# Patient Record
Sex: MALE | Race: WHITE | Employment: OTHER | URBAN - METROPOLITAN AREA
[De-identification: names, ages, dates, MRNs, and addresses within clinical notes are randomized per-mention and may not be internally consistent; named-entity substitution may affect disease eponyms.]

---

## 2020-07-22 VITALS
SYSTOLIC BLOOD PRESSURE: 126 MMHG | OXYGEN SATURATION: 98 % | BODY MASS INDEX: 31.82 KG/M2 | WEIGHT: 198 LBS | DIASTOLIC BLOOD PRESSURE: 70 MMHG | HEIGHT: 66 IN | HEART RATE: 51 BPM

## 2020-07-22 PROBLEM — H91.90 HEARING LOSS: Status: ACTIVE | Noted: 2020-07-22

## 2020-07-22 PROBLEM — H93.90 EAR PROBLEMS: Status: ACTIVE | Noted: 2020-07-22

## 2020-07-28 ENCOUNTER — OFFICE VISIT (OUTPATIENT)
Dept: ENT CLINIC | Age: 85
End: 2020-07-28
Payer: MEDICARE

## 2020-07-28 VITALS
BODY MASS INDEX: 35.88 KG/M2 | HEIGHT: 62 IN | DIASTOLIC BLOOD PRESSURE: 80 MMHG | SYSTOLIC BLOOD PRESSURE: 122 MMHG | HEART RATE: 74 BPM | TEMPERATURE: 97.9 F | RESPIRATION RATE: 18 BRPM | OXYGEN SATURATION: 97 % | WEIGHT: 195 LBS

## 2020-07-28 DIAGNOSIS — H60.8X1 CHRONIC ECZEMATOUS OTITIS EXTERNA OF RIGHT EAR: ICD-10-CM

## 2020-07-28 DIAGNOSIS — H90.3 SENSORINEURAL HEARING LOSS (SNHL) OF BOTH EARS: Primary | ICD-10-CM

## 2020-07-28 DIAGNOSIS — I69.993 CVA, OLD, ATAXIA: ICD-10-CM

## 2020-07-28 PROCEDURE — 99203 OFFICE O/P NEW LOW 30 MIN: CPT | Performed by: OTOLARYNGOLOGY

## 2020-07-28 RX ORDER — NEOMYCIN SULFATE, POLYMYXIN B SULFATE AND HYDROCORTISONE 10; 3.5; 1 MG/ML; MG/ML; [USP'U]/ML
3 SUSPENSION/ DROPS AURICULAR (OTIC) 2 TIMES DAILY
Qty: 10 ML | Refills: 1 | Status: SHIPPED | OUTPATIENT
Start: 2020-07-28 | End: 2020-08-07

## 2020-07-28 NOTE — PROGRESS NOTES
Subjective:    Scarlett Farias   80 y.o.   1935     Ear Pain  Patient complains of ear pain and possible ear infection. Symptoms include right ear drainage . Onset of symptoms was 1 minutes ago, gradually worsening since that time. He also c/o 1 minute right ear pressure/pain. He is drinking plenty of fluids. Follow-up   Pertinent negatives include no chest pain, no headaches and no shortness of breath. pt wears hearing aids, right ear bleeds sometimes, hearing aid tech states does not look right; no pain      Review of Systems  Review of Systems   Constitutional: Negative for chills and fever. HENT: Positive for ear discharge and hearing loss. Negative for ear pain, nosebleeds and tinnitus. Eyes: Negative for blurred vision and double vision. Respiratory: Negative for cough, sputum production and shortness of breath. Cardiovascular: Negative for chest pain and palpitations. Gastrointestinal: Negative for heartburn, nausea and vomiting. Musculoskeletal: Negative for joint pain and neck pain. Skin: Negative. Neurological: Positive for tremors and weakness. Negative for dizziness, speech change and headaches. Endo/Heme/Allergies: Negative for environmental allergies. Does not bruise/bleed easily. Psychiatric/Behavioral: Negative for memory loss and substance abuse. The patient does not have insomnia. All other systems reviewed and are negative. Objective:     Visit Vitals  /80 (BP 1 Location: Left arm, BP Patient Position: Sitting)   Pulse 74   Temp 97.9 °F (36.6 °C) (Oral)   Resp 18   Ht 5' 2\" (1.575 m)   Wt 195 lb (88.5 kg)   SpO2 97%   BMI 35.67 kg/m²        Physical Exam  Vitals signs reviewed. Constitutional:       Appearance: Normal appearance. He is normal weight. HENT:      Head: Normocephalic and atraumatic. Jaw: There is normal jaw occlusion. No trismus or malocclusion.       Salivary Glands: Right salivary gland is not diffusely enlarged or tender. Left salivary gland is not diffusely enlarged or tender. Right Ear: External ear normal. Decreased hearing noted. Drainage present. Tympanic membrane is scarred and erythematous. Left Ear: External ear normal. Decreased hearing noted. Tympanic membrane is scarred and erythematous. Ears:      Finley exam findings: does not lateralize. Right Rinne: AC > BC. Left Rinne: AC > BC. Comments: Hearing aids bilaterally  Dry skin AU - ? Fungal debris on left  Right is erythematous, scarred, hypervascular  Eyes:      Extraocular Movements: Extraocular movements intact. Pupils: Pupils are equal, round, and reactive to light. Neck:      Musculoskeletal: Normal range of motion. No edema or erythema. Thyroid: No thyroid mass, thyromegaly or thyroid tenderness. Trachea: Trachea and phonation normal. No tracheal tenderness. Cardiovascular:      Rate and Rhythm: Normal rate and regular rhythm. Pulmonary:      Effort: Pulmonary effort is normal.      Breath sounds: Normal breath sounds. No stridor. No wheezing. Musculoskeletal: Normal range of motion. Lymphadenopathy:      Cervical: No cervical adenopathy. Skin:     General: Skin is warm and dry. Neurological:      General: No focal deficit present. Mental Status: He is alert and oriented to person, place, and time. Mental status is at baseline. Motor: Weakness present. Psychiatric:         Mood and Affect: Mood normal.         Behavior: Behavior normal.         Assessment/Plan:     Encounter Diagnoses   Name Primary? 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 Follow-up and Dispositions    · Return in about 1 month (around 8/28/2020).

## 2020-07-28 NOTE — LETTER
7/28/20 Patient: Alpesh Vazquez YOB: 1935 Date of Visit: 7/28/2020 Payton Duncan MD 
[address] 
[address] [facility] [address],[id number] VIA Facsimile: [phone] Dear Payton Duncan MD, Thank you for referring Mr. Kortney Doty to [facility] NO[facility]ERGY [facility] for evaluation. My notes for this consultation are attached. If you have questions, please do not hesitate to call me. I look forward to following your patient along with you. Sincerely, Anayeli Tanner MD

## 2020-08-25 ENCOUNTER — OFFICE VISIT (OUTPATIENT)
Dept: ENT CLINIC | Age: 85
End: 2020-08-25
Payer: MEDICARE

## 2020-08-25 VITALS
TEMPERATURE: 97.1 F | SYSTOLIC BLOOD PRESSURE: 134 MMHG | HEART RATE: 74 BPM | DIASTOLIC BLOOD PRESSURE: 84 MMHG | RESPIRATION RATE: 18 BRPM | BODY MASS INDEX: 31.82 KG/M2 | WEIGHT: 198 LBS | OXYGEN SATURATION: 97 % | HEIGHT: 66 IN

## 2020-08-25 DIAGNOSIS — H90.6 MIXED HEARING LOSS, BILATERAL: Primary | ICD-10-CM

## 2020-08-25 DIAGNOSIS — H60.393 OTHER INFECTIVE CHRONIC OTITIS EXTERNA OF BOTH EARS: ICD-10-CM

## 2020-08-25 DIAGNOSIS — H60.399 ACUTE BACTERIAL OTITIS EXTERNA: ICD-10-CM

## 2020-08-25 DIAGNOSIS — H61.22 CERUMEN DEBRIS ON TYMPANIC MEMBRANE OF LEFT EAR: ICD-10-CM

## 2020-08-25 PROBLEM — H60.90 OTITIS EXTERNA: Status: ACTIVE | Noted: 2020-08-25

## 2020-08-25 PROCEDURE — G8427 DOCREV CUR MEDS BY ELIG CLIN: HCPCS | Performed by: OTOLARYNGOLOGY

## 2020-08-25 PROCEDURE — 1101F PT FALLS ASSESS-DOCD LE1/YR: CPT | Performed by: OTOLARYNGOLOGY

## 2020-08-25 PROCEDURE — 69210 REMOVE IMPACTED EAR WAX UNI: CPT | Performed by: OTOLARYNGOLOGY

## 2020-08-25 PROCEDURE — 99213 OFFICE O/P EST LOW 20 MIN: CPT | Performed by: OTOLARYNGOLOGY

## 2020-08-25 PROCEDURE — G8536 NO DOC ELDER MAL SCRN: HCPCS | Performed by: OTOLARYNGOLOGY

## 2020-08-25 PROCEDURE — G0444 DEPRESSION SCREEN ANNUAL: HCPCS | Performed by: OTOLARYNGOLOGY

## 2020-08-25 PROCEDURE — G8510 SCR DEP NEG, NO PLAN REQD: HCPCS | Performed by: OTOLARYNGOLOGY

## 2020-08-25 PROCEDURE — G8419 CALC BMI OUT NRM PARAM NOF/U: HCPCS | Performed by: OTOLARYNGOLOGY

## 2020-08-25 RX ORDER — NEOMYCIN SULFATE, POLYMYXIN B SULFATE AND HYDROCORTISONE 10; 3.5; 1 MG/ML; MG/ML; [USP'U]/ML
4 SUSPENSION/ DROPS AURICULAR (OTIC) 3 TIMES DAILY
Qty: 10 ML | Refills: 1 | Status: SHIPPED | OUTPATIENT
Start: 2020-08-25 | End: 2020-09-08

## 2020-08-25 NOTE — PROGRESS NOTES
Subjective:    [place]   80 y.o.   1935     Ear Pain  Patient complains of ear pain and possible ear infection. Symptoms include right ear drainage . Onset of symptoms was 1 minutes ago, gradually worsening since that time. He also c/o 1 minute right ear pressure/pain. He is drinking plenty of fluids. Follow-up   Pertinent negatives include no chest pain, no headaches and no shortness of breath. pt wears hearing aids, right ear bleeds sometimes, hearing aid tech states does not look right; no pain    8/25/20 - 1 mo f/u pt states ear does feel better, used the whole bottle right ear; hearing aid still not helping much      Review of Systems  Review of Systems   Constitutional: Negative for chills and fever. HENT: Positive for ear discharge and hearing loss. Negative for ear pain, nosebleeds and tinnitus. Eyes: Negative for blurred vision and double vision. Respiratory: Negative for cough, sputum production and shortness of breath. Cardiovascular: Negative for chest pain and palpitations. Gastrointestinal: Negative for heartburn, nausea and vomiting. Musculoskeletal: Negative for joint pain and neck pain. Skin: Negative. Neurological: Positive for tremors and weakness. Negative for dizziness, speech change and headaches. Endo/Heme/Allergies: Negative for environmental allergies. Does not bruise/bleed easily. Psychiatric/Behavioral: Negative for memory loss and substance abuse. The patient does not have insomnia. All other systems reviewed and are negative. Objective:     Visit Vitals  /84 (BP 1 Location: Left arm, BP Patient Position: Sitting)   Pulse 74   Temp 97.1 °F (36.2 °C) (Oral)   Resp 18   Ht 5' 6\" (1.676 m)   Wt 198 lb (89.8 kg)   SpO2 97%   BMI 31.96 kg/m²        Physical Exam  Vitals signs reviewed. Constitutional:       Appearance: Normal appearance. He is normal weight. HENT:      Head: Normocephalic and atraumatic. Jaw:  There is normal jaw occlusion. No trismus or malocclusion. Salivary Glands: Right salivary gland is not diffusely enlarged or tender. Left salivary gland is not diffusely enlarged or tender. Right Ear: External ear normal. Decreased hearing noted. Tympanic membrane is injected, scarred and erythematous. Left Ear: External ear normal. Decreased hearing noted. Drainage present. Tympanic membrane is injected, scarred and erythematous. Ears:      Finley exam findings: does not lateralize. Right Rinne: AC > BC. Left Rinne: AC > BC. Comments: Hearing aids bilaterally  Dry skin AU - cerumen and drainage cleaned left  Right is erythematous, scarred, hypervascular- this is slightly better      Procedure - Removal Impacted Cerumen    Ears are examined under microscope/headlight.  right ears are cleaned using otologic curette, suction, and/or alligator forceps. Eyes:      Extraocular Movements: Extraocular movements intact. Pupils: Pupils are equal, round, and reactive to light. Neck:      Musculoskeletal: Normal range of motion. No edema or erythema. Thyroid: No thyroid mass, thyromegaly or thyroid tenderness. Trachea: Trachea and phonation normal. No tracheal tenderness. Cardiovascular:      Rate and Rhythm: Normal rate and regular rhythm. Pulmonary:      Effort: Pulmonary effort is normal.      Breath sounds: Normal breath sounds. No stridor. No wheezing. Musculoskeletal: Normal range of motion. Lymphadenopathy:      Cervical: No cervical adenopathy. Skin:     General: Skin is warm and dry. Neurological:      General: No focal deficit present. Mental Status: He is alert and oriented to person, place, and time. Mental status is at baseline. Motor: Weakness present. Psychiatric:         Mood and Affect: Mood normal.         Behavior: Behavior normal.         Assessment/Plan:     Encounter Diagnoses   Name Primary?    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

## 2020-09-14 ENCOUNTER — HOSPITAL ENCOUNTER (OUTPATIENT)
Dept: PHYSICAL THERAPY | Age: 85
Discharge: HOME | End: 2020-09-14
Payer: MEDICARE

## 2020-09-14 PROCEDURE — 97110 THERAPEUTIC EXERCISES: CPT

## 2020-09-14 PROCEDURE — 97116 GAIT TRAINING THERAPY: CPT

## 2020-09-14 NOTE — PROGRESS NOTES
Please refer to the documentation in the prior EMR for therapy documentation prior to 09/14/2020 including evaluation findings, treatment notes, and therapy plan/goals. Please refer to the [facility] electronic portion of the medical record for therapy documentation entered on or after 09/14/2020.

## 2020-09-14 NOTE — PROGRESS NOTES
PT DAILY TREATMENT NOTE - [facility] 2-15    Patient Name: Castillo Saavedra  Date:2020  : 1935  [x]  Patient  Verified  Payor: Marcello Snare / Plan: [place] / Product Type: Medicare /    In time: 0200pm Out time: 0300pm  Total Treatment Time (min): 60  Total Timed Codes (min): 60  1:1 Treatment Time ( [address] only): 60  Visit #: 2  Treatment Area: Other abnormalities of gait and mobility [R26.89]    SUBJECTIVE  Pain Level (0-10 scale): 0/10    Any medication changes, allergies to medications, adverse drug reactions, diagnosis change, or new procedure performed?: [x] No    [] Yes (see summary sheet for update)    Subjective functional status/changes: Patient reports that he bought a cane 2-3 days ago and has been using it. Still feels weird. Not sure if height is right or not. Has bad vision as can't read small lines. Friend comes over every night to cook dinner and assist patient as needed. Had home health PT and did some LE strengthening, but hasn't been as consistent with it.     OBJECTIVE  [x] Skin assessment post-treatment:  [x]intact []redness- no adverse reaction    []redness  adverse reaction:     50 min Therapeutic Exercise:  [x] See flow sheet :   Rationale: increase strength, improve balance and increase proprioception to improve the patients ability to walk and perform ADLs with improved safety and independence        10 min Gait Trainin feet with SPC device on level surfaces with SBA level of assist   Rationale: improve balance and safety  to improve the patients ability to walk and perform ADLs with improved safety and independence            With   [x] TE   [] TA   [] neuro   [] other: Patient Education: [x] Review HEP    [] Progressed/Changed HEP based on:   [] positioning   [] body mechanics   [] transfers   [] heat/ice application    [] other:        Pain Level (0-10 scale) post treatment: 0/10    ASSESSMENT/Changes in Function:   Patient with good response to LE strength and balance interventions with some LE fatigue following session. Pt with requiring 1-1 supervision and guidance with interventions as suspect cognitive and visual deficits contributing to functional difficulty as well as LE strength/balance. PT arrives with [facility], but not using correctly,but at correct height as instruct in step through pattern with emphasis on big steps and heel to toe pattern. Most difficulty with fwd step ups without UE support as SBA-MinPA required at times. Written HEP provided and discussed with patient and friend with patient's permission. Patient will continue to benefit from skilled PT services to modify and progress therapeutic interventions, address strength deficits, analyze and cue movement patterns and instruct in home and community integration to attain remaining goals.       Goals  - pt to have no falling instances with the least restrictive AD within 2-4 weeks  -pt to be able to get under 13.5 seconds with TUG test  -pt to be able to perform at least 12 reps with the 30 second sit <> stand test  -pt to be able to walk community and household distances on uneven ground with the least restrictive device without significant unsteadiness  - pt to be able to walk community and household distances on even ground with the least restrictive device without significant unsteadiness  - pt to be independent with HEP prior to discharge  -pt to score at least 16 on DGI to reflect safety with dynamic mobility       []  See Plan of Care  []  See progress note/recertification  []  See Discharge Summary           PLAN  []  Upgrade activities as tolerated     [x]  Continue plan of care  []  Update interventions per flow sheet       []  Discharge due to:_  []  Other:_      Johnson Gray, PT, DPT 9/14/2020

## 2020-09-16 ENCOUNTER — HOSPITAL ENCOUNTER (OUTPATIENT)
Dept: PHYSICAL THERAPY | Age: 85
Discharge: HOME | End: 2020-09-16
Payer: MEDICARE

## 2020-09-16 PROCEDURE — 97110 THERAPEUTIC EXERCISES: CPT

## 2020-09-16 PROCEDURE — 97116 GAIT TRAINING THERAPY: CPT

## 2020-09-16 NOTE — PROGRESS NOTES
PT DAILY TREATMENT NOTE - [facility] 2-15     Patient Name: Carlos Collazo  NFYK:  : 1935  [x]? Patient  Verified  Payor: [place] MEDICARE / Plan: [place] MEDICARE PART A & B / Product Type: Medicare /    In time: 0200pm Out time: 0300pm  Total Treatment Time (min): 60  Total Timed Codes (min): 60  1:1 Treatment Time ([facility] only): 60  Visit #: 3    Treatment Area: Other abnormalities of gait and mobility [R26.89]     SUBJECTIVE  Pain Level (0-10 scale): 0/10     Any medication changes, allergies to medications, adverse drug reactions, diagnosis change, or new procedure performed?: [x]? No    []? Yes (see summary sheet for update)     Subjective functional status/changes: Patient reports had some LE soreness following last session. Friend reports doing HEP at home with no issues     OBJECTIVE       50 min Therapeutic Exercise:  [x]? See flow sheet :   Rationale: increase strength, improve balance and increase proprioception to improve the patients ability to walk and perform ADLs with improved safety and independence           10 min Gait Trainin feet with SPC device on level surfaces with SBA level of assist   Rationale: improve balance and safety  to improve the patients ability to walk and perform ADLs with improved safety and independence                                                                    With   [x]? TE   []? TA   []? neuro   []? other: Patient Education: [x]? Review HEP    []? Progressed/Changed HEP based on:   []? positioning   []? body mechanics   []? transfers   []? heat/ice application    []? other:          Pain Level (0-10 scale) post treatment: 0/10     ASSESSMENT/Changes in Function:   Patient with good response to LE strength and balance interventions with some LE fatigue following session with pt able to tolerate exercises with more resistance and difficulty today. Seated rest breaks required throughout sessions.  Pt with requiring 1-1 supervision and guidance with interventions as suspect cognitive and visual deficits contributing to functional difficulty as well as LE strength/balance. PT arrives with [facility], but not using correctly,but at correct height as instruct in step through pattern with emphasis on big steps and heel to toe pattern. Most difficulty with balance from foam with eyes closed today as tends to fall backwards. Patient will continue to benefit from skilled PT services to modify and progress therapeutic interventions, address strength deficits, analyze and cue movement patterns and instruct in home and community integration to attain remaining goals.        Goals  - pt to have no falling instances with the least restrictive AD within 2-4 weeks  -pt to be able to get under 13.5 seconds with TUG test  -pt to be able to perform at least 12 reps with the 30 second sit <> stand test  -pt to be able to walk community and household distances on uneven ground with the least restrictive device without significant unsteadiness  - pt to be able to walk community and household distances on even ground with the least restrictive device without significant unsteadiness  - pt to be independent with HEP prior to discharge  -pt to score at least 16 on DGI to reflect safety with dynamic mobility        []? See Plan of Care  []? See progress note/recertification  []? See Discharge Summary            PLAN  []? Upgrade activities as tolerated     [x]? Continue plan of care  []? Update interventions per flow sheet       []? Discharge due to:_  []?   Other:_       Hernandez Began, PT, DPT         9/14/2020

## 2020-09-21 ENCOUNTER — HOSPITAL ENCOUNTER (OUTPATIENT)
Dept: PHYSICAL THERAPY | Age: 85
Discharge: HOME | End: 2020-09-21
Payer: MEDICARE

## 2020-09-21 PROCEDURE — 97110 THERAPEUTIC EXERCISES: CPT

## 2020-09-21 NOTE — PROGRESS NOTES
PT DAILY TREATMENT NOTE - [facility] 2-15    Patient Name: Carlos Collazo  Date:2020  : 1935  [x]  Patient  Verified  Payor: Annalisa Del Toro / Plan: [place] MEDICARE PART A & B / Product Type: Medicare /    In time:02:00 PM  Out time: 02:48 PM  Total Treatment Time (min):48 Total Timed Codes (min): 48  1:1 Treatment Time ( [address] only): 48  Visit #:  3    Treatment Area: Other abnormalities of gait and mobility [R26.89]    SUBJECTIVE  Pain Level (0-10 scale): 0    Any medication changes, allergies to medications, adverse drug reactions, diagnosis change, or new procedure performed?: [x] No    [] Yes (see summary sheet for update)    Subjective functional status/changes:   [x] No changes reported      OBJECTIVE      45 min Therapeutic Exercise:  [x] See flow sheet :   Rationale: increase strength, improve coordination, improve balance and increase proprioception to improve the patients ability to ambulate safely with decreased falls. With   [] TE   [] TA   [] neuro   [] other: Patient Education: [x] Review HEP    [] Progressed/Changed HEP based on:   [] positioning   [] body mechanics   [] transfers   [] heat/ice application    [] other:      Pain Level (0-10 scale) post treatment: 0    ASSESSMENT/Changes in Function:  Patient is doing well with exercises with assistance. Ambulated with straight cane, but has a tendency to walk faster than the cane. Patient is hard of hearing. Patient will continue to benefit from skilled PT services to address functional mobility deficits, address strength deficits, analyze and cue movement patterns and address imbalance/dizziness to attain remaining goals. [x]  See Plan of Care  []  See progress note/recertification  []  See Discharge Summary         Progress towards goals / Updated goals: To be able to perform HEP independently.       PLAN  []  Upgrade activities as tolerated     [x]  Continue plan of care  []  Update interventions per flow sheet       [] Discharge due to:_  []  Other:_      Aisha Ventura, PT 9/21/2020

## 2020-09-22 ENCOUNTER — OFFICE VISIT (OUTPATIENT)
Dept: ENT CLINIC | Age: 85
End: 2020-09-22
Payer: MEDICARE

## 2020-09-22 VITALS
HEART RATE: 67 BPM | TEMPERATURE: 97.1 F | BODY MASS INDEX: 31.82 KG/M2 | DIASTOLIC BLOOD PRESSURE: 78 MMHG | OXYGEN SATURATION: 97 % | RESPIRATION RATE: 18 BRPM | HEIGHT: 66 IN | SYSTOLIC BLOOD PRESSURE: 140 MMHG | WEIGHT: 198 LBS

## 2020-09-22 DIAGNOSIS — H60.393 OTHER INFECTIVE CHRONIC OTITIS EXTERNA OF BOTH EARS: ICD-10-CM

## 2020-09-22 DIAGNOSIS — H90.6 MIXED HEARING LOSS, BILATERAL: Primary | ICD-10-CM

## 2020-09-22 DIAGNOSIS — H73.11 CHRONIC MYRINGITIS OF RIGHT EAR: ICD-10-CM

## 2020-09-22 PROCEDURE — 99213 OFFICE O/P EST LOW 20 MIN: CPT | Performed by: OTOLARYNGOLOGY

## 2020-09-22 NOTE — PROGRESS NOTES
Subjective:    Herlinda Leon   80 y.o.   1935     Ear Pain  Patient complains of ear pain and possible ear infection. Symptoms include right ear drainage . Onset of symptoms was 1 minutes ago, gradually worsening since that time. He also c/o 1 minute right ear pressure/pain. He is drinking plenty of fluids. Follow-up   Pertinent negatives include no chest pain, no headaches and no shortness of breath. pt wears hearing aids, right ear bleeds sometimes, hearing aid tech states does not look right; no pain    8/25/20 - 1 mo f/u pt states ear does feel better, used the whole bottle right ear; hearing aid still not helping much    9/22/20 - 1 mo f/u he has used neomycin for 2 weeks; does feel that drainage, and hearing is much better; no blood drainage noted      Review of Systems  Review of Systems   Constitutional: Negative for chills and fever. HENT: Positive for hearing loss. Negative for ear discharge, ear pain, nosebleeds and tinnitus. Eyes: Negative for blurred vision and double vision. Respiratory: Negative for cough, sputum production and shortness of breath. Cardiovascular: Negative for chest pain and palpitations. Gastrointestinal: Negative for heartburn, nausea and vomiting. Musculoskeletal: Negative for joint pain and neck pain. Skin: Negative. Neurological: Positive for tremors and weakness. Negative for dizziness, speech change and headaches. Endo/Heme/Allergies: Negative for environmental allergies. Does not bruise/bleed easily. Psychiatric/Behavioral: Negative for memory loss and substance abuse. The patient does not have insomnia. All other systems reviewed and are negative. Objective:     Visit Vitals  BP (!) 140/78 (BP 1 Location: Left arm, BP Patient Position: Sitting)   Pulse 67   Temp 97.1 °F (36.2 °C) (Oral)   Resp 18   Ht 5' 6\" (1.676 m)   Wt 198 lb (89.8 kg)   SpO2 97%   BMI 31.96 kg/m²        Physical Exam  Vitals signs reviewed. Constitutional:       Appearance: Normal appearance. He is normal weight. HENT:      Head: Normocephalic and atraumatic. Jaw: There is normal jaw occlusion. No trismus or malocclusion. Salivary Glands: Right salivary gland is not diffusely enlarged or tender. Left salivary gland is not diffusely enlarged or tender. Right Ear: External ear normal. Decreased hearing noted. Tympanic membrane is injected, scarred and erythematous. Left Ear: External ear normal. Decreased hearing noted. Drainage present. Tympanic membrane is injected, scarred and erythematous. Ears:      Finley exam findings: does not lateralize. Right Rinne: AC > BC. Left Rinne: AC > BC. Comments: Hearing aids bilaterally  Dry skin AU -   Resolved otorrhea left  Right TM is much less vascular approx 50% is clear now, no otorrhea        Eyes:      Extraocular Movements: Extraocular movements intact. Pupils: Pupils are equal, round, and reactive to light. Neck:      Musculoskeletal: Normal range of motion. No edema or erythema. Thyroid: No thyroid mass, thyromegaly or thyroid tenderness. Trachea: Trachea and phonation normal. No tracheal tenderness. Cardiovascular:      Rate and Rhythm: Normal rate and regular rhythm. Pulmonary:      Effort: Pulmonary effort is normal.      Breath sounds: Normal breath sounds. No stridor. No wheezing. Musculoskeletal: Normal range of motion. Lymphadenopathy:      Cervical: No cervical adenopathy. Skin:     General: Skin is warm and dry. Neurological:      General: No focal deficit present. Mental Status: He is alert and oriented to person, place, and time. Mental status is at baseline. Motor: Weakness present. Psychiatric:         Mood and Affect: Mood normal.         Behavior: Behavior normal.         Assessment/Plan:     Encounter Diagnoses   Name Primary?     Mixed hearing loss, bilateral Yes    Other infective chronic otitis externa of both ears     Chronic myringitis of right ear      Chronic/acute OE right - improved    SNHL - cont hearing aids     Audio reviewed is mixed HL    Use gtts again if an otorrhea o/w 6 mos    No orders of the defined types were placed in this encounter. Follow-up and Dispositions    · Return in about 6 months (around 3/22/2021).

## 2020-09-23 ENCOUNTER — HOSPITAL ENCOUNTER (OUTPATIENT)
Dept: PHYSICAL THERAPY | Age: 85
Discharge: HOME | End: 2020-09-23
Payer: MEDICARE

## 2020-09-23 PROCEDURE — 97110 THERAPEUTIC EXERCISES: CPT

## 2020-09-23 PROCEDURE — 97116 GAIT TRAINING THERAPY: CPT

## 2020-09-23 NOTE — PROGRESS NOTES
PT DAILY TREATMENT NOTE - [facility] 2-15    Patient Name: Estephania Lim  Date:2020  : 1935  [x]  Patient  mervin  Payor: Ramiro Selena / Plan: [place] / Product Type: Medicare /    In time:0204pm  Out time:258pm  Total Treatment Time (min): 54  Total Timed Codes (min): 54   1:1 Treatment Time ( [address] only): 54   Visit #: 5    Treatment Area: Other abnormalities of gait and mobility [R26.89]    SUBJECTIVE  Pain Level (0-10 scale): 0/10  Any medication changes, allergies to medications, adverse drug reactions, diagnosis change, or new procedure performed?: [x] No    [] Yes (see summary sheet for update)  Subjective functional status/changes:   [] No changes reported  Pt reports doing ok not really using cane at home and getting around ok as far as he is concerned. OBJECTIVE      44 min Therapeutic Exercise:  [x] See flow sheet :   Rationale: increase ROM, increase strength, improve coordination and improve balance to improve the patients ability to improve strength and safety with gait. 10 min Gait Training:  _200+__ feet with __no _ device on level surfaces with supervision_ level of assist   Rationale: increase ROM, increase strength, improve coordination and improve balance  to improve the patients ability to consistency with walking decreased use of any AD          With   [x] TE   [] TA   [] neuro   [] other: Patient Education: [x] Review HEP    [] Progressed/Changed HEP based on:   [] positioning   [] body mechanics   [] transfers   [] heat/ice application    [] other:      Other Objective/Functional Measures: 6 min walk /6 laps    Pain Level (0-10 scale) post treatment: 0/10    ASSESSMENT/Changes in Function:   Pt tolerated session with functional activities and only occasional cues for correction and safety. Pt did not need any additional rest between tasks.    Patient will continue to benefit from skilled PT services to modify and progress therapeutic interventions, address functional mobility deficits, address ROM deficits and address strength deficits to attain remaining goals. []  See Plan of Care  []  See progress note/recertification  []  See Discharge Summary         Progress towards goals / Updated goals:  - pt to have no falling instances with the least restrictive AD within 2-4 weeks  -pt to be able to get under 13.5 seconds with TUG test  -pt to be able to perform at least 12 reps with the 30 second sit <> stand test  -pt to be able to walk community and household distances on uneven ground with the least restrictive device without significant unsteadiness  - pt to be able to walk community and household distances on even ground with the least restrictive device without significant unsteadiness  - pt to be independent with HEP prior to discharge  -pt to score at least 16 on DGI to reflect safety with dynamic mobility    PLAN  []  Upgrade activities as tolerated     [x]  Continue plan of care  []  Update interventions per flow sheet       []  Discharge due to:_  []  Other:_      Tanja Magallanes 9/23/2020

## 2020-09-29 ENCOUNTER — HOSPITAL ENCOUNTER (OUTPATIENT)
Dept: PHYSICAL THERAPY | Age: 85
Discharge: HOME | End: 2020-09-29
Payer: MEDICARE

## 2020-09-29 PROCEDURE — 97116 GAIT TRAINING THERAPY: CPT

## 2020-09-29 PROCEDURE — 97110 THERAPEUTIC EXERCISES: CPT

## 2020-09-29 NOTE — PROGRESS NOTES
PT DAILY TREATMENT NOTE - [facility] 2-15    Patient Name: Lisseth Tam  Date:2020  : 1935  [x]  Patient  Verified  Payor: Robert Odom / Plan: [place] MED[place]ARE PART A & B / Product Type: Medicare /    In time:0158 pm  Out time:258pm  Total Treatment Time (min): 60  Total Timed Codes (min): 60  1:1 Treatment Time ( [address] only): 60  Visit #: 6  Treatment Area: Other abnormalities of gait and mobility [R26.89]    SUBJECTIVE  Pain Level (0-10 scale): 0/10  Any medication changes, allergies to medications, adverse drug reactions, diagnosis change, or new procedure performed?: [x] No    [] Yes (see summary sheet for update)  Subjective functional status/changes:   [] No changes reported  Pt reports he did not bring his cane because he keeps leaving it, forgot he even needed it. OBJECTIVE      50 min Therapeutic Exercise:  [x] See flow sheet :   Rationale: increase ROM, increase strength, improve coordination and improve balance to improve the patients ability to general strength and balance. 10 min Gait Training:  _6 laps 30__ feet with _no assitive__ device on level surfaces with __supervision_ level of assist   Rationale: improve coordination, improve balance and increase proprioception  to improve the patients ability to reduce fall potential           With   [] TE   [] TA   [] neuro   [] other: Patient Education: [x] Review HEP    [] Progressed/Changed HEP based on:   [] positioning   [] body mechanics   [] transfers   [] heat/ice application    [] other:        Pain Level (0-10 scale) post treatment: 0/10    ASSESSMENT/Changes in Function:   Pt working hard on all ex and balance tasks. Patient will continue to benefit from skilled PT services to modify and progress therapeutic interventions, address functional mobility deficits, address ROM deficits, address strength deficits, analyze and modify body mechanics/ergonomics and instruct in home and community integration to attain remaining goals.      [] See Plan of Care  []  See progress note/recertification  []  See Discharge Summary         Progress towards goals / Updated goals:  - pt to have no falling instances with the least restrictive AD within 2-4 weeks  -pt to be able to get under 13.5 seconds with TUG test  -pt to be able to perform at least 12 reps with the 30 second sit <> stand test  -pt to be able to walk community and household distances on uneven ground with the least restrictive device without significant unsteadiness  - pt to be able to walk community and household distances on even ground with the least restrictive device without significant unsteadiness  - pt to be independent with HEP prior to discharge  -pt to score at least 16 on DGI to reflect safety with dynamic mobility    PLAN  []  Upgrade activities as tolerated     [x]  Continue plan of care  []  Update interventions per flow sheet       []  Discharge due to:_  []  Other:_      Jai Farris 9/29/2020

## 2020-10-01 ENCOUNTER — HOSPITAL ENCOUNTER (OUTPATIENT)
Dept: PHYSICAL THERAPY | Age: 85
Discharge: HOME | End: 2020-10-01
Payer: MEDICARE

## 2020-10-01 PROCEDURE — 97116 GAIT TRAINING THERAPY: CPT

## 2020-10-01 PROCEDURE — 97110 THERAPEUTIC EXERCISES: CPT

## 2020-10-01 NOTE — PROGRESS NOTES
PT DAILY TREATMENT NOTE - [facility] 2-15    Patient Name: Ramesh Morales  Date:10/1/2020  : 1935  [x]  Patient  Verified  Payor: Eufemia Cabrera / Plan: [place] MEDICARE PART A & B / Product Type: Medicare /    In time:0202 pm  Out time:0300pm  Total Treatment Time (min): 58  Total Timed Codes (min): 58  1:1 Treatment Time ( [address] only): 58  Visit #: 7  Treatment Area: Other abnormalities of gait and mobility [R26.89]    SUBJECTIVE  Pain Level (0-10 scale): 0/10  Any medication changes, allergies to medications, adverse drug reactions, diagnosis change, or new procedure performed?: [x] No    [] Yes (see summary sheet for update)  Subjective functional status/changes:   [] No changes reported  Pt reports using the cane less and less at home and when going out care giver brings it because pt forgets to grab it even when left at the door. OBJECTIVE      48 min Therapeutic Exercise:  [x] See flow sheet :   Rationale: increase ROM, increase strength, improve coordination and improve balance to improve the patients ability to improve balance and gait     10 min Gait Training:  _782__ feet with _no __ device on level surfaces with ___ level of assist also worked on change of direction and side steps   Rationale: increase ROM, improve coordination and improve balance  to improve the patients ability to improve consistency and reduce fall potential.          With   [] TE   [] TA   [] neuro   [] other: Patient Education: [x] Review HEP    [] Progressed/Changed HEP based on:   [] positioning   [] body mechanics   [] transfers   [] heat/ice application    [] other:           Pain Level (0-10 scale) post treatment: 0/10    ASSESSMENT/Changes in Function:   Pt tolerates all ex with good effort on all task. Note pt does continue to have occasional unsteadiness and demonstrates slight R side neglect.    Patient will continue to benefit from skilled PT services to modify and progress therapeutic interventions, address functional mobility deficits, address ROM deficits, address strength deficits and address imbalance/dizziness to attain remaining goals.      []  See Plan of Care  []  See progress note/recertification  []  See Discharge Summary         Progress towards goals / Updated goals:  -pt to have no falling instances with the least restrictive AD within 2-4 weeks  -pt to be able to get under 13.5 seconds with TUG test  -pt to be able to perform at least 12 reps with the 30 second sit <> stand test  -pt to be able to walk community and household distances on uneven ground with the least restrictive device without significant unsteadiness  - pt to be able to walk community and household distances on even ground with the least restrictive device without significant unsteadiness  - pt to be independent with HEP prior to discharge  -pt to score at least 16 on DGI to reflect safety with dynamic mobility    PLAN  []  Upgrade activities as tolerated     [x]  Continue plan of care  []  Update interventions per flow sheet       []  Discharge due to:_  []  Other:_      DANIEL Mcqueen 10/1/2020

## 2020-10-05 ENCOUNTER — HOSPITAL ENCOUNTER (OUTPATIENT)
Dept: PHYSICAL THERAPY | Age: 85
Discharge: HOME | End: 2020-10-05
Payer: MEDICARE

## 2020-10-05 PROCEDURE — 97116 GAIT TRAINING THERAPY: CPT

## 2020-10-05 PROCEDURE — 97110 THERAPEUTIC EXERCISES: CPT

## 2020-10-05 NOTE — PROGRESS NOTES
PT DAILY TREATMENT NOTE - [facility] 2-15    Patient Name: Damien Finley  Date:10/5/2020  : 1935  [x]  Patient  Verified  Payor: Nevaeh Lainez / Plan: [place] MEDICARE PART A & B / Product Type: Medicare /    In time:0150 pm  Out time:252 pm  Total Treatment Time (min): 61  Total Timed Codes (min) 61  1:1 Treatment Time ( [address] only): 61   Visit #: 8  Treatment Area: Other abnormalities of gait and mobility [R26.89]    SUBJECTIVE  Pain Level (0-10 scale): 0/10  Any medication changes, allergies to medications, adverse drug reactions, diagnosis change, or new procedure performed?: [x] No    [] Yes (see summary sheet for update)  Subjective functional status/changes:   [] No changes reported  Pt has no c/o upon arrival. Note post fall pt reports that he did have some blood in his R ear last night and is not to wear his hearing aid until he is seen by the ENT. OBJECTIVE    44 min Therapeutic Exercise:  [x] See flow sheet :   Rationale: increase ROM, increase strength and improve balance to improve the patients ability to increase functional activities and reduce fall potential       12 min Gait Trainin_ feet with __no device on level surfaces with distant supervision_ level of assist   Rationale: improve coordination, improve balance and step consistency  to improve the patients ability to improve gait without use of AD. With   [] TE   [] TA   [] neuro   [] other: Patient Education: [x] Review HEP    [] Progressed/Changed HEP based on:   [] positioning   [] body mechanics   [] transfers   [] heat/ice application    [] other:          Pain Level (0-10 scale) post treatment: 0/10    ASSESSMENT/Changes in Function:   Note pt did well with all ex, and balance tasks prior to gait (6 min walk without AD). Pt was observed for the entire gait trial and had no issues with dragging his feet or catching toes. No visible unsteadiness or LOB. Pt di however fall just prior to getting to chair post timed walk.  Pt had issue with L knee giving way with increased step stride and forward posture and unable to self correct, going down on L knee and needing assistance to get from knee back up on feet. BP was checked post fall 164/85 and after seated rest 146/85. Pt checked by cardiac nurse and no injury or ill effect other than pt noting that he was trying to be funny. Patient will continue to benefit from skilled PT services to modify and progress therapeutic interventions, address functional mobility deficits, address ROM deficits, address strength deficits and analyze and modify body mechanics/ergonomics to attain remaining goals.      []  See Plan of Care  []  See progress note/recertification  []  See Discharge Summary         Progress towards goals / Updated goals:  pt to have no falling instances with the least restrictive AD within 2-4 weeks  -pt to be able to get under 13.5 seconds with TUG test  -pt to be able to perform at least 12 reps with the 30 second sit <> stand test  -pt to be able to walk community and household distances on uneven ground with the least restrictive device without significant unsteadiness  - pt to be able to walk community and household distances on even ground with the least restrictive device without significant unsteadiness  - pt to be independent with HEP prior to discharge  -pt to score at least 16 on DGI to reflect safety with dynamic mobility    PLAN  [x]  Upgrade activities as tolerated     [x]  Continue plan of care  []  Update interventions per flow sheet       []  Discharge due to:_  []  Other:_      DANIEL Roberson 10/5/2020

## 2020-10-07 ENCOUNTER — HOSPITAL ENCOUNTER (OUTPATIENT)
Dept: PHYSICAL THERAPY | Age: 85
Discharge: HOME | End: 2020-10-07
Payer: MEDICARE

## 2020-10-07 PROCEDURE — 97110 THERAPEUTIC EXERCISES: CPT

## 2020-10-07 NOTE — PROGRESS NOTES
PT DAILY TREATMENT NOTE - [facility] 2-15    Patient Name: Yesi Jean  Date:10/7/2020  : 1935  [x]  Patient  Verified  Payor: [place] MEDICARE / Plan: [place] MEDICARE PART A & B / Product Type: Medicare /    In time:1306  Out time:1405  Total Treatment Time (min): 59  Total Timed Codes (min): 59  1:1 Treatment Time ([facility] only): 59   Visit #: 9    Treatment Area: Other abnormalities of gait and mobility [R26.89]    SUBJECTIVE  Pain Level (0-10 scale): 0/10  Any medication changes, allergies to medications, adverse drug reactions, diagnosis change, or new procedure performed?: [x] No    [] Yes (see summary sheet for update)  Subjective functional status/changes:   [] No changes reported    Pt reports that his balance is improving overall and he is feeling stronger    OBJECTIVE    59 min Therapeutic Exercise:  [x] See flow sheet :   Rationale: increase ROM, increase strength and improve balance to improve the patients ability to perform functional mobility          With   [x] TE   [] TA   [] neuro   [] other: Patient Education: [x] Review HEP    [] Progressed/Changed HEP based on:   [] positioning   [] body mechanics   [] transfers   [] heat/ice application    [] other:      Other Objective/Functional Measures: DGI 15/24 See chart       TU.9 seconds (no AD)     Pain Level (0-10 scale) post treatment: 0/10    ASSESSMENT/Changes in Function:   Pt has progressed well since beginning therapy. His balance had improved, however, he did sustain a fall this week during therapy while performing a walk test.  He continues to demonstrate impaired gait and balance and will likely benefit from continued skilled services to address these issues 2x/week for an additional 8 visits to accomplish his goals.   Patient will continue to benefit from skilled PT services to modify and progress therapeutic interventions, address functional mobility deficits, address strength deficits and address imbalance/dizziness to attain remaining goals.     []  See Plan of Care  [x]  See progress note/recertification  []  See Discharge Summary         Progress towards goals / Updated goals:  1)pt to have no falling instances with the least restrictive AD within 2-4 weeks Unmet, patient experienced a fall earlier this week  2)pt to be able to get under 13.5 seconds with TUG test Partially Met, pt performed TUG in 14.9 seconds  3)pt to be able to perform at least 12 reps with the 30 second sit <> stand test Met  4)pt to be able to walk community and household distances on uneven ground with the least restrictive device without significant unsteadiness Partially Met, pt still notes some unsteadiness on uneven ground  5)pt to be able to walk community and household distances on even ground with the least restrictive device without significant unsteadiness Met  6) pt to be independent with HEP prior to discharge Partially Met  7)pt to score at least 16 on DGI to reflect safety with dynamic mobility Partially Met, pt scored 15/24 today    PLAN  [x]  Upgrade activities as tolerated     [x]  Continue plan of care  []  Update interventions per flow sheet       []  Discharge due to:_  []  Other:_      Vladimir Londono PT,DPT 10/7/2020

## 2020-10-07 NOTE — PROGRESS NOTES
[address]  [place], [place]  Ph: [phone]    Fax: [phone]    Progress Note    Name: Deborah Da Silva   : 1935   MD: Shoshana Baltazar MD       Treatment Diagnosis: Other abnormalities of gait and mobility [R26.89]  Start of Care: 9/10/2020    Visits from Start of Care: 9  Missed Visits: 0    Summary of Care: pt has received therapeutic exercises, neuro re-ed, and therapeutic activities to improve impaired gait, balance, and strength    Assessment / Recommendations:     Progress towards goals / Updated goals:  1)pt to have no falling instances with the least restrictive AD within 2-4 weeks Unmet, patient experienced a fall earlier this week  2)pt to be able to get under 13.5 seconds with TUG test Partially Met, pt performed TUG in 14.9 seconds  3)pt to be able to perform at least 12 reps with the 30 second sit <> stand test Met  4)pt to be able to walk community and household distances on uneven ground with the least restrictive device without significant unsteadiness Partially Met, pt still notes some unsteadiness on uneven ground  5)pt to be able to walk community and household distances on even ground with the least restrictive device without significant unsteadiness Met  6) pt to be independent with HEP prior to discharge Partially Met  7)pt to score at least 16 on DGI to reflect safety with dynamic mobility Partially Met, pt scored 15/24 today    Other:   Pt has progressed well since beginning therapy. His balance had improved, however, he did sustain a fall this week during therapy while performing a walk test.  He continues to demonstrate impaired gait and balance and will likely benefit from continued skilled services to address these issues 2x/week for an additional 8 visits to accomplish his goals.   Patient will continue to benefit from skilled PT services to modify and progress therapeutic interventions, address functional mobility deficits, address strength deficits and address imbalance/dizziness to attain remaining goals. Zuhair Jenkins PT, DPT 10/7/2020     ________________________________________________________________________  NOTE TO PHYSICIAN:  Please complete the following and fax to:  [facility]:   Fax: [phone]  . Retain this original for your records. If you are unable to process this request in 24 hours, please contact our office.        ____ I have read the above report and request that my patient continue therapy with the following changes/special instructions:  ____ I have read the above report and request that my patient be discharged from therapy    Physician's Signature:_________________ Date:___________Time:__________

## 2020-10-12 ENCOUNTER — APPOINTMENT (OUTPATIENT)
Dept: PHYSICAL THERAPY | Age: 85
End: 2020-10-12
Payer: MEDICARE

## 2020-10-13 ENCOUNTER — HOSPITAL ENCOUNTER (OUTPATIENT)
Dept: PHYSICAL THERAPY | Age: 85
Discharge: HOME | End: 2020-10-13
Payer: MEDICARE

## 2020-10-13 PROCEDURE — 97110 THERAPEUTIC EXERCISES: CPT

## 2020-10-13 NOTE — PROGRESS NOTES
PT DAILY TREATMENT NOTE - [facility] 2-15    Patient Name: Damien Finley  Date:10/13/2020  : 1935  [x]  Patient  Verified  Payor: Nevaeh Lainez / Plan: [place] MEDICARE PART A & B / Product Type: Medicare /    In time:300pm  Out time:356pm  Total Treatment Time (min): 56  Total Timed Codes (min): 56  1:1 Treatment Time [facility] only):56   Visit #: 10  Treatment Area: Other abnormalities of gait and mobility [R26.89]    SUBJECTIVE  Pain Level (0-10 scale): 0/10  Any medication changes, allergies to medications, adverse drug reactions, diagnosis change, or new procedure performed?: [x] No    [] Yes (see summary sheet for update)  Subjective functional status/changes:   [] No changes reported  Pt reports no issues and feeling pretty good. OBJECTIVE    56 min Therapeutic Exercise:  [x] See flow sheet :   Rationale: increase ROM, increase strength, improve coordination and improve balance to improve the patients ability to improve safety and gait             With   [x] TE   [] TA   [] neuro   [] other: Patient Education: [x] Review HEP    [] Progressed/Changed HEP based on:   [] positioning   [] body mechanics   [] transfers   [] heat/ice application    [] other:          Pain Level (0-10 scale) post treatment: 0/10    ASSESSMENT/Changes in Function:   Pt did well with all ex no issues note did review safety of transfers with balance deficits. Patient will continue to benefit from skilled PT services to modify and progress therapeutic interventions, address functional mobility deficits, address ROM deficits and address strength deficits to attain remaining goals.      [x]  See Plan of Care  []  See progress note/recertification  []  See Discharge Summary         Progress towards goals / Updated goals:  1)pt to have no falling instances with the least restrictive AD within 2-4 weeks Unmet, patient experienced a fall earlier this week  2)pt to be able to get under 13.5 seconds with TUG test Partially Met, pt performed TUG in 14.9 seconds  3)pt to be able to perform at least 12 reps with the 30 second sit <> stand test Met  4)pt to be able to walk community and household distances on uneven ground with the least restrictive device without significant unsteadiness Partially Met, pt still notes some unsteadiness on uneven ground  5)pt to be able to walk community and household distances on even ground with the least restrictive device without significant unsteadiness Met  6) pt to be independent with HEP prior to discharge Partially Met  7)pt to score at least 16 on DGI to reflect safety with dynamic mobility Partially Met, pt scored 15/24 today    PLAN  [x]  Upgrade activities as tolerated     [x]  Continue plan of care  []  Update interventions per flow sheet       []  Discharge due to:_  []  Other:_      Tiara Woods, PTA 10/13/2020

## 2020-10-16 ENCOUNTER — HOSPITAL ENCOUNTER (OUTPATIENT)
Dept: PHYSICAL THERAPY | Age: 85
Discharge: HOME | End: 2020-10-16
Payer: MEDICARE

## 2020-10-16 PROCEDURE — 97116 GAIT TRAINING THERAPY: CPT

## 2020-10-16 PROCEDURE — 97110 THERAPEUTIC EXERCISES: CPT

## 2020-10-16 NOTE — PROGRESS NOTES
PT DAILY TREATMENT NOTE - [facility] 2-15    Patient Name: Ashwini Alvarez  Date:10/16/2020  : 1935  [x]  Patient  Verified  Payor: Ruth Ann Pandya / Plan: [place] / Product Type: Medicare /    In time: 153p  Out time: 247p  Total Treatment Time (min): 64  Total Timed Codes (min): 64  1:1 Treatment Time ([address] only): 64  Visit #:  11    Treatment Area: Other abnormalities of gait and mobility [R26.89]    SUBJECTIVE  Pain Level (0-10 scale): 0/10  Any medication changes, allergies to medications, adverse drug reactions, diagnosis change, or new procedure performed?: [x] No    [] Yes (see summary sheet for update)  Subjective functional status/changes:   [] No changes reported  Pt states he feels good today. OBJECTIVE    54 min Therapeutic Exercise:  [x] See flow sheet :   Rationale: increase ROM, increase strength, improve coordination and improve balance to improve the patients ability to increase functional mobility    10 min Gait Trainin feet with NO device on level surfaces with supervision level of assist   Rationale: increase ROM, increase strength, improve coordination and improve balance  to improve the patients ability to return to PLOF          With   [x] TE   [] TA   [] neuro   [] other: Patient Education: [x] Review HEP    [] Progressed/Changed HEP based on:   [] positioning   [] body mechanics   [] transfers   [] heat/ice application    [] other:      Other Objective/Functional Measures: pt requires max visual demonstration and verbal cues to complete exercises with correct technique. Pain Level (0-10 scale) post treatment: 0/10    ASSESSMENT/Changes in Function:   Pt tolerated therapy well today. Pt was able to walk 6 laps during the 6 min walk test with no unsteadiness or LOB. Pt has hard time hearing so therapist used a lot of visual demonstration for exercises to make sure pt understood what to do.   Patient will continue to benefit from skilled PT services to modify and progress therapeutic interventions, address functional mobility deficits, address ROM deficits, address strength deficits, analyze and address soft tissue restrictions, analyze and cue movement patterns and analyze and modify body mechanics/ergonomics to attain remaining goals.      [x]  See Plan of Care  []  See progress note/recertification  []  See Discharge Summary         Progress towards goals / Updated goals:  1)pt to have no falling instances with the least restrictive AD within 2-4 weeks Unmet, patient experienced a fall earlier this week  2)pt to be able to get under 13.5 seconds with TUG test Partially Met, pt performed TUG in 14.9 seconds  3)pt to be able to perform at least 12 reps with the 30 second sit <> stand test Met  4)pt to be able to walk community and household distances on uneven ground with the least restrictive device without significant unsteadiness Partially Met, pt still notes some unsteadiness on uneven ground  5)pt to be able to walk community and household distances on even ground with the least restrictive device without significant unsteadiness Met  6) pt to be independent with HEP prior to discharge Partially Met  7)pt to score at least 16 on DGI to reflect safety with dynamic mobility Partially Met, pt scored 15/24 today    PLAN  []  Upgrade activities as tolerated     [x]  Continue plan of care  []  Update interventions per flow sheet       []  Discharge due to:_  []  Other:_      DANIEL Conte  10/16/2020

## 2020-10-19 ENCOUNTER — HOSPITAL ENCOUNTER (OUTPATIENT)
Dept: PHYSICAL THERAPY | Age: 85
Discharge: HOME | End: 2020-10-19
Payer: MEDICARE

## 2020-10-19 PROCEDURE — 97110 THERAPEUTIC EXERCISES: CPT

## 2020-10-19 PROCEDURE — 97116 GAIT TRAINING THERAPY: CPT

## 2020-10-19 NOTE — PROGRESS NOTES
PT DAILY TREATMENT NOTE - [facility] 2-15    Patient Name: Heber Cardenas  Date:10/19/2020  : 1935  [x]  Patient  Verified  Payor: Iman Coffee / Plan: [place] MEDICARE PART A & B / Product Type: Medicare /    In time:155pm  Out time:255 pm  Total Treatment Time (min): 60  Total Timed Codes (min): 60  1:1 Treatment Time ([address] only): *60   Visit #:  12    Treatment Area: Other abnormalities of gait and mobility [R26.89]    SUBJECTIVE  Pain Level (0-10 scale): 0/10  Any medication changes, allergies to medications, adverse drug reactions, diagnosis change, or new procedure performed?: [x] No    [] Yes (see summary sheet for update)  Subjective functional status/changes:   [] No changes reported  Pt has no c/o and no reports of LOB or unsteadiness. OBJECTIVE      50 min Therapeutic Exercise:  [x] See flow sheet :   Rationale: increase ROM, increase strength and improve coordination to improve the patients ability to increased balance and activity strength. 10 min Gait Training:  _720+__ feet with _no __ device on level surfaces with _supervision __ level of assist   Rationale: increase strength, improve coordination and improve balance  to improve the patients ability to consistency with gait and reduce fall potential           With   [] TE   [] TA   [] neuro   [] other: Patient Education: [x] Review HEP    [x] Progressed/Changed HEP based on:   [] positioning   [] body mechanics   [] transfers   [] heat/ice application    [] other:      Other Objective/Functional Measures: TUG 16 sec average    Pain Level (0-10 scale) post treatment: *0/10    ASSESSMENT/Changes in Function:   Pt tolerates all ex supervision and cues needed throughtout session to correct posture, technique and cue for safety. Pt has good tolerance however show some inconsistency as he fatigues with sustained ex.    Patient will continue to benefit from skilled PT services to modify and progress therapeutic interventions, address functional mobility deficits, address ROM deficits, address strength deficits and analyze and cue movement patterns to attain remaining goals.      []  See Plan of Care  []  See progress note/recertification  []  See Discharge Summary         Progress towards goals / Updated goals:  1)pt to have no falling instances with the least restrictive AD within 2-4 weeks Unmet, patient experienced a fall earlier this week  2)pt to be able to get under 13.5 seconds with TUG test Partially Met, pt performed TUG in 14.9 seconds  3)pt to be able to perform at least 12 reps with the 30 second sit <> stand test Met  4)pt to be able to walk community and household distances on uneven ground with the least restrictive device without significant unsteadiness Partially Met, pt still notes some unsteadiness on uneven ground  5)pt to be able to walk community and household distances on even ground with the least restrictive device without significant unsteadiness Met  6) pt to be independent with HEP prior to discharge Partially Met  7)pt to score at least 16 on DGI to reflect safety with dynamic mobility Partially Met, pt scored 15/24 today       PLAN  [x]  Upgrade activities as tolerated     [x]  Continue plan of care  []  Update interventions per flow sheet       []  Discharge due to:_  []  Other:_      Kym Salinas, PTA 10/19/2020

## 2020-10-21 ENCOUNTER — HOSPITAL ENCOUNTER (OUTPATIENT)
Dept: PHYSICAL THERAPY | Age: 85
Discharge: HOME | End: 2020-10-21
Payer: MEDICARE

## 2020-10-21 PROCEDURE — 97110 THERAPEUTIC EXERCISES: CPT

## 2020-10-21 PROCEDURE — 97116 GAIT TRAINING THERAPY: CPT

## 2020-10-21 NOTE — PROGRESS NOTES
PT DAILY TREATMENT NOTE - [facility] 2-15    Patient Name: Gogo Salmeron  Date:10/21/2020  : 1935  [x]  Patient  Verified  Payor: Micaela Failing / Plan: [place] MEDICARE PART A & B / Product Type: Medicare /    In time:146pm  Out [id number] pm  Total Treatment Time (min): *59  Total Timed Codes (min): 59  1:1 Treatment Time ( [address] only): **59  Visit #: 13  Treatment Area: Other abnormalities of gait and mobility [R26.89]    SUBJECTIVE  Pain Level (0-10 scale): *0/10  Any medication changes, allergies to medications, adverse drug reactions, diagnosis change, or new procedure performed?: [x] No    [] Yes (see summary sheet for update)  Subjective functional status/changes:   [] No changes reported  Pt reports that he does not feel that his left leg is getting any stronger. But it only bothers him when he gets up and down too much. OBJECTIVE      49 min Therapeutic Exercise:  [x] See flow sheet :   Rationale: increase ROM, improve coordination and improve balance to improve the patients ability to increase LE strength       10* min Gait Training:  _720__ feet with _no __ device on level surfaces with __supervision_ level of assist   Rationale: increase ROM, increase strength and improve coordination  to improve the patients ability to increase functional balance and gait. With   [x] TE   [] TA   [] neuro   [] other: Patient Education: [x] Review HEP    [] Progressed/Changed HEP based on:   [] positioning   [] body mechanics   [] transfers   [] heat/ice application    [] other:          Pain Level (0-10 scale) post treatment: 0/10    ASSESSMENT/Changes in Function:   Pt tolerates all ex noting needing occasional cues to reduce pace and increase safety because of instability and L knee.    Patient will continue to benefit from skilled PT services to modify and progress therapeutic interventions, address functional mobility deficits, address ROM deficits, address strength deficits and analyze and modify body mechanics/ergonomics to attain remaining goals.      [x]  See Plan of Care  []  See progress note/recertification  []  See Discharge Summary         Progress towards goals / Updated goals:  1)pt to have no falling instances with the least restrictive AD within 2-4 weeks Unmet, patient experienced a fall earlier this week  2)pt to be able to get under 13.5 seconds with TUG test Partially Met, pt performed TUG in 14.9 seconds  3)pt to be able to perform at least 12 reps with the 30 second sit <> stand test Met  4)pt to be able to walk community and household distances on uneven ground with the least restrictive device without significant unsteadiness Partially Met, pt still notes some unsteadiness on uneven ground  5)pt to be able to walk community and household distances on even ground with the least restrictive device without significant unsteadiness Met  6) pt to be independent with HEP prior to discharge Partially Met  7)pt to score at least 16 on DGI to reflect safety with dynamic mobility Partially Met, pt scored 15/24 today    PLAN  [x]  Upgrade activities as tolerated     [x]  Continue plan of care  []  Update interventions per flow sheet       []  Discharge due to:_  []  Other:_      Marylene Matte, PTA 10/21/2020

## 2020-10-26 ENCOUNTER — HOSPITAL ENCOUNTER (OUTPATIENT)
Dept: PHYSICAL THERAPY | Age: 85
Discharge: HOME | End: 2020-10-26
Payer: MEDICARE

## 2020-10-26 PROCEDURE — 97110 THERAPEUTIC EXERCISES: CPT

## 2020-10-26 PROCEDURE — 97116 GAIT TRAINING THERAPY: CPT

## 2020-10-26 NOTE — PROGRESS NOTES
PT DAILY TREATMENT NOTE - [facility] 2-15    Patient Name: Keyur Norman  Date:10/26/2020  : 1935  [x]  Patient  Verified  Payor: Karen Nicolas / Plan: [place] MEDICARE PART A & B / Product Type: Medicare /    In time:157pm  Out time:252 pm  Total Treatment Time (min): 55  Total Timed Codes (min): 55  1:1 Treatment Time [facility] only):55   Visit #: 14  Treatment Area: Other abnormalities of gait and mobility [R26.89]    SUBJECTIVE  Pain Level (0-10 scale): 0/10  Any medication changes, allergies to medications, adverse drug reactions, diagnosis change, or new procedure performed?: [x] No    [] Yes (see summary sheet for update)  Subjective functional status/changes:   [] No changes reported  Pt reports having increased trouble hearing in R ear to see ENT on Wed. OBJECTIVE      43 min Therapeutic Exercise:  [x] See flow sheet :   Rationale: increase ROM, increase strength and improve coordination to improve the patients ability to increased activity tolerance and functional mobility in the community         12 min Gait Training:  _780_ feet with _no __ device on level surfaces with _supervision __ level of assist   Rationale: improve coordination and improve balance  to improve the patients ability to increase consistency with step size and correction of unsteadiness. With   [] TE   [] TA   [] neuro   [] other: Patient Education: [x] Review HEP    [] Progressed/Changed HEP based on:   [] positioning   [] body mechanics   [] transfers   [] heat/ice application    [] other:          Pain Level (0-10 scale) post treatment: 0/10    ASSESSMENT/Changes in Function:   Pt continues to tolerate ex and balance activities with increases to activities and resistance with ex.    Patient will continue to benefit from skilled PT services to modify and progress therapeutic interventions, address functional mobility deficits, address ROM deficits, address strength deficits, analyze and cue movement patterns and analyze and modify body mechanics/ergonomics to attain remaining goals.      [x]  See Plan of Care  []  See progress note/recertification  []  See Discharge Summary         Progress towards goals / Updated goals:  1)pt to have no falling instances with the least restrictive AD within 2-4 weeks Unmet, patient experienced a fall earlier this week  2)pt to be able to get under 13.5 seconds with TUG test Partially Met, pt performed TUG in 14.9 seconds  3)pt to be able to perform at least 12 reps with the 30 second sit <> stand test Met  4)pt to be able to walk community and household distances on uneven ground with the least restrictive device without significant unsteadiness Partially Met, pt still notes some unsteadiness on uneven ground  5)pt to be able to walk community and household distances on even ground with the least restrictive device without significant unsteadiness Met  6) pt to be independent with HEP prior to discharge Partially Met  7)pt to score at least 16 on DGI to reflect safety with dynamic mobility Partially Met, pt scored 15/24 today       PLAN  [x]  Upgrade activities as tolerated     [x]  Continue plan of care  []  Update interventions per flow sheet       []  Discharge due to:_  []  Other:_      Yvette Singletary, PTA 10/26/2020

## 2020-10-28 ENCOUNTER — HOSPITAL ENCOUNTER (OUTPATIENT)
Dept: PHYSICAL THERAPY | Age: 85
Discharge: HOME | End: 2020-10-28
Payer: MEDICARE

## 2020-10-28 PROCEDURE — 97110 THERAPEUTIC EXERCISES: CPT

## 2020-10-28 NOTE — PROGRESS NOTES
PT DAILY TREATMENT NOTE - [facility] 2-15    Patient Name: Zeina Rae  Date:10/28/2020  : 1935  [x]  Patient  Verified  Payor: Ofe Willoughby / Plan: [place] MEDICARE PART A & B / Product Type: Medicare /    In time: 158p  Out time: 252p  Total Treatment Time (min): 54  Total Timed Codes (min): 54  1:1 Treatment Time ([address] only): 54   Visit #:  15    Treatment Area: Other abnormalities of gait and mobility [R26.89]    SUBJECTIVE  Pain Level (0-10 scale): 0/10  Any medication changes, allergies to medications, adverse drug reactions, diagnosis change, or new procedure performed?: [x] No    [] Yes (see summary sheet for update)  Subjective functional status/changes:   [] No changes reported  Pt reports he has no pain but, he states that the bottoms of his feet have been sore. OBJECTIVE    54 min Therapeutic Exercise:  [x] See flow sheet :   Rationale: increase ROM, increase strength, improve coordination and improve balance to improve the patients ability to increase functional mobility. With   [x] TE   [] TA   [] neuro   [] other: Patient Education: [x] Review HEP    [] Progressed/Changed HEP based on:   [] positioning   [] body mechanics   [] transfers   [] heat/ice application    [] other:      Other Objective/Functional Measures: pt has hard time hearing, visual demonstration is important for him to be able to understand. Pain Level (0-10 scale) post treatment: 0/10    ASSESSMENT/Changes in Function:   Pt is able to complete exercises with mod cueing and visual demonstration from therapist for correct technique. Pt had most difficulty with lateral step ups and needed to pay close attention to step negotiation while completing exercise to make sure feet were able to clear box. Pt was able to complete STS using 0 UE for support.   Patient will continue to benefit from skilled PT services to modify and progress therapeutic interventions, address functional mobility deficits, address ROM deficits, address strength deficits, analyze and address soft tissue restrictions, analyze and cue movement patterns and analyze and modify body mechanics/ergonomics to attain remaining goals.      [x]  See Plan of Care  []  See progress note/recertification  []  See Discharge Summary         Progress towards goals / Updated goals:  1)pt to have no falling instances with the least restrictive AD within 2-4 weeks Unmet, patient experienced a fall earlier this week  2)pt to be able to get under 13.5 seconds with TUG test Partially Met, pt performed TUG in 14.9 seconds  3)pt to be able to perform at least 12 reps with the 30 second sit <> stand test Met  4)pt to be able to walk community and household distances on uneven ground with the least restrictive device without significant unsteadiness Partially Met, pt still notes some unsteadiness on uneven ground  5)pt to be able to walk community and household distances on even ground with the least restrictive device without significant unsteadiness Met  6) pt to be independent with HEP prior to discharge Partially Met  7)pt to score at least 16 on DGI to reflect safety with dynamic mobility Partially Met, pt scored 15/24 today       PLAN  [x]  Upgrade activities as tolerated     [x]  Continue plan of care  []  Update interventions per flow sheet       []  Discharge due to:_  []  Other:_      DANIEL Riley  10/28/2020

## 2020-11-02 ENCOUNTER — HOSPITAL ENCOUNTER (OUTPATIENT)
Dept: PHYSICAL THERAPY | Age: 85
Discharge: HOME | End: 2020-11-02
Payer: MEDICARE

## 2020-11-02 PROCEDURE — 97110 THERAPEUTIC EXERCISES: CPT

## 2020-11-04 ENCOUNTER — HOSPITAL ENCOUNTER (OUTPATIENT)
Dept: PHYSICAL THERAPY | Age: 85
Discharge: HOME | End: 2020-11-04
Payer: MEDICARE

## 2020-11-04 PROCEDURE — 97110 THERAPEUTIC EXERCISES: CPT

## 2020-11-04 NOTE — PROGRESS NOTES
PT DAILY TREATMENT NOTE - [facility] 2-15    Patient Name: Corrie Mock  Date:2020  : 1935  [x]  Patient  Verified  Payor: Waleska Metz / Plan: [place] MEDICARE PART A & B / Product Type: Medicare /    In time:   Out time: 302p  Total Treatment Time (min): 60   Total Timed Codes (min): 60  1:1 Treatment Time ([address] only): 60   Visit #:  17    Treatment Area: Other abnormalities of gait and mobility [R26.89]    SUBJECTIVE  Pain Level (0-10 scale): 0/10  Any medication changes, allergies to medications, adverse drug reactions, diagnosis change, or new procedure performed?: [x] No    [] Yes (see summary sheet for update)  Subjective functional status/changes:   [] No changes reported  Pt reports he feels good today and has no pain    OBJECTIVE  60 min Therapeutic Exercise:  [x] See flow sheet :   Rationale: increase ROM, increase strength, improve coordination and improve balance to improve the patients ability to increase functional mobility. With   [x] TE   [] TA   [] neuro   [] other: Patient Education: [x] Review HEP    [] Progressed/Changed HEP based on:   [] positioning   [] body mechanics   [] transfers   [] heat/ice application    [] other:      Other Objective/Functional Measures: pt needs repeated visual demonstration for appropriate understanding due to hearing deficit. Pain Level (0-10 scale) post treatment: 0/10    ASSESSMENT/Changes in Function:   Pt tolerated therapy session well today. New exercises were added to better challenge patients balance. Pt had much difficulty with SLS and was not able to tolerate for more than a couple seconds at a time. Pt requires mod verbal and tactile cueing from therapist to complete exercises with correct technique.   Patient will continue to benefit from skilled PT services to modify and progress therapeutic interventions, address functional mobility deficits, address ROM deficits, address strength deficits, analyze and address soft tissue restrictions, analyze and cue movement patterns and analyze and modify body mechanics/ergonomics to attain remaining goals.      [x]  See Plan of Care  []  See progress note/recertification  []  See Discharge Summary         Progress towards goals / Updated goals:  1)pt to have no falling instances with the least restrictive AD within 2-4 weeks Unmet, patient experienced a fall earlier this week  2)pt to be able to get under 13.5 seconds with TUG test Partially Met, pt performed TUG in 14.9 seconds  3)pt to be able to perform at least 12 reps with the 30 second sit <> stand test Met  4)pt to be able to walk community and household distances on uneven ground with the least restrictive device without significant unsteadiness Partially Met, pt still notes some unsteadiness on uneven ground  5)pt to be able to walk community and household distances on even ground with the least restrictive device without significant unsteadiness Met  6) pt to be independent with HEP prior to discharge Partially Met  7)pt to score at least 16 on DGI to reflect safety with dynamic mobility Partially Met, pt scored 15/24 today    PLAN  [x]  Upgrade activities as tolerated     [x]  Continue plan of care  []  Update interventions per flow sheet       []  Discharge due to:_  []  Other:_      DANIEL Brown  11/4/2020

## 2020-11-06 ENCOUNTER — HOSPITAL ENCOUNTER (OUTPATIENT)
Dept: PHYSICAL THERAPY | Age: 85
Discharge: HOME | End: 2020-11-06
Payer: MEDICARE

## 2020-11-06 PROCEDURE — 97110 THERAPEUTIC EXERCISES: CPT

## 2020-11-06 PROCEDURE — 97530 THERAPEUTIC ACTIVITIES: CPT

## 2020-11-06 NOTE — PROGRESS NOTES
PT DAILY TREATMENT NOTE - [facility] 2-15    Patient Name: Claudio Campos  Date:2020  : 1935  [x]  Patient  Verified  Payor: Lindsay Ybarra / Plan: [place] MEDICARE PART A & B / Product Type: Medicare /    In time:2:00PM  Out time: 3:00PM  Total Treatment Time (min): 60  Total Timed Codes (min): 60  1:1 Treatment Time ( [address] only): 60  Visit #: 18    Treatment Area: Other abnormalities of gait and mobility [R26.89]    SUBJECTIVE  Pain Level (0-10 scale): 0/10    Any medication changes, allergies to medications, adverse drug reactions, diagnosis change, or new procedure performed?: [x] No    [] Yes (see summary sheet for update)    Subjective functional status/changes:   Pt reports that he has been walking every day and denies falling or close calls. Feels L leg is still weak, but feels that stamina and strength have improved. Denies dizziness. OBJECTIVE    Hip:  Strength AROM PROM     Right Left Right Left Right Left    Flexion 4 4-        Extension          Abduction          Adduction          IR          ER         Knee:  Strength AROM PROM     Right Left Right Left Right Left    Flexion 5 4        Extension 5 4       Ankle  Strength AROM PROM     Right Left Right Left Right Left    Dorsiflexion 5 4        Platarflexion 5 4+        Inversion          Eversion         *All strength measures are on a scale with 5 as a maximum, if a space is left blank it was not tested.     6 minute walk test: 6.8 laps    TUG (no SPC): 13.22 seconds    30 second sit to stand: 8    CANNON Balance Test: 39/56    DGI: 16/24    L Tandem: 5 seconds  R Tandem : 17 seconds    SLS bilat: 1.5 seconds bilat      30 min Therapeutic Exercise:  [x] See flow sheet :   Rationale: increase strength, improve balance and increase proprioception to improve the patients ability to improve safety and independence with ADLs     30 min Therapeutic Activity:  [x]  See flow sheet :   Rationale: increase ROM, increase strength, improve balance and increase proprioception  to improve the patients ability to improve safety and independence with ADLs              With   [x] TE   [] TA   [] neuro   [] other: Patient Education: [x] Review HEP    [] Progressed/Changed HEP based on:   [] positioning   [] body mechanics   [] transfers   [] heat/ice application    [] other:        Pain Level (0-10 scale) post treatment: 0/10    ASSESSMENT/Changes in Function:   Patient has been seen for 18 sessions of PT with focus on LE strengthening, balance and aerobic training and patient has made progress with functional testing, but continues to have L LE deficits. While patient continues to present with functional deficits, expect deficits to continue to improve with continued performance of HEP. At this point patient no longer requires skilled PT services. Discharge patient from PT at this time.       []  See Plan of Care  []  See progress note/recertification  [x]  See Discharge Summary         Progress towards goals / Updated goals:  See Discharge Note    PLAN  []  Upgrade activities as tolerated     []  Continue plan of care  []  Update interventions per flow sheet       [x]  Discharge due to: independence with HEP  []  Other:_      Genesis Harris, PT, DPT 11/6/2020

## 2020-11-06 NOTE — ANCILLARY DISCHARGE INSTRUCTIONS
[address] [address] [place] [address], [address] [place] Ph: [phone]     Fax: [phone] Discharge Summary 2-15 Patient name: Claudio Campos  : 1935  Provider#: [id number] Referral source: Etelvina Willoughby MD     
Medical/Treatment Diagnosis: Other abnormalities of gait and mobility [R26.89] Prior Hospitalization: see medical history Comorbidities: See Plan of Care Prior Level of Function: See Plan of Care Medications: Verified on Patient Summary List 
 
Start of Care: 9/10/2020      Onset Date: 9/10/2020 Visits from Start of Care: 18     Missed Visits: 0 Reporting Period : 9/10/2020  to 2020 Assessment/Summary of care:  
 
Patient has been seen for 18 sessions of PT with focus on LE strengthening, balance and aerobic training and patient has made progress with functional testing, but continues to have L LE deficits. While patient continues to present with functional deficits, expect deficits to continue to improve with continued performance of HEP. At this point patient no longer requires skilled PT services. Discharge patient from PT at this time. Goal: pt to have no falling instances with the least restrictive AD within 2-4 weeks  
Status at last note/certification: NA Status at discharge: MET Goal: pt to be able to get under 13.5 seconds with TUG test 
Status at last note/certification: NA Status at discharge: MET (13.22) Goal: pt to be able to perform at least 12 reps with the 30 second sit <> stand test 
Status at last note/certification: NA Status at discharge: Not Met (8) Goal: pt to be able to walk community and household distances on uneven ground with the least restrictive device without significant unsteadiness Status at last note/certification: NA Status at discharge: Met with some mild unsteadiness Goal: pt to be able to walk community and household distances on even ground with the least restrictive device without significant unsteadiness Status at last note/certification: NA Status at discharge: Met with some mild unsteadiness Goal: pt to be independent with HEP prior to discharge Status at last note/certification: NA Status at discharge: Met 
 
Goal: pt to score at least 16 on DGI to reflect safety with dynamic mobility  
Status at last note/certification: 
Status at discharge: MET 
 
RECOMMENDATIONS: 
[x]Discontinue therapy: [x]Patient has reached or is progressing toward set goals []Patient is non-compliant or has abdicated 
   []Due to lack of appreciable progress towards set goals []Other Dorine Burkitt, PT, DPT 11/6/2020

## 2021-04-19 ENCOUNTER — TRANSCRIBE ORDER (OUTPATIENT)
Dept: SCHEDULING | Age: 86
End: 2021-04-19

## 2021-04-19 DIAGNOSIS — R06.9 ABNORMAL RESPIRATORY RATE: Primary | ICD-10-CM

## 2021-04-26 ENCOUNTER — APPOINTMENT (OUTPATIENT)
Dept: CT IMAGING | Age: 86
End: 2021-04-26
Attending: EMERGENCY MEDICINE
Payer: MEDICARE

## 2021-04-26 ENCOUNTER — HOSPITAL ENCOUNTER (EMERGENCY)
Age: 86
Discharge: HOME | End: 2021-04-26
Attending: EMERGENCY MEDICINE
Payer: MEDICARE

## 2021-04-26 VITALS
SYSTOLIC BLOOD PRESSURE: 189 MMHG | HEIGHT: 68 IN | BODY MASS INDEX: 27.28 KG/M2 | HEART RATE: 79 BPM | TEMPERATURE: 98.3 F | DIASTOLIC BLOOD PRESSURE: 102 MMHG | WEIGHT: 180 LBS | RESPIRATION RATE: 24 BRPM | OXYGEN SATURATION: 99 %

## 2021-04-26 DIAGNOSIS — S70.01XA CONTUSION OF RIGHT HIP, INITIAL ENCOUNTER: Primary | ICD-10-CM

## 2021-04-26 LAB
ALBUMIN SERPL-MCNC: 3.5 G/DL (ref 3.5–5)
ALBUMIN/GLOB SERPL: 1 {RATIO} (ref 1.1–2.2)
ALP SERPL-CCNC: 109 U/L (ref 45–117)
ALT SERPL-CCNC: 15 U/L (ref 12–78)
AMORPH CRY URNS QL MICRO: ABNORMAL
ANION GAP SERPL CALC-SCNC: 11 MMOL/L (ref 5–15)
APPEARANCE UR: CLEAR
AST SERPL W P-5'-P-CCNC: 18 U/L (ref 15–37)
ATRIAL RATE: 77 BPM
BACTERIA URNS QL MICRO: NEGATIVE /HPF
BASOPHILS # BLD: 0.1 K/UL (ref 0–0.2)
BASOPHILS NFR BLD: 1 % (ref 0–2.5)
BILIRUB SERPL-MCNC: 0.7 MG/DL (ref 0.2–1)
BILIRUB UR QL: NEGATIVE
BUN SERPL-MCNC: 17 MG/DL (ref 6–20)
BUN/CREAT SERPL: 14 (ref 12–20)
CA-I BLD-MCNC: 9.7 MG/DL (ref 8.5–10.1)
CALCULATED P AXIS, ECG09: 39 DEGREES
CALCULATED R AXIS, ECG10: 5 DEGREES
CHLORIDE SERPL-SCNC: 105 MMOL/L (ref 97–108)
CO2 SERPL-SCNC: 25 MMOL/L (ref 21–32)
COLOR UR: ABNORMAL
CREAT SERPL-MCNC: 1.2 MG/DL (ref 0.7–1.3)
DIAGNOSIS, 93000: NORMAL
EOSINOPHIL # BLD: 0.6 K/UL (ref 0–0.7)
EOSINOPHIL NFR BLD: 8 % (ref 0.9–2.9)
ERYTHROCYTE [DISTWIDTH] IN BLOOD BY AUTOMATED COUNT: 17.9 % (ref 11.5–14.5)
GLOBULIN SER CALC-MCNC: 3.5 G/DL (ref 2–4)
GLUCOSE SERPL-MCNC: 131 MG/DL (ref 65–100)
GLUCOSE UR STRIP.AUTO-MCNC: NEGATIVE MG/DL
HCT VFR BLD AUTO: 37 % (ref 41–53)
HGB BLD-MCNC: 11.9 G/DL (ref 13.5–17.5)
HGB UR QL STRIP: ABNORMAL
INR PPP: 1.1 (ref 0.9–1.1)
KETONES UR QL STRIP.AUTO: NEGATIVE MG/DL
LEUKOCYTE ESTERASE UR QL STRIP.AUTO: NEGATIVE
LYMPHOCYTES # BLD: 0.9 K/UL (ref 1–4.8)
LYMPHOCYTES NFR BLD: 12 % (ref 20.5–51.1)
MAGNESIUM SERPL-MCNC: 1.5 MG/DL (ref 1.6–2.4)
MCH RBC QN AUTO: 28.1 PG (ref 31–34)
MCHC RBC AUTO-ENTMCNC: 32.1 G/DL (ref 31–36)
MCV RBC AUTO: 87.7 FL (ref 80–100)
MONOCYTES # BLD: 0.7 K/UL (ref 0.2–2.4)
MONOCYTES NFR BLD: 9 % (ref 1.7–9.3)
NEUTS SEG # BLD: 5.4 K/UL (ref 1.8–7.7)
NEUTS SEG NFR BLD: 70 % (ref 42–75)
NITRITE UR QL STRIP.AUTO: NEGATIVE
NRBC # BLD: 0.04 K/UL
NRBC BLD-RTO: 0.5 PER 100 WBC
P-R INTERVAL, ECG05: 180 MS
PH UR STRIP: 5.5 [PH] (ref 5–8)
PLATELET # BLD AUTO: 154 K/UL (ref 150–400)
PMV BLD AUTO: 10.1 FL (ref 6.5–11.5)
POTASSIUM SERPL-SCNC: 4.2 MMOL/L (ref 3.5–5.1)
PROT SERPL-MCNC: 7 G/DL (ref 6.4–8.2)
PROT UR STRIP-MCNC: 30 MG/DL
PROTHROMBIN TIME: 10.7 SEC (ref 9–11.1)
Q-T INTERVAL, ECG07: 385 MS
QRS DURATION, ECG06: 98 MS
QTC CALCULATION (BEZET), ECG08: 442 MS
RBC # BLD AUTO: 4.22 M/UL (ref 4.5–5.9)
RBC #/AREA URNS HPF: ABNORMAL /HPF (ref 0–3)
SODIUM SERPL-SCNC: 141 MMOL/L (ref 136–145)
SP GR UR REFRACTOMETRY: 1.02 (ref 1–1.03)
TROPONIN I SERPL-MCNC: <0.05 NG/ML
UROBILINOGEN UR QL STRIP.AUTO: 1 EU/DL (ref 0.2–1)
VENTRICULAR RATE, ECG03: 79 BPM
WBC # BLD AUTO: 7.7 K/UL (ref 4.4–11.3)
WBC URNS QL MICRO: ABNORMAL /HPF (ref 0–5)

## 2021-04-26 PROCEDURE — 85025 COMPLETE CBC W/AUTO DIFF WBC: CPT

## 2021-04-26 PROCEDURE — 81001 URINALYSIS AUTO W/SCOPE: CPT

## 2021-04-26 PROCEDURE — 83735 ASSAY OF MAGNESIUM: CPT

## 2021-04-26 PROCEDURE — 36415 COLL VENOUS BLD VENIPUNCTURE: CPT

## 2021-04-26 PROCEDURE — 93005 ELECTROCARDIOGRAM TRACING: CPT

## 2021-04-26 PROCEDURE — 84484 ASSAY OF TROPONIN QUANT: CPT

## 2021-04-26 PROCEDURE — 72125 CT NECK SPINE W/O DYE: CPT

## 2021-04-26 PROCEDURE — 99283 EMERGENCY DEPT VISIT LOW MDM: CPT

## 2021-04-26 PROCEDURE — 85610 PROTHROMBIN TIME: CPT

## 2021-04-26 PROCEDURE — 72192 CT PELVIS W/O DYE: CPT

## 2021-04-26 PROCEDURE — 74011250636 HC RX REV CODE- 250/636: Performed by: EMERGENCY MEDICINE

## 2021-04-26 PROCEDURE — 80053 COMPREHEN METABOLIC PANEL: CPT

## 2021-04-26 PROCEDURE — 70450 CT HEAD/BRAIN W/O DYE: CPT

## 2021-04-26 PROCEDURE — 96374 THER/PROPH/DIAG INJ IV PUSH: CPT

## 2021-04-26 RX ORDER — KETOROLAC TROMETHAMINE 30 MG/ML
30 INJECTION, SOLUTION INTRAMUSCULAR; INTRAVENOUS
Status: COMPLETED | OUTPATIENT
Start: 2021-04-26 | End: 2021-04-26

## 2021-04-26 RX ORDER — ACETAMINOPHEN 325 MG/1
650 TABLET ORAL
Qty: 20 TAB | Refills: 0 | Status: SHIPPED | OUTPATIENT
Start: 2021-04-26

## 2021-04-26 RX ADMIN — KETOROLAC TROMETHAMINE 30 MG: 30 INJECTION, SOLUTION INTRAMUSCULAR at 11:05

## 2021-04-26 NOTE — ED PROVIDER NOTES
EMERGENCY DEPARTMENT HISTORY AND PHYSICAL EXAM      Date: 4/26/2021  Patient Name: Mario Del Valle    History of Presenting Illness     Chief Complaint   Patient presents with   Carroll Fall    Leg Pain       History Provided By: Patient    HPI: Mario Del Valle, 80 y.o. male with a past medical history significant hypertension, malignancy and Skin cancer and prostate cancer and CVA presents to the ED with cc of frequent falls at home for the past 3 days; patient normally ambulatory without assistance, and is able to do yard work; patient complaining of right hip pain with movement; patient not able to ratet pain; 3 days ago pt started using his cane about the house; decreased activity yesterday due to pain in his rt hip    There are no other complaints, changes, or physical findings at this time. PCP: Cyrus Aguilar MD    No current facility-administered medications on file prior to encounter. No current outpatient medications on file prior to encounter. Past History     Past Medical History:  Past Medical History:   Diagnosis Date    Ear problems 7/22/2020    Hearing loss 7/22/2020    HTN (hypertension)     Kidney stones     Prostate [facility] ([facility] [address]     Skin cancer        Past Surgical History:  History reviewed. No pertinent surgical history. Family History:  History reviewed. No pertinent family history. Social History:  Social History     Tobacco Use    Smoking status: Never Smoker    Smokeless tobacco: Never Used   Substance Use Topics    Alcohol use: Not Currently    Drug use: Never       Allergies:  No Known Allergies      Review of Systems     Review of Systems   Constitutional: Negative for chills and fever. HENT: Negative for rhinorrhea and sneezing. Eyes: Negative for pain and visual disturbance. Respiratory: Negative for cough and shortness of breath. Cardiovascular: Negative for chest pain and leg swelling.    Gastrointestinal: Negative for abdominal pain and vomiting. Endocrine: Negative for polydipsia and polyuria. Genitourinary: Negative for dysuria and hematuria. Musculoskeletal: Negative for back pain and neck pain. Skin: Negative for color change and pallor. Neurological: Negative for weakness and numbness. Psychiatric/Behavioral: Negative. Physical Exam     Physical Exam  Vitals signs and nursing note reviewed. Constitutional:       Appearance: Normal appearance. HENT:      Head: Normocephalic and atraumatic. Nose: Nose normal.      Mouth/Throat:      Mouth: Mucous membranes are moist.      Pharynx: Oropharynx is clear. Eyes:      Extraocular Movements: Extraocular movements intact. Conjunctiva/sclera: Conjunctivae normal.      Pupils: Pupils are equal, round, and reactive to light. Neck:      Musculoskeletal: Normal range of motion and neck supple. Cardiovascular:      Rate and Rhythm: Normal rate and regular rhythm. Pulses: Normal pulses. Heart sounds: Normal heart sounds. Pulmonary:      Effort: Pulmonary effort is normal.      Breath sounds: Normal breath sounds. Abdominal:      General: Bowel sounds are normal.      Palpations: Abdomen is soft. Musculoskeletal: Normal range of motion. General: No swelling, tenderness or deformity. Right shoulder: He exhibits pain. He exhibits normal range of motion, no tenderness, no bony tenderness, no crepitus, no deformity, normal pulse and normal strength. Skin:     General: Skin is warm and dry. Capillary Refill: Capillary refill takes less than 2 seconds. Neurological:      General: No focal deficit present. Mental Status: He is alert and oriented to person, place, and time. Psychiatric:         Mood and Affect: Mood normal.         Behavior: Behavior normal.         Lab and Diagnostic Study Results     Labs -   No results found for this or any previous visit (from the past 12 hour(s)).     Radiologic Studies -   @lastxrresult@  CT Results  (Last 48 hours)    None        CXR Results  (Last 48 hours)    None            Medical Decision Making   - I am the first provider for this patient. - I reviewed the vital signs, available nursing notes, past medical history, past surgical history, family history and social history. - Initial assessment performed. The patients presenting problems have been discussed, and they are in agreement with the care plan formulated and outlined with them. I have encouraged them to ask questions as they arise throughout their visit. Vital Signs-Reviewed the patient's vital signs. No data found. Records Reviewed: Nursing Notes    The patient presents with right hip pain with a differential diagnosis of closed fracture, dislocation, contusion      ED Course:     ED Course as of Apr 26 1126   Mon Apr 26, 2021   0907 EKG sinus rhythm rate 79 [place] interval 188 QT interval 385 inverted T waves V3 through V6    [SB]   1124 Home health/case management thank you for your consult    [SB]      ED Course User Index  [SB] Karlyn Dakin, MD       Provider Notes (Medical Decision Making): MDM       Procedures   Medical Decision Makingedical Decision Making  Performed by: Kathe Rachel MD  PROCEDURES:  Procedures       Disposition   Disposition: Condition stable and improved  DC- Adult Discharges: All of the diagnostic tests were reviewed and questions answered. Diagnosis, care plan and treatment options were discussed. The patient understands the instructions and will follow up as directed. The patients results have been reviewed with them. They have been counseled regarding their diagnosis. The patient and caregiver verbally convey understanding and agreement of the signs, symptoms, diagnosis, treatment and prognosis and additionally agrees to follow up as recommended with their PCP in 24 - 48 hours. They also agree with the care-plan and convey that all of their questions have been answered. I have also put together some discharge instructions for them that include: 1) educational information regarding their diagnosis, 2) how to care for their diagnosis at home, as well a 3) list of reasons why they would want to return to the ED prior to their follow-up appointment, should their condition change. DISCHARGE PLAN:  1. There are no discharge medications for this patient. 2.   Follow-up Information    None       3. Return to ED if worse   4. There are no discharge medications for this patient. Diagnosis     Clinical Impression: No diagnosis found. Attestations:    Luz Marina Andrews MD    Please note that this dictation was completed with [employer], the computer voice recognition software. Quite often unanticipated grammatical, syntax, homophones, and other interpretive errors are inadvertently transcribed by the computer software. Please disregard these errors. Please excuse any errors that have escaped final proofreading. Thank you.

## 2021-04-27 NOTE — PROGRESS NOTES
Caregiver called and stated that she chose the wrong home health agency. Stated that she found the paperwork and pt was seen by [facility] in the past. Wanted to use the same agency. [facility] called and referral cancelled. Sent to [facility].

## 2021-04-27 NOTE — PROGRESS NOTES
Emergency provider ordered case management consult to arrange home health services. Choice letter signed by caregiver in front of patient. Care giver chose [facility]. Referral called and faxed.

## 2021-04-29 ENCOUNTER — HOSPITAL ENCOUNTER (OUTPATIENT)
Dept: NON INVASIVE DIAGNOSTICS | Age: 86
Discharge: HOME | End: 2021-04-29
Attending: FAMILY MEDICINE
Payer: MEDICARE

## 2021-04-29 DIAGNOSIS — R06.9 ABNORMAL RESPIRATORY RATE: ICD-10-CM

## 2021-04-29 PROCEDURE — 93923 UPR/LXTR ART STDY 3+ LVLS: CPT

## 2021-04-30 LAB
LEFT ARM BP: 160 MMHG
LEFT TBI: 0.89
LEFT TOE PRESSURE: 142 MMHG
RIGHT ARM BP: 159 MMHG
RIGHT TBI: 0.91
RIGHT TOE PRESSURE: 146 MMHG

## 2021-07-04 ENCOUNTER — HOSPITAL ENCOUNTER (EMERGENCY)
Age: 86
Discharge: HOME | End: 2021-07-04
Attending: EMERGENCY MEDICINE
Payer: MEDICARE

## 2021-07-04 ENCOUNTER — APPOINTMENT (OUTPATIENT)
Dept: CT IMAGING | Age: 86
End: 2021-07-04
Attending: EMERGENCY MEDICINE
Payer: MEDICARE

## 2021-07-04 VITALS
DIASTOLIC BLOOD PRESSURE: 95 MMHG | RESPIRATION RATE: 18 BRPM | HEIGHT: 67 IN | HEART RATE: 71 BPM | WEIGHT: 180 LBS | OXYGEN SATURATION: 96 % | SYSTOLIC BLOOD PRESSURE: 149 MMHG | BODY MASS INDEX: 28.25 KG/M2 | TEMPERATURE: 97.8 F

## 2021-07-04 DIAGNOSIS — N20.0 KIDNEY STONE: Primary | ICD-10-CM

## 2021-07-04 LAB
ALBUMIN SERPL-MCNC: 3.4 G/DL (ref 3.5–5)
ALBUMIN/GLOB SERPL: 1.1 {RATIO} (ref 1.1–2.2)
ALP SERPL-CCNC: 113 U/L (ref 45–117)
ALT SERPL-CCNC: 27 U/L (ref 12–78)
ANION GAP SERPL CALC-SCNC: 11 MMOL/L (ref 5–15)
APPEARANCE UR: CLEAR
AST SERPL W P-5'-P-CCNC: 25 U/L (ref 15–37)
BASOPHILS # BLD: 0 K/UL (ref 0–0.2)
BASOPHILS NFR BLD: 1 % (ref 0–2.5)
BILIRUB SERPL-MCNC: 0.5 MG/DL (ref 0.2–1)
BILIRUB UR QL: NEGATIVE
BUN SERPL-MCNC: 20 MG/DL (ref 6–20)
BUN/CREAT SERPL: 16 (ref 12–20)
CA-I BLD-MCNC: 10 MG/DL (ref 8.5–10.1)
CHLORIDE SERPL-SCNC: 109 MMOL/L (ref 97–108)
CO2 SERPL-SCNC: 22 MMOL/L (ref 21–32)
COLOR UR: NORMAL
CREAT SERPL-MCNC: 1.23 MG/DL (ref 0.7–1.3)
EOSINOPHIL # BLD: 0.5 K/UL (ref 0–0.7)
EOSINOPHIL NFR BLD: 9 % (ref 0.9–2.9)
ERYTHROCYTE [DISTWIDTH] IN BLOOD BY AUTOMATED COUNT: 17.2 % (ref 11.5–14.5)
GLOBULIN SER CALC-MCNC: 3.2 G/DL (ref 2–4)
GLUCOSE SERPL-MCNC: 174 MG/DL (ref 65–100)
GLUCOSE UR STRIP.AUTO-MCNC: NEGATIVE MG/DL
HCT VFR BLD AUTO: 36.8 % (ref 41–53)
HGB BLD-MCNC: 12 G/DL (ref 13.5–17.5)
HGB UR QL STRIP: NEGATIVE
INR PPP: 1 (ref 0.9–1.1)
KETONES UR QL STRIP.AUTO: NEGATIVE MG/DL
LEUKOCYTE ESTERASE UR QL STRIP.AUTO: NEGATIVE
LYMPHOCYTES # BLD: 1.2 K/UL (ref 1–4.8)
LYMPHOCYTES NFR BLD: 19 % (ref 20.5–51.1)
MCH RBC QN AUTO: 28.6 PG (ref 31–34)
MCHC RBC AUTO-ENTMCNC: 32.5 G/DL (ref 31–36)
MCV RBC AUTO: 88 FL (ref 80–100)
MONOCYTES # BLD: 0.5 K/UL (ref 0.2–2.4)
MONOCYTES NFR BLD: 8 % (ref 1.7–9.3)
NEUTS SEG # BLD: 3.9 K/UL (ref 1.8–7.7)
NEUTS SEG NFR BLD: 63 % (ref 42–75)
NITRITE UR QL STRIP.AUTO: NEGATIVE
NRBC # BLD: 0 K/UL
NRBC BLD-RTO: 0 PER 100 WBC
PH UR STRIP: 6 [PH] (ref 5–8)
PLATELET # BLD AUTO: 148 K/UL (ref 150–400)
PMV BLD AUTO: 8.6 FL (ref 6.5–11.5)
POTASSIUM SERPL-SCNC: 3.9 MMOL/L (ref 3.5–5.1)
PROT SERPL-MCNC: 6.6 G/DL (ref 6.4–8.2)
PROT UR STRIP-MCNC: NEGATIVE MG/DL
PROTHROMBIN TIME: 10.3 SEC (ref 9–11.1)
RBC # BLD AUTO: 4.19 M/UL (ref 4.5–5.9)
SODIUM SERPL-SCNC: 142 MMOL/L (ref 136–145)
SP GR UR REFRACTOMETRY: 1.02 (ref 1–1.03)
UROBILINOGEN UR QL STRIP.AUTO: 0.2 EU/DL (ref 0.2–1)
WBC # BLD AUTO: 6.2 K/UL (ref 4.4–11.3)

## 2021-07-04 PROCEDURE — 82746 ASSAY OF FOLIC ACID SERUM: CPT

## 2021-07-04 PROCEDURE — 96374 THER/PROPH/DIAG INJ IV PUSH: CPT

## 2021-07-04 PROCEDURE — 74011250636 HC RX REV CODE- 250/636: Performed by: EMERGENCY MEDICINE

## 2021-07-04 PROCEDURE — 74176 CT ABD & PELVIS W/O CONTRAST: CPT

## 2021-07-04 PROCEDURE — 85610 PROTHROMBIN TIME: CPT

## 2021-07-04 PROCEDURE — 36415 COLL VENOUS BLD VENIPUNCTURE: CPT

## 2021-07-04 PROCEDURE — 96361 HYDRATE IV INFUSION ADD-ON: CPT

## 2021-07-04 PROCEDURE — 80053 COMPREHEN METABOLIC PANEL: CPT

## 2021-07-04 PROCEDURE — 81003 URINALYSIS AUTO W/O SCOPE: CPT

## 2021-07-04 PROCEDURE — 85025 COMPLETE CBC W/AUTO DIFF WBC: CPT

## 2021-07-04 PROCEDURE — 96375 TX/PRO/DX INJ NEW DRUG ADDON: CPT

## 2021-07-04 PROCEDURE — 99284 EMERGENCY DEPT VISIT MOD MDM: CPT

## 2021-07-04 RX ORDER — SODIUM CHLORIDE 9 MG/ML
125 INJECTION, SOLUTION INTRAVENOUS ONCE
Status: COMPLETED | OUTPATIENT
Start: 2021-07-04 | End: 2021-07-04

## 2021-07-04 RX ORDER — ALLOPURINOL 300 MG/1
300 TABLET ORAL EVERY EVENING
COMMUNITY

## 2021-07-04 RX ORDER — METFORMIN HYDROCHLORIDE 500 MG/1
500 TABLET ORAL
COMMUNITY

## 2021-07-04 RX ORDER — ASPIRIN 81 MG/1
TABLET ORAL DAILY
COMMUNITY

## 2021-07-04 RX ORDER — AMLODIPINE BESYLATE 5 MG/1
5 TABLET ORAL EVERY EVENING
COMMUNITY

## 2021-07-04 RX ORDER — ONDANSETRON 2 MG/ML
4 INJECTION INTRAMUSCULAR; INTRAVENOUS
Status: COMPLETED | OUTPATIENT
Start: 2021-07-04 | End: 2021-07-04

## 2021-07-04 RX ORDER — FENTANYL CITRATE 50 UG/ML
50 INJECTION, SOLUTION INTRAMUSCULAR; INTRAVENOUS
Status: COMPLETED | OUTPATIENT
Start: 2021-07-04 | End: 2021-07-04

## 2021-07-04 RX ORDER — ATORVASTATIN CALCIUM 40 MG/1
40 TABLET, FILM COATED ORAL EVERY EVENING
COMMUNITY

## 2021-07-04 RX ORDER — ACETAMINOPHEN AND CODEINE PHOSPHATE 300; 30 MG/1; MG/1
1 TABLET ORAL
Qty: 12 TABLET | Refills: 0 | Status: SHIPPED | OUTPATIENT
Start: 2021-07-04 | End: 2021-07-07

## 2021-07-04 RX ORDER — CLOPIDOGREL BISULFATE 75 MG/1
75 TABLET ORAL
COMMUNITY

## 2021-07-04 RX ORDER — LISINOPRIL 40 MG/1
40 TABLET ORAL EVERY EVENING
Status: ON HOLD | COMMUNITY
End: 2022-07-05 | Stop reason: SDUPTHER

## 2021-07-04 RX ORDER — METOPROLOL TARTRATE 50 MG/1
50 TABLET ORAL EVERY MORNING
COMMUNITY
End: 2022-07-05

## 2021-07-04 RX ADMIN — ONDANSETRON 4 MG: 2 INJECTION INTRAMUSCULAR; INTRAVENOUS at 21:04

## 2021-07-04 RX ADMIN — SODIUM CHLORIDE 125 ML/HR: 9 INJECTION, SOLUTION INTRAVENOUS at 21:05

## 2021-07-04 RX ADMIN — FENTANYL CITRATE 50 MCG: 50 INJECTION INTRAMUSCULAR; INTRAVENOUS at 21:03

## 2021-07-04 NOTE — ED TRIAGE NOTES
Pt pt's wife, pt had an episode around 4pm today where while walking he was leaning on his left side so she thought he might be having a stroke. Wife called the rescue squad who said he was having a muscle spasm. This self resolved. Pt is coming in now because per wife Edvin De La Rosa had a stroke 2 years ago and I got to thinking of it and wanted him to be checked out. \" No deficits from last stroke per wife. Per pt what is bothering him currently is left flank pain. Pt does have hx of kidney stones.

## 2021-07-04 NOTE — ED PROVIDER NOTES
Patient presents with complaint of left flank and abdominal pain starting at 4 PM. He denies fever , chills, nausea or vomiting. The pain is worse with palpation. He denies chest pain, shortness of breath or back pain. Duration:  4 hours    Intensity: moderate    Modified by: palpation               Past Medical History:   Diagnosis Date    CVA [facility]cere[facility] accident) ([facility] 11/2019    Ear problems 7/22/2020    Hearing loss 7/22/2020    HTN (hypertension)     Kidney stones     Prostate cancer ([facility]     Skin cancer        No past surgical history on file. No family history on file. Social History     Socioeconomic History    Marital status:      Spouse name: Not on file    Number of children: Not on file    Years of education: Not on file    Highest education level: Not on file   Occupational History    Not on file   Tobacco Use    Smoking status: Never Smoker    Smokeless tobacco: Never Used   Substance and Sexual Activity    Alcohol use: Not Currently    Drug use: Never    Sexual activity: Not on file   Other Topics Concern    Not on file   Social History Narrative    Not on file     Social Determinants of Health     Financial Resource Strain:     Difficulty of Paying Living Expenses:    Food Insecurity:     Worried About Running Out of Food in the Last Year:     [address] in the Last Year:    Transportation Needs:     Lack of Transportation (Medical):      Lack of Transportation (Non-Medical):    Physical Activity:     Days of Exercise per Week:     Minutes of Exercise per Session:    Stress:     Feeling of Stress :    Social Connections:     Frequency of Communication with Friends and Family:     Frequency of Social Gatherings with Friends and Family:     Attends Denominational Services:     Active Member of Clubs or Organizations:     Attends Club or Organization Meetings:     Marital Status:    Intimate Partner Violence:     Fear of Current or Ex-Partner:  Emotionally Abused:     Physically Abused:     Sexually Abused: ALLERGIES: Patient has no known allergies. Review of Systems   Constitutional: Negative. Negative for chills and fever. HENT: Negative. Eyes: Negative. Respiratory: Negative. Negative for shortness of breath. Cardiovascular: Negative. Negative for chest pain. Gastrointestinal: Positive for abdominal pain. Endocrine: Negative. Genitourinary: Positive for flank pain. Skin: Negative. Allergic/Immunologic: Negative. Neurological: Negative. Hematological: Negative. Psychiatric/Behavioral: Negative. Vitals:    07/04/21 1950   BP: (!) 170/92   Pulse: 78   Resp: 18   Temp: 97.8 °F (36.6 °C)   SpO2: 97%   Weight: 81.6 kg (180 lb)   Height: 5' 7\" (1.702 m)            Physical Exam  Vitals and nursing note reviewed. HENT:      Head: Normocephalic and atraumatic. Eyes:      Extraocular Movements: Extraocular movements intact. Pupils: Pupils are equal, round, and reactive to light. Cardiovascular:      Rate and Rhythm: Normal rate and regular rhythm. Pulses: Normal pulses. Heart sounds: Normal heart sounds. Pulmonary:      Effort: Pulmonary effort is normal.      Breath sounds: Normal breath sounds. Abdominal:      General: Abdomen is flat. Bowel sounds are normal.      Palpations: Abdomen is soft. Tenderness: There is abdominal tenderness. There is no guarding or rebound. Musculoskeletal:         General: Normal range of motion. Cervical back: Normal range of motion and neck supple. Comments: Moderate tenderness on palpation of left flank. Right hip and pelvis non tender with good ROM. Skin:     General: Skin is warm and dry. Neurological:      General: No focal deficit present. Mental Status: He is oriented to person, place, and time. Mental status is at baseline.    Psychiatric:         Mood and Affect: Mood normal.         Behavior: Behavior normal. MDM  Number of Diagnoses or Management Options  Risk of Complications, Morbidity, and/or Mortality  Presenting problems: moderate  Diagnostic procedures: low  Management options: low    Patient Progress  Patient progress: stable         Procedures

## 2021-07-05 LAB — FOLATE SERPL-MCNC: 43.1 NG/ML (ref 5–21)

## 2021-07-05 NOTE — ED NOTES
Pt d/c with instructions and given rx for pain meds. Pt vss and documented. No rednessor swelling noted at iv site .  Pt instructed to f/u with pcp

## 2021-08-25 ENCOUNTER — APPOINTMENT (OUTPATIENT)
Dept: GENERAL RADIOLOGY | Age: 86
End: 2021-08-25
Attending: EMERGENCY MEDICINE
Payer: MEDICARE

## 2021-08-25 ENCOUNTER — APPOINTMENT (OUTPATIENT)
Dept: CT IMAGING | Age: 86
End: 2021-08-25
Attending: EMERGENCY MEDICINE
Payer: MEDICARE

## 2021-08-25 ENCOUNTER — HOSPITAL ENCOUNTER (EMERGENCY)
Age: 86
Discharge: HOME | End: 2021-08-25
Attending: EMERGENCY MEDICINE
Payer: MEDICARE

## 2021-08-25 VITALS
SYSTOLIC BLOOD PRESSURE: 162 MMHG | WEIGHT: 187 LBS | TEMPERATURE: 98 F | BODY MASS INDEX: 28.34 KG/M2 | HEART RATE: 66 BPM | RESPIRATION RATE: 20 BRPM | OXYGEN SATURATION: 99 % | DIASTOLIC BLOOD PRESSURE: 92 MMHG | HEIGHT: 68 IN

## 2021-08-25 DIAGNOSIS — R42 VERTIGO: ICD-10-CM

## 2021-08-25 DIAGNOSIS — S00.03XA CONTUSION OF SCALP, INITIAL ENCOUNTER: Primary | ICD-10-CM

## 2021-08-25 LAB
ALBUMIN SERPL-MCNC: 3.6 G/DL (ref 3.5–5)
ALBUMIN/GLOB SERPL: 1.1 {RATIO} (ref 1.1–2.2)
ALP SERPL-CCNC: 101 U/L (ref 45–117)
ALT SERPL-CCNC: 21 U/L (ref 12–78)
ANION GAP SERPL CALC-SCNC: 9 MMOL/L (ref 5–15)
APPEARANCE UR: CLEAR
AST SERPL W P-5'-P-CCNC: 23 U/L (ref 15–37)
BASOPHILS # BLD: 0.1 K/UL (ref 0–0.2)
BASOPHILS NFR BLD: 1 % (ref 0–2.5)
BILIRUB SERPL-MCNC: 0.5 MG/DL (ref 0.2–1)
BILIRUB UR QL: NEGATIVE
BUN SERPL-MCNC: 23 MG/DL (ref 6–20)
BUN/CREAT SERPL: 17 (ref 12–20)
CA-I BLD-MCNC: 10.5 MG/DL (ref 8.5–10.1)
CHLORIDE SERPL-SCNC: 112 MMOL/L (ref 97–108)
CO2 SERPL-SCNC: 25 MMOL/L (ref 21–32)
COLOR UR: NORMAL
CREAT SERPL-MCNC: 1.34 MG/DL (ref 0.7–1.3)
EOSINOPHIL # BLD: 0.4 K/UL (ref 0–0.7)
EOSINOPHIL NFR BLD: 6 % (ref 0.9–2.9)
ERYTHROCYTE [DISTWIDTH] IN BLOOD BY AUTOMATED COUNT: 17.1 % (ref 11.5–14.5)
GLOBULIN SER CALC-MCNC: 3.4 G/DL (ref 2–4)
GLUCOSE SERPL-MCNC: 101 MG/DL (ref 65–100)
GLUCOSE UR STRIP.AUTO-MCNC: NEGATIVE MG/DL
HCT VFR BLD AUTO: 36.9 % (ref 41–53)
HGB BLD-MCNC: 11.9 G/DL (ref 13.5–17.5)
HGB UR QL STRIP: NEGATIVE
INR PPP: 1.1 (ref 0.9–1.1)
KETONES UR QL STRIP.AUTO: NEGATIVE MG/DL
LEUKOCYTE ESTERASE UR QL STRIP.AUTO: NEGATIVE
LYMPHOCYTES # BLD: 1.4 K/UL (ref 1–4.8)
LYMPHOCYTES NFR BLD: 22 % (ref 20.5–51.1)
MAGNESIUM SERPL-MCNC: 2.1 MG/DL (ref 1.6–2.4)
MCH RBC QN AUTO: 28.6 PG (ref 31–34)
MCHC RBC AUTO-ENTMCNC: 32.2 G/DL (ref 31–36)
MCV RBC AUTO: 88.8 FL (ref 80–100)
MONOCYTES # BLD: 0.6 K/UL (ref 0.2–2.4)
MONOCYTES NFR BLD: 10 % (ref 1.7–9.3)
NEUTS SEG # BLD: 3.7 K/UL (ref 1.8–7.7)
NEUTS SEG NFR BLD: 61 % (ref 42–75)
NITRITE UR QL STRIP.AUTO: NEGATIVE
NRBC # BLD: 0.01 K/UL
NRBC BLD-RTO: 0.1 PER 100 WBC
PH UR STRIP: 5.5 [PH] (ref 5–8)
PLATELET # BLD AUTO: 158 K/UL (ref 150–400)
PMV BLD AUTO: 8.6 FL (ref 6.5–11.5)
POTASSIUM SERPL-SCNC: 4.6 MMOL/L (ref 3.5–5.1)
PROT SERPL-MCNC: 7 G/DL (ref 6.4–8.2)
PROT UR STRIP-MCNC: NEGATIVE MG/DL
PROTHROMBIN TIME: 10.5 SEC (ref 9–11.1)
RBC # BLD AUTO: 4.16 M/UL (ref 4.5–5.9)
SODIUM SERPL-SCNC: 146 MMOL/L (ref 136–145)
SP GR UR REFRACTOMETRY: 1.02 (ref 1–1.03)
TROPONIN I SERPL-MCNC: <0.05 NG/ML
UROBILINOGEN UR QL STRIP.AUTO: 0.2 EU/DL (ref 0.2–1)
WBC # BLD AUTO: 6.2 K/UL (ref 4.4–11.3)

## 2021-08-25 PROCEDURE — 85610 PROTHROMBIN TIME: CPT

## 2021-08-25 PROCEDURE — 74011250636 HC RX REV CODE- 250/636: Performed by: EMERGENCY MEDICINE

## 2021-08-25 PROCEDURE — 99285 EMERGENCY DEPT VISIT HI MDM: CPT

## 2021-08-25 PROCEDURE — 36415 COLL VENOUS BLD VENIPUNCTURE: CPT

## 2021-08-25 PROCEDURE — 80053 COMPREHEN METABOLIC PANEL: CPT

## 2021-08-25 PROCEDURE — 71045 X-RAY EXAM CHEST 1 VIEW: CPT

## 2021-08-25 PROCEDURE — 85025 COMPLETE CBC W/AUTO DIFF WBC: CPT

## 2021-08-25 PROCEDURE — 70450 CT HEAD/BRAIN W/O DYE: CPT

## 2021-08-25 PROCEDURE — 84484 ASSAY OF TROPONIN QUANT: CPT

## 2021-08-25 PROCEDURE — 83735 ASSAY OF MAGNESIUM: CPT

## 2021-08-25 PROCEDURE — 93005 ELECTROCARDIOGRAM TRACING: CPT

## 2021-08-25 PROCEDURE — 81003 URINALYSIS AUTO W/O SCOPE: CPT

## 2021-08-25 RX ORDER — MECLIZINE HCL 12.5 MG 12.5 MG/1
50 TABLET ORAL ONCE
Status: COMPLETED | OUTPATIENT
Start: 2021-08-25 | End: 2021-08-25

## 2021-08-25 RX ORDER — SODIUM CHLORIDE 0.9 % (FLUSH) 0.9 %
5-40 SYRINGE (ML) INJECTION AS NEEDED
Status: DISCONTINUED | OUTPATIENT
Start: 2021-08-25 | End: 2021-08-25 | Stop reason: HOSPADM

## 2021-08-25 RX ORDER — MECLIZINE HCL 12.5 MG 12.5 MG/1
50 TABLET ORAL ONCE
Status: DISCONTINUED | OUTPATIENT
Start: 2021-08-25 | End: 2021-08-25 | Stop reason: SDUPTHER

## 2021-08-25 RX ORDER — MECLIZINE HCL 12.5 MG 12.5 MG/1
50 TABLET ORAL DAILY
Status: DISCONTINUED | OUTPATIENT
Start: 2021-08-26 | End: 2021-08-25

## 2021-08-25 RX ADMIN — MECLIZINE 50 MG: 12.5 TABLET ORAL at 17:22

## 2021-08-25 NOTE — ED PROVIDER NOTES
EMERGENCY DEPARTMENT HISTORY AND PHYSICAL EXAM      Date: 8/25/2021  Patient Name: Whit Rodriguez    History of Presenting Illness     Chief Complaint   Patient presents with   Phoebe Conine Fall    Dizziness       History Provided By: Patient and Patient's Wife    HPI: Whit Rodriguez, 80 y.o. male with a past medical history significant hypertension and stroke presents to the ED with cc of 4 days patient fell twice injuring his left temporal and back of head with complaints of dizziness at the time today patient was home and fell backwards onto his bed complaining that he was dizzy; history of CVA patient on blood thinners with residual effects from the CVA of left lower extremity weakness patient normally ambulates with a walker, patient also takes medication for vertigo    There are no other complaints, changes, or physical findings at this time. PCP: aDniela Cortez MD    No current facility-administered medications on file prior to encounter. Current Outpatient Medications on File Prior to Encounter   Medication Sig Dispense Refill    atorvastatin (LIPITOR) 40 mg tablet Take  by mouth daily.  lisinopriL (PRINIVIL, ZESTRIL) 40 mg tablet Take 40 mg by mouth daily.  amLODIPine (NORVASC) 5 mg tablet Take 5 mg by mouth daily.  allopurinoL (ZYLOPRIM) 300 mg tablet Take 300 mg by mouth daily.  metoprolol tartrate (LOPRESSOR) 50 mg tablet Take  by mouth two (2) times a day.  metFORMIN (GLUCOPHAGE) 500 mg tablet Take  by mouth two (2) times daily (with meals).  clopidogreL (PLAVIX) 75 mg tab Take 75 mg by mouth.  aspirin delayed-release 81 mg tablet Take  by mouth daily.  acetaminophen (TYLENOL) 325 mg tablet Take 2 Tabs by mouth every four (4) hours as needed for Pain.  20 Tab 0       Past History     Past Medical History:  Past Medical History:   Diagnosis Date    CVA (cerebral vascular accident) ([facility] [address] 11/2019    Ear problems 7/22/2020    Hearing loss 7/22/2020    HTN (hypertension)     Kidney stones     Prostate cancer ([facility] [address]     Skin cancer        Past Surgical History:  No past surgical history on file. Family History:  No family history on file. Social History:  Social History     Tobacco Use    Smoking status: Never Smoker    Smokeless tobacco: Never Used   Substance Use Topics    Alcohol use: Not Currently    Drug use: Never       Allergies:  No Known Allergies      Review of Systems     Review of Systems   Constitutional: Negative for chills and fever. HENT: Negative for rhinorrhea and sore throat. Eyes: Negative for pain and visual disturbance. Respiratory: Negative for cough and shortness of breath. Cardiovascular: Negative for chest pain and leg swelling. Gastrointestinal: Negative for abdominal pain and vomiting. Genitourinary: Negative for dysuria and urgency. Musculoskeletal: Negative for back pain and neck pain. Skin: Negative for color change and pallor. Neurological: Positive for dizziness. Negative for seizures, syncope, facial asymmetry, speech difficulty, weakness, light-headedness, numbness and headaches. Psychiatric/Behavioral: Negative. Physical Exam     Physical Exam  Vitals and nursing note reviewed. Constitutional:       General: He is not in acute distress. Appearance: Normal appearance. He is not ill-appearing, toxic-appearing or diaphoretic. HENT:      Head: Normocephalic and atraumatic. Right Ear: Tympanic membrane and ear canal normal.      Left Ear: Ear canal normal.      Nose: Nose normal.      Mouth/Throat:      Mouth: Mucous membranes are moist.      Pharynx: Oropharynx is clear. Eyes:      Extraocular Movements: Extraocular movements intact. Conjunctiva/sclera: Conjunctivae normal.      Pupils: Pupils are equal, round, and reactive to light. Cardiovascular:      Rate and Rhythm: Normal rate and regular rhythm. Pulses: Normal pulses.    Pulmonary:      Effort: Pulmonary effort is normal.      Breath sounds: Normal breath sounds. Abdominal:      General: Bowel sounds are normal.      Palpations: Abdomen is soft. Musculoskeletal:         General: No deformity or signs of injury. Normal range of motion. Cervical back: Normal range of motion. Rigidity and tenderness present. Skin:     General: Skin is warm and dry. Capillary Refill: Capillary refill takes less than 2 seconds. Neurological:      General: No focal deficit present. Mental Status: He is oriented to person, place, and time. Cranial Nerves: No cranial nerve deficit. Sensory: No sensory deficit. Motor: No weakness. Coordination: Coordination normal.   Psychiatric:         Mood and Affect: Mood normal.         Behavior: Behavior normal.         Lab and Diagnostic Study Results     Labs -     Recent Results (from the past 12 hour(s))   EKG, 12 LEAD, INITIAL    Collection Time: 08/25/21  2:21 PM   Result Value Ref Range    Ventricular Rate 65 BPM    Atrial Rate 65 BPM    P-R Interval 201 ms    QRS Duration 101 ms    Q-T Interval 364 ms    QTC Calculation (Bezet) 379 ms    Calculated P Axis 10 degrees    Calculated R Axis -15 degrees    Calculated T Axis 60 degrees    Diagnosis       Sinus rhythm  Borderline left axis deviation  Borderline T wave abnormalities         Radiologic Studies -   @lastxrresult@  CT Results  (Last 48 hours)    None        CXR Results  (Last 48 hours)    None            Medical Decision Making   - I am the first provider for this patient. - I reviewed the vital signs, available nursing notes, past medical history, past surgical history, family history and social history. - Initial assessment performed. The patients presenting problems have been discussed, and they are in agreement with the care plan formulated and outlined with them. I have encouraged them to ask questions as they arise throughout their visit.     Vital Signs-Reviewed the patient's vital signs. Patient Vitals for the past 12 hrs:   Temp Pulse Resp BP SpO2   08/25/21 1415 98.1 °F (36.7 °C) 67 18 (!) 177/93 99 %       Records Reviewed: Nursing Notes    The patient presents with dizziness with a differential diagnosis of  cardiac dysrhythm, dizziness/vertigo, generalized weakness and TIA      ED Course:          Provider Notes (Medical Decision Making): MDM       Procedures   Medical Decision Makingedical Decision Making  Performed by: Pricila Goldberg MD  PROCEDURES:  Procedures       Disposition   Disposition: Condition stable and improved  DC- Adult Discharges: All of the diagnostic tests were reviewed and questions answered. Diagnosis, care plan and treatment options were discussed. The patient understands the instructions and will follow up as directed. The patients results have been reviewed with them. They have been counseled regarding their diagnosis. The patient verbally convey understanding and agreement of the signs, symptoms, diagnosis, treatment and prognosis and additionally agrees to follow up as recommended with their PCP in 24 - 48 hours. They also agree with the care-plan and convey that all of their questions have been answered. I have also put together some discharge instructions for them that include: 1) educational information regarding their diagnosis, 2) how to care for their diagnosis at home, as well a 3) list of reasons why they would want to return to the ED prior to their follow-up appointment, should their condition change. DISCHARGE PLAN:  1. Current Discharge Medication List      CONTINUE these medications which have NOT CHANGED    Details   atorvastatin (LIPITOR) 40 mg tablet Take  by mouth daily. lisinopriL (PRINIVIL, ZESTRIL) 40 mg tablet Take 40 mg by mouth daily. amLODIPine (NORVASC) 5 mg tablet Take 5 mg by mouth daily. allopurinoL (ZYLOPRIM) 300 mg tablet Take 300 mg by mouth daily.       metoprolol tartrate (LOPRESSOR) 50 mg tablet Take  by mouth two (2) times a day. metFORMIN (GLUCOPHAGE) 500 mg tablet Take  by mouth two (2) times daily (with meals). clopidogreL (PLAVIX) 75 mg tab Take 75 mg by mouth. aspirin delayed-release 81 mg tablet Take  by mouth daily. acetaminophen (TYLENOL) 325 mg tablet Take 2 Tabs by mouth every four (4) hours as needed for Pain. Qty: 20 Tab, Refills: 0           2. Follow-up Information    None       3. Return to ED if worse   4. Current Discharge Medication List            Diagnosis     Clinical Impression: No diagnosis found. Attestations:    Mattie Love MD    Please note that this dictation was completed with [employer], the computer voice recognition software. Quite often unanticipated grammatical, syntax, homophones, and other interpretive errors are inadvertently transcribed by the computer software. Please disregard these errors. Please excuse any errors that have escaped final proofreading. Thank you.

## 2021-08-25 NOTE — ED TRIAGE NOTES
Caregiver states that patient fell twice on Saturday & hit his head once. States that patient does take blood thinners. States that patient has been having dizzy spells.   Patient denies dizziness at triage but report that he took a medication for dizziness before they came to the hospital.

## 2021-08-26 LAB
ATRIAL RATE: 65 BPM
CALCULATED P AXIS, ECG09: 10 DEGREES
CALCULATED R AXIS, ECG10: -15 DEGREES
CALCULATED T AXIS, ECG11: 60 DEGREES
DIAGNOSIS, 93000: NORMAL
P-R INTERVAL, ECG05: 201 MS
Q-T INTERVAL, ECG07: 364 MS
QRS DURATION, ECG06: 101 MS
QTC CALCULATION (BEZET), ECG08: 379 MS
VENTRICULAR RATE, ECG03: 65 BPM

## 2021-10-22 ENCOUNTER — APPOINTMENT (OUTPATIENT)
Dept: CT IMAGING | Age: 86
End: 2021-10-22
Attending: EMERGENCY MEDICINE
Payer: MEDICARE

## 2021-10-22 ENCOUNTER — HOSPITAL ENCOUNTER (EMERGENCY)
Age: 86
Discharge: HOME | End: 2021-10-22
Attending: EMERGENCY MEDICINE
Payer: MEDICARE

## 2021-10-22 VITALS
HEART RATE: 67 BPM | DIASTOLIC BLOOD PRESSURE: 97 MMHG | WEIGHT: 187 LBS | OXYGEN SATURATION: 99 % | BODY MASS INDEX: 28.34 KG/M2 | TEMPERATURE: 97.5 F | SYSTOLIC BLOOD PRESSURE: 163 MMHG | HEIGHT: 68 IN | RESPIRATION RATE: 18 BRPM

## 2021-10-22 DIAGNOSIS — I10 BENIGN ESSENTIAL HTN: ICD-10-CM

## 2021-10-22 DIAGNOSIS — I10 ESSENTIAL HYPERTENSION: Primary | ICD-10-CM

## 2021-10-22 LAB
ALBUMIN SERPL-MCNC: 3.7 G/DL (ref 3.5–5)
ALBUMIN/GLOB SERPL: 1 {RATIO} (ref 1.1–2.2)
ALP SERPL-CCNC: 103 U/L (ref 45–117)
ALT SERPL-CCNC: 29 U/L (ref 12–78)
ANION GAP SERPL CALC-SCNC: 10 MMOL/L (ref 5–15)
AST SERPL W P-5'-P-CCNC: 24 U/L (ref 15–37)
BASOPHILS # BLD: 0.1 K/UL (ref 0–0.2)
BASOPHILS NFR BLD: 1 % (ref 0–2.5)
BILIRUB SERPL-MCNC: 0.4 MG/DL (ref 0.2–1)
BUN SERPL-MCNC: 24 MG/DL (ref 6–20)
BUN/CREAT SERPL: 18 (ref 12–20)
CA-I BLD-MCNC: 10.6 MG/DL (ref 8.5–10.1)
CHLORIDE SERPL-SCNC: 110 MMOL/L (ref 97–108)
CO2 SERPL-SCNC: 25 MMOL/L (ref 21–32)
CREAT SERPL-MCNC: 1.33 MG/DL (ref 0.7–1.3)
EOSINOPHIL # BLD: 0.7 K/UL (ref 0–0.7)
EOSINOPHIL NFR BLD: 12 % (ref 0.9–2.9)
ERYTHROCYTE [DISTWIDTH] IN BLOOD BY AUTOMATED COUNT: 17.5 % (ref 11.5–14.5)
GLOBULIN SER CALC-MCNC: 3.8 G/DL (ref 2–4)
GLUCOSE SERPL-MCNC: 104 MG/DL (ref 65–100)
HCT VFR BLD AUTO: 37.5 % (ref 41–53)
HGB BLD-MCNC: 12.1 G/DL (ref 13.5–17.5)
INR PPP: 1 (ref 0.9–1.1)
LYMPHOCYTES # BLD: 1.8 K/UL (ref 1–4.8)
LYMPHOCYTES NFR BLD: 28 % (ref 20.5–51.1)
MCH RBC QN AUTO: 28.7 PG (ref 31–34)
MCHC RBC AUTO-ENTMCNC: 32.4 G/DL (ref 31–36)
MCV RBC AUTO: 88.5 FL (ref 80–100)
MONOCYTES # BLD: 0.6 K/UL (ref 0.2–2.4)
MONOCYTES NFR BLD: 10 % (ref 1.7–9.3)
NEUTS SEG # BLD: 3.1 K/UL (ref 1.8–7.7)
NEUTS SEG NFR BLD: 49 % (ref 42–75)
NRBC # BLD: 0.01 K/UL
NRBC BLD-RTO: 0.2 PER 100 WBC
PLATELET # BLD AUTO: 145 K/UL (ref 150–400)
PMV BLD AUTO: 8.3 FL (ref 6.5–11.5)
POTASSIUM SERPL-SCNC: 4.6 MMOL/L (ref 3.5–5.1)
PROT SERPL-MCNC: 7.5 G/DL (ref 6.4–8.2)
PROTHROMBIN TIME: 10.1 SEC (ref 9–11.1)
RBC # BLD AUTO: 4.24 M/UL (ref 4.5–5.9)
SODIUM SERPL-SCNC: 145 MMOL/L (ref 136–145)
TROPONIN-HIGH SENSITIVITY: 13 NG/L (ref 0–76)
WBC # BLD AUTO: 6.3 K/UL (ref 4.4–11.3)

## 2021-10-22 PROCEDURE — 99284 EMERGENCY DEPT VISIT MOD MDM: CPT

## 2021-10-22 PROCEDURE — 80053 COMPREHEN METABOLIC PANEL: CPT

## 2021-10-22 PROCEDURE — 36415 COLL VENOUS BLD VENIPUNCTURE: CPT

## 2021-10-22 PROCEDURE — 85610 PROTHROMBIN TIME: CPT

## 2021-10-22 PROCEDURE — 93005 ELECTROCARDIOGRAM TRACING: CPT

## 2021-10-22 PROCEDURE — 84484 ASSAY OF TROPONIN QUANT: CPT

## 2021-10-22 PROCEDURE — 85025 COMPLETE CBC W/AUTO DIFF WBC: CPT

## 2021-10-22 PROCEDURE — 70450 CT HEAD/BRAIN W/O DYE: CPT

## 2021-10-22 RX ORDER — CLONIDINE HYDROCHLORIDE 0.1 MG/1
0.2 TABLET ORAL
Status: DISCONTINUED | OUTPATIENT
Start: 2021-10-22 | End: 2021-10-22

## 2021-10-22 NOTE — ED NOTES
Pt given discharge and follow up instructions. Pt advised to follow up with PCP.  Advised to continue meds as prescribed by Dr. Mallie Peabody.

## 2021-10-22 NOTE — ED PROVIDER NOTES
EMERGENCY DEPARTMENT HISTORY AND PHYSICAL EXAM      Date: 10/22/2021  Patient Name: Denis Rodriguez    History of Presenting Illness     Chief Complaint   Patient presents with    Hypertension       History Provided By: Patient's Wife    HPI: Denis Rodriguez, 80 y.o. male with a past medical history significant hypertension nnd CVA presents to the ED with cc of elevated HTN and dizziness and headache since this morning. Patient is under a lot of stress. No chest pain or SoB, fever or chills. Patient took his blood pressure medication today. There are no other complaints, changes, or physical findings at this time. PCP: Ada Ellis MD    No current facility-administered medications on file prior to encounter. Current Outpatient Medications on File Prior to Encounter   Medication Sig Dispense Refill    atorvastatin (LIPITOR) 40 mg tablet Take  by mouth daily.  lisinopriL (PRINIVIL, ZESTRIL) 40 mg tablet Take 40 mg by mouth daily.  amLODIPine (NORVASC) 5 mg tablet Take 5 mg by mouth daily.  allopurinoL (ZYLOPRIM) 300 mg tablet Take 300 mg by mouth daily.  metoprolol tartrate (LOPRESSOR) 50 mg tablet Take  by mouth two (2) times a day.  metFORMIN (GLUCOPHAGE) 500 mg tablet Take  by mouth two (2) times daily (with meals).  clopidogreL (PLAVIX) 75 mg tab Take 75 mg by mouth.  aspirin delayed-release 81 mg tablet Take  by mouth daily.  acetaminophen (TYLENOL) 325 mg tablet Take 2 Tabs by mouth every four (4) hours as needed for Pain. 20 Tab 0       Past History     Past Medical History:  Past Medical History:   Diagnosis Date    CVA (cerebral vascular accident) ([facility] [address] 11/2019    Ear problems 7/22/2020    Hearing loss 7/22/2020    HTN (hypertension)     Kidney stones     Prostate cancer ([facility] [address]     Skin cancer        Past Surgical History:  No past surgical history on file. Family History:  No family history on file.     Social History:  Social History     Tobacco Use    Smoking status: Never Smoker    Smokeless tobacco: Never Used   Substance Use Topics    Alcohol use: Not Currently    Drug use: Never       Allergies:  No Known Allergies      Review of Systems     Review of Systems   Constitutional: Negative. HENT: Negative. Eyes: Negative. Respiratory: Negative. Cardiovascular: Negative. Gastrointestinal: Negative. Endocrine: Negative. Genitourinary: Negative. Musculoskeletal: Negative. Skin: Negative. Allergic/Immunologic: Negative. Neurological: Positive for dizziness and headaches. Hematological: Negative. Psychiatric/Behavioral: Negative. All other systems reviewed and are negative. Physical Exam     Physical Exam  Vitals and nursing note reviewed. Constitutional:       Appearance: Normal appearance. HENT:      Head: Normocephalic. Ears:      Comments: Hard of Hearing     Nose: Nose normal.      Mouth/Throat:      Mouth: Mucous membranes are moist.   Eyes:      Pupils: Pupils are equal, round, and reactive to light. Cardiovascular:      Rate and Rhythm: Normal rate. Pulmonary:      Effort: Pulmonary effort is normal.   Abdominal:      General: Abdomen is flat. Musculoskeletal:         General: No tenderness. Normal range of motion. Cervical back: Normal range of motion. Skin:     General: Skin is warm. Neurological:      General: No focal deficit present. Mental Status: He is alert and oriented to person, place, and time.    Psychiatric:         Mood and Affect: Mood normal.         Lab and Diagnostic Study Results     Labs -     Recent Results (from the past 8 hour(s))   EKG, 12 LEAD, INITIAL    Collection Time: 10/22/21  3:47 PM   Result Value Ref Range    Ventricular Rate 65 BPM    Atrial Rate 66 BPM    P-R Interval 205 ms    QRS Duration 100 ms    Q-T Interval 349 ms    QTC Calculation (Bezet) 363 ms    Calculated P Axis 22 degrees    Calculated R Axis 11 degrees    Calculated T Axis 26 degrees    Diagnosis       Sinus rhythm  Abnormal R-wave progression, early transition  Abnrm T, consider ischemia, anterolateral lds     CBC WITH AUTOMATED DIFF    Collection Time: 10/22/21  4:00 PM   Result Value Ref Range    WBC 6.3 4.4 - 11.3 K/uL    RBC 4.24 (L) 4.50 - 5.90 M/uL    HGB 12.1 (L) 13.5 - 17.5 g/dL    HCT 37.5 (L) 41 - 53 %    MCV 88.5 80 - 100 FL    MCH 28.7 (L) 31 - 34 PG    MCHC 32.4 31.0 - 36.0 g/dL    RDW 17.5 (H) 11.5 - 14.5 %    PLATELET 885 (L) 566 - 400 K/uL    MPV 8.3 6.5 - 11.5 FL    NRBC 0.2  WBC    ABSOLUTE NRBC 0.01 K/uL    NEUTROPHILS 49 42 - 75 %    LYMPHOCYTES 28 20.5 - 51.1 %    MONOCYTES 10 (H) 1.7 - 9.3 %    EOSINOPHILS 12 (H) 0.9 - 2.9 %    BASOPHILS 1 0.0 - 2.5 %    ABS. NEUTROPHILS 3.1 1.8 - 7.7 K/UL    ABS. LYMPHOCYTES 1.8 1.0 - 4.8 K/UL    ABS. MONOCYTES 0.6 0.2 - 2.4 K/UL    ABS. EOSINOPHILS 0.7 0.0 - 0.7 K/UL    ABS. BASOPHILS 0.1 0.0 - 0.2 K/UL   METABOLIC PANEL, COMPREHENSIVE    Collection Time: 10/22/21  4:00 PM   Result Value Ref Range    Sodium 145 136 - 145 mmol/L    Potassium 4.6 3.5 - 5.1 mmol/L    Chloride 110 (H) 97 - 108 mmol/L    CO2 25 21 - 32 mmol/L    Anion gap 10 5 - 15 mmol/L    Glucose 104 (H) 65 - 100 mg/dL    BUN 24 (H) 6 - 20 mg/dL    Creatinine 1.33 (H) 0.70 - 1.30 mg/dL    BUN/Creatinine ratio 18 12 - 20      GFR est AA >60 >60 ml/min/1.73m2    GFR est non-AA 51 (L) >60 ml/min/1.73m2    Calcium 10.6 (H) 8.5 - 10.1 mg/dL    Bilirubin, total 0.4 0.2 - 1.0 mg/dL    AST (SGOT) 24 15 - 37 U/L    ALT (SGPT) 29 12 - 78 U/L    Alk.  phosphatase 103 45 - 117 U/L    Protein, total 7.5 6.4 - 8.2 g/dL    Albumin 3.7 3.5 - 5.0 g/dL    Globulin 3.8 2.0 - 4.0 g/dL    A-G Ratio 1.0 (L) 1.1 - 2.2     TROPONIN-HIGH SENSITIVITY    Collection Time: 10/22/21  4:00 PM   Result Value Ref Range    Troponin-High Sensitivity 13 0 - 76 ng/L   PROTHROMBIN TIME + INR    Collection Time: 10/22/21  4:00 PM   Result Value Ref Range    Prothrombin time 10.1 9.0 - 11.1 sec INR 1.0 0.9 - 1.1         ICD-10-CM ICD-9-CM    1. Essential hypertension  I10 401.9      Recent Results (from the past 12 hour(s))   EKG, 12 LEAD, INITIAL    Collection Time: 10/22/21  3:47 PM   Result Value Ref Range    Ventricular Rate 65 BPM    Atrial Rate 66 BPM    P-R Interval 205 ms    QRS Duration 100 ms    Q-T Interval 349 ms    QTC Calculation (Bezet) 363 ms    Calculated P Axis 22 degrees    Calculated R Axis 11 degrees    Calculated T Axis 26 degrees    Diagnosis       Sinus rhythm  Abnormal R-wave progression, early transition  Abnrm T, consider ischemia, anterolateral lds         Radiologic Studies -   @lastxrresult@  CT Results  (Last 48 hours)    None        CT Results  (Last 48 hours)               10/22/21 1609  CT HEAD WO CONT Final result    Impression:  Evidence for advanced nonacute end vessel occlusive change. Central cerebral arteriosclerosis. No intracranial hemorrhage. Narrative:  CT head. Axial images are reviewed along with reformatted sagittal/coronal images. Dose reduction: All CT scans at this facility are performed using dose reduction   optimization techniques as appropriate to a performed exam including the   following-   automated exposure control, adjustments of mA and/or Kv according to patient   size, or use of iterative reconstructive technique. Bone windows demonstrate normal aeration of the imaged sinuses and mastoid air   cells. Prior ophthalmologic surgery. Review of intracranial content reveals ventricular and sulcal prominence related   to cerebral atrophy. Extensive patchy low density through   periventricular/subcortical white matter suggests gliosis related to nonacute   end vessel occlusive change. Central cerebral arteriosclerosis. No   hydrocephalus. No intracranial hemorrhage. CXR Results  (Last 48 hours)    None            Medical Decision Making   - I am the first provider for this patient.     - I reviewed the vital signs, available nursing notes, past medical history, past surgical history, family history and social history. - Initial assessment performed. The patients presenting problems have been discussed, and they are in agreement with the care plan formulated and outlined with them. I have encouraged them to ask questions as they arise throughout their visit. Vital Signs-Reviewed the patient's vital signs. Patient Vitals for the past 12 hrs:   Temp Pulse Resp BP SpO2   10/22/21 1539 97.5 °F (36.4 °C) 65 18 (!) 198/108 99 %       Records Reviewed: Nursing Notes    The patient presents with headache with a differential diagnosis of  cerebral hemorrhage, cluster headache, CVA, ICH, migraine, sinusitis, tension headache, toxic, vascular headache and elevated blood Pressure. ED Course:          Provider Notes (Medical Decision Making): MDM       Procedures   Medical Decision Makingedical Decision Making  Performed by: Ashley Fontaine MD  PROCEDURES:Procedures       Disposition   Disposition: Condition stable  DC- Adult Discharges: All of the diagnostic tests were reviewed and questions answered. Diagnosis, care plan and treatment options were discussed. The patient understands the instructions and will follow up as directed. The patients results have been reviewed with them. They have been counseled regarding their diagnosis. The patient verbally convey understanding and agreement of the signs, symptoms, diagnosis, treatment and prognosis and additionally agrees to follow up as recommended with their PCP in 24 - 48 hours. They also agree with the care-plan and convey that all of their questions have been answered.   I have also put together some discharge instructions for them that include: 1) educational information regarding their diagnosis, 2) how to care for their diagnosis at home, as well a 3) list of reasons why they would want to return to the ED prior to their follow-up appointment, should their condition change. DISCHARGE PLAN:  1. Current Discharge Medication List      CONTINUE these medications which have NOT CHANGED    Details   atorvastatin (LIPITOR) 40 mg tablet Take  by mouth daily. lisinopriL (PRINIVIL, ZESTRIL) 40 mg tablet Take 40 mg by mouth daily. amLODIPine (NORVASC) 5 mg tablet Take 5 mg by mouth daily. allopurinoL (ZYLOPRIM) 300 mg tablet Take 300 mg by mouth daily. metoprolol tartrate (LOPRESSOR) 50 mg tablet Take  by mouth two (2) times a day. metFORMIN (GLUCOPHAGE) 500 mg tablet Take  by mouth two (2) times daily (with meals). clopidogreL (PLAVIX) 75 mg tab Take 75 mg by mouth. aspirin delayed-release 81 mg tablet Take  by mouth daily. acetaminophen (TYLENOL) 325 mg tablet Take 2 Tabs by mouth every four (4) hours as needed for Pain. Qty: 20 Tab, Refills: 0           2. Follow-up Information    None       3. Return to ED if worse   4. Current Discharge Medication List            Diagnosis     Clinical Impression:     ICD-10-CM ICD-9-CM    1. Essential hypertension  I10 401.9      Christina Ho MD    Please note that this dictation was completed with [employer], the computer voice recognition software. Quite often unanticipated grammatical, syntax, homophones, and other interpretive errors are inadvertently transcribed by the computer software. Please disregard these errors. Please excuse any errors that have escaped final proofreading. Thank you.

## 2021-10-22 NOTE — ED TRIAGE NOTES
Pt reports elevated BP at home--186/98--reports dizziness. Pt denies any other s/s     PMHX CVA with right side weakness--Pt is on Plavix.

## 2021-10-22 NOTE — ED NOTES
Pt came in with complaints of high blood pressure, BP ran 186/98 at home.  Initial BP on arrival 198/108, IV put in place pt tolerated well, BP came down to 163/97, BP medications held per MD order, will continue to monitor

## 2021-10-25 LAB
ATRIAL RATE: 66 BPM
CALCULATED P AXIS, ECG09: 22 DEGREES
CALCULATED R AXIS, ECG10: 11 DEGREES
CALCULATED T AXIS, ECG11: 26 DEGREES
DIAGNOSIS, 93000: NORMAL
P-R INTERVAL, ECG05: 205 MS
Q-T INTERVAL, ECG07: 349 MS
QRS DURATION, ECG06: 100 MS
QTC CALCULATION (BEZET), ECG08: 363 MS
VENTRICULAR RATE, ECG03: 65 BPM

## 2022-03-18 PROBLEM — H73.11 CHRONIC MYRINGITIS OF RIGHT EAR: Status: ACTIVE | Noted: 2020-09-22

## 2022-03-18 PROBLEM — H60.399 ACUTE BACTERIAL OTITIS EXTERNA: Status: ACTIVE | Noted: 2020-08-25

## 2022-03-19 PROBLEM — H90.6 MIXED HEARING LOSS, BILATERAL: Status: ACTIVE | Noted: 2020-08-25

## 2022-03-19 PROBLEM — H91.90 HEARING LOSS: Status: ACTIVE | Noted: 2020-07-22

## 2022-03-19 PROBLEM — H93.90 EAR PROBLEMS: Status: ACTIVE | Noted: 2020-07-22

## 2022-03-20 PROBLEM — H60.90 OTITIS EXTERNA: Status: ACTIVE | Noted: 2020-08-25

## 2022-07-03 ENCOUNTER — APPOINTMENT (OUTPATIENT)
Dept: CT IMAGING | Age: 87
DRG: 305 | End: 2022-07-03
Attending: EMERGENCY MEDICINE
Payer: MEDICARE

## 2022-07-03 ENCOUNTER — HOSPITAL ENCOUNTER (INPATIENT)
Age: 87
LOS: 1 days | Discharge: HOME HEALTH | DRG: 305 | End: 2022-07-05
Attending: EMERGENCY MEDICINE | Admitting: HOSPITALIST
Payer: MEDICARE

## 2022-07-03 DIAGNOSIS — R42 DIZZINESS: Primary | ICD-10-CM

## 2022-07-03 DIAGNOSIS — R00.1 BRADYCARDIA: ICD-10-CM

## 2022-07-03 DIAGNOSIS — I10 PRIMARY HYPERTENSION: ICD-10-CM

## 2022-07-03 LAB
ALBUMIN SERPL-MCNC: 3.5 G/DL (ref 3.5–5)
ALBUMIN/GLOB SERPL: 1 {RATIO} (ref 1.1–2.2)
ALP SERPL-CCNC: 108 U/L (ref 45–117)
ALT SERPL-CCNC: 30 U/L (ref 12–78)
ANION GAP SERPL CALC-SCNC: 8 MMOL/L (ref 5–15)
APPEARANCE UR: CLEAR
APTT PPP: 27.8 SEC (ref 21.2–34.1)
AST SERPL W P-5'-P-CCNC: 24 U/L (ref 15–37)
BACTERIA URNS QL MICRO: ABNORMAL /HPF
BASOPHILS # BLD: 0 K/UL (ref 0–0.2)
BASOPHILS NFR BLD: 1 % (ref 0–2.5)
BILIRUB SERPL-MCNC: 0.5 MG/DL (ref 0.2–1)
BILIRUB UR QL: NEGATIVE
BUN SERPL-MCNC: 16 MG/DL (ref 6–20)
BUN/CREAT SERPL: 14 (ref 12–20)
CA-I BLD-MCNC: 10.6 MG/DL (ref 8.5–10.1)
CHLORIDE SERPL-SCNC: 109 MMOL/L (ref 97–108)
CO2 SERPL-SCNC: 24 MMOL/L (ref 21–32)
COLOR UR: ABNORMAL
CREAT SERPL-MCNC: 1.16 MG/DL (ref 0.7–1.3)
EOSINOPHIL # BLD: 0.2 K/UL (ref 0–0.7)
EOSINOPHIL NFR BLD: 3 % (ref 0.9–2.9)
ERYTHROCYTE [DISTWIDTH] IN BLOOD BY AUTOMATED COUNT: 16.2 % (ref 11.5–14.5)
GLOBULIN SER CALC-MCNC: 3.5 G/DL (ref 2–4)
GLUCOSE SERPL-MCNC: 162 MG/DL (ref 65–100)
GLUCOSE UR STRIP.AUTO-MCNC: NEGATIVE MG/DL
HCT VFR BLD AUTO: 38.9 % (ref 41–53)
HGB BLD-MCNC: 12.4 G/DL (ref 13.5–17.5)
HGB UR QL STRIP: ABNORMAL
INR PPP: 1.1 (ref 0.9–1.1)
KETONES UR QL STRIP.AUTO: NEGATIVE MG/DL
LEUKOCYTE ESTERASE UR QL STRIP.AUTO: NEGATIVE
LYMPHOCYTES # BLD: 1 K/UL (ref 1–4.8)
LYMPHOCYTES NFR BLD: 15 % (ref 20.5–51.1)
MAGNESIUM SERPL-MCNC: 1.7 MG/DL (ref 1.6–2.4)
MCH RBC QN AUTO: 27.2 PG (ref 31–34)
MCHC RBC AUTO-ENTMCNC: 31.9 G/DL (ref 31–36)
MCV RBC AUTO: 85.2 FL (ref 80–100)
MONOCYTES # BLD: 0.3 K/UL (ref 0.2–2.4)
MONOCYTES NFR BLD: 4 % (ref 1.7–9.3)
NEUTS SEG # BLD: 5.1 K/UL (ref 1.8–7.7)
NEUTS SEG NFR BLD: 77 % (ref 42–75)
NITRITE UR QL STRIP.AUTO: NEGATIVE
NRBC # BLD: 0 K/UL
NRBC BLD-RTO: 0.1 PER 100 WBC
PH UR STRIP: 6 [PH] (ref 5–8)
PLATELET # BLD AUTO: 159 K/UL (ref 150–400)
PMV BLD AUTO: 7.8 FL (ref 6.5–11.5)
POTASSIUM SERPL-SCNC: 4.5 MMOL/L (ref 3.5–5.1)
PROT SERPL-MCNC: 7 G/DL (ref 6.4–8.2)
PROT UR STRIP-MCNC: 100 MG/DL
PROTHROMBIN TIME: 13.7 SEC (ref 11.9–14.6)
RBC # BLD AUTO: 4.57 M/UL (ref 4.5–5.9)
RBC #/AREA URNS HPF: ABNORMAL /HPF (ref 0–3)
SODIUM SERPL-SCNC: 141 MMOL/L (ref 136–145)
SP GR UR REFRACTOMETRY: 1.02 (ref 1–1.03)
THERAPEUTIC RANGE,PTTT: NORMAL SEC (ref 82–109)
TROPONIN-HIGH SENSITIVITY: 15 NG/L (ref 0–76)
UROBILINOGEN UR QL STRIP.AUTO: 0.2 EU/DL (ref 0.2–1)
WBC # BLD AUTO: 6.7 K/UL (ref 4.4–11.3)
WBC URNS QL MICRO: ABNORMAL /HPF (ref 0–5)

## 2022-07-03 PROCEDURE — 84484 ASSAY OF TROPONIN QUANT: CPT

## 2022-07-03 PROCEDURE — 93005 ELECTROCARDIOGRAM TRACING: CPT

## 2022-07-03 PROCEDURE — 83735 ASSAY OF MAGNESIUM: CPT

## 2022-07-03 PROCEDURE — 96376 TX/PRO/DX INJ SAME DRUG ADON: CPT

## 2022-07-03 PROCEDURE — 85730 THROMBOPLASTIN TIME PARTIAL: CPT

## 2022-07-03 PROCEDURE — 96374 THER/PROPH/DIAG INJ IV PUSH: CPT

## 2022-07-03 PROCEDURE — 80053 COMPREHEN METABOLIC PANEL: CPT

## 2022-07-03 PROCEDURE — 70450 CT HEAD/BRAIN W/O DYE: CPT

## 2022-07-03 PROCEDURE — 74011250636 HC RX REV CODE- 250/636: Performed by: EMERGENCY MEDICINE

## 2022-07-03 PROCEDURE — 99285 EMERGENCY DEPT VISIT HI MDM: CPT

## 2022-07-03 PROCEDURE — 96375 TX/PRO/DX INJ NEW DRUG ADDON: CPT

## 2022-07-03 PROCEDURE — 81001 URINALYSIS AUTO W/SCOPE: CPT

## 2022-07-03 PROCEDURE — 74011250637 HC RX REV CODE- 250/637: Performed by: EMERGENCY MEDICINE

## 2022-07-03 PROCEDURE — G0378 HOSPITAL OBSERVATION PER HR: HCPCS

## 2022-07-03 PROCEDURE — 85025 COMPLETE CBC W/AUTO DIFF WBC: CPT

## 2022-07-03 PROCEDURE — 85610 PROTHROMBIN TIME: CPT

## 2022-07-03 PROCEDURE — 74011000250 HC RX REV CODE- 250: Performed by: HOSPITALIST

## 2022-07-03 PROCEDURE — 36415 COLL VENOUS BLD VENIPUNCTURE: CPT

## 2022-07-03 RX ORDER — POLYETHYLENE GLYCOL 3350 17 G/17G
17 POWDER, FOR SOLUTION ORAL DAILY PRN
Status: DISCONTINUED | OUTPATIENT
Start: 2022-07-03 | End: 2022-07-05 | Stop reason: HOSPADM

## 2022-07-03 RX ORDER — NITROGLYCERIN 20 MG/100ML
0-20 INJECTION INTRAVENOUS
Status: DISCONTINUED | OUTPATIENT
Start: 2022-07-03 | End: 2022-07-03

## 2022-07-03 RX ORDER — HYDRALAZINE HYDROCHLORIDE 20 MG/ML
20 INJECTION INTRAMUSCULAR; INTRAVENOUS ONCE
Status: COMPLETED | OUTPATIENT
Start: 2022-07-03 | End: 2022-07-03

## 2022-07-03 RX ORDER — NIFEDIPINE 30 MG/1
30 TABLET, EXTENDED RELEASE ORAL
Status: COMPLETED | OUTPATIENT
Start: 2022-07-03 | End: 2022-07-03

## 2022-07-03 RX ORDER — ONDANSETRON 4 MG/1
4 TABLET, ORALLY DISINTEGRATING ORAL
Status: DISCONTINUED | OUTPATIENT
Start: 2022-07-03 | End: 2022-07-05 | Stop reason: HOSPADM

## 2022-07-03 RX ORDER — LABETALOL HCL 20 MG/4 ML
10 SYRINGE (ML) INTRAVENOUS
Status: COMPLETED | OUTPATIENT
Start: 2022-07-03 | End: 2022-07-03

## 2022-07-03 RX ORDER — SODIUM CHLORIDE 0.9 % (FLUSH) 0.9 %
5-40 SYRINGE (ML) INJECTION EVERY 8 HOURS
Status: DISCONTINUED | OUTPATIENT
Start: 2022-07-03 | End: 2022-07-05 | Stop reason: HOSPADM

## 2022-07-03 RX ORDER — HYDRALAZINE HYDROCHLORIDE 20 MG/ML
30 INJECTION INTRAMUSCULAR; INTRAVENOUS ONCE
Status: DISCONTINUED | OUTPATIENT
Start: 2022-07-03 | End: 2022-07-03

## 2022-07-03 RX ORDER — ACETAMINOPHEN 325 MG/1
650 TABLET ORAL
Status: DISCONTINUED | OUTPATIENT
Start: 2022-07-03 | End: 2022-07-05 | Stop reason: HOSPADM

## 2022-07-03 RX ORDER — ENOXAPARIN SODIUM 100 MG/ML
40 INJECTION SUBCUTANEOUS DAILY
Status: DISCONTINUED | OUTPATIENT
Start: 2022-07-04 | End: 2022-07-05 | Stop reason: HOSPADM

## 2022-07-03 RX ORDER — HYDRALAZINE HYDROCHLORIDE 20 MG/ML
10 INJECTION INTRAMUSCULAR; INTRAVENOUS
Status: DISCONTINUED | OUTPATIENT
Start: 2022-07-03 | End: 2022-07-05 | Stop reason: HOSPADM

## 2022-07-03 RX ORDER — MECLIZINE HYDROCHLORIDE 25 MG/1
50 TABLET ORAL
Status: COMPLETED | OUTPATIENT
Start: 2022-07-03 | End: 2022-07-03

## 2022-07-03 RX ORDER — CLONIDINE HYDROCHLORIDE 0.1 MG/1
0.2 TABLET ORAL
Status: COMPLETED | OUTPATIENT
Start: 2022-07-03 | End: 2022-07-03

## 2022-07-03 RX ORDER — ONDANSETRON 2 MG/ML
4 INJECTION INTRAMUSCULAR; INTRAVENOUS
Status: DISCONTINUED | OUTPATIENT
Start: 2022-07-03 | End: 2022-07-05 | Stop reason: HOSPADM

## 2022-07-03 RX ORDER — SODIUM CHLORIDE 0.9 % (FLUSH) 0.9 %
5-40 SYRINGE (ML) INJECTION AS NEEDED
Status: DISCONTINUED | OUTPATIENT
Start: 2022-07-03 | End: 2022-07-05 | Stop reason: HOSPADM

## 2022-07-03 RX ORDER — ACETAMINOPHEN 650 MG/1
650 SUPPOSITORY RECTAL
Status: DISCONTINUED | OUTPATIENT
Start: 2022-07-03 | End: 2022-07-05 | Stop reason: HOSPADM

## 2022-07-03 RX ADMIN — HYDRALAZINE HYDROCHLORIDE 20 MG: 20 INJECTION INTRAMUSCULAR; INTRAVENOUS at 18:05

## 2022-07-03 RX ADMIN — SODIUM CHLORIDE, PRESERVATIVE FREE 10 ML: 5 INJECTION INTRAVENOUS at 21:16

## 2022-07-03 RX ADMIN — CLONIDINE HYDROCHLORIDE 0.2 MG: 0.1 TABLET ORAL at 16:34

## 2022-07-03 RX ADMIN — MECLIZINE HYDROCHLORIDE 50 MG: 25 TABLET ORAL at 13:02

## 2022-07-03 RX ADMIN — LABETALOL HYDROCHLORIDE 10 MG: 5 INJECTION, SOLUTION INTRAVENOUS at 15:22

## 2022-07-03 RX ADMIN — LABETALOL HYDROCHLORIDE 10 MG: 5 INJECTION, SOLUTION INTRAVENOUS at 13:37

## 2022-07-03 RX ADMIN — NIFEDIPINE 30 MG: 30 TABLET, EXTENDED RELEASE ORAL at 16:34

## 2022-07-03 RX ADMIN — SODIUM CHLORIDE 1000 ML: 9 INJECTION, SOLUTION INTRAVENOUS at 12:49

## 2022-07-03 NOTE — ROUTINE PROCESS
TRANSFER - OUT REPORT:    Verbal report given to [facility], RN on Cyndee Sandra  being transferred to [address] [place] [address], Room 202 for routine progression of care       Report consisted of patients Situation, Background, Assessment and   Recommendations(SBAR). Information from the following report(s) SBAR, ED Summary, [facility] and Recent Results was reviewed with the receiving nurse. Lines:   Peripheral IV 07/03/22 Right Antecubital (Active)   Site Assessment Clean, dry, & intact 07/03/22 1241   Phlebitis Assessment 0 07/03/22 1241   Infiltration Assessment 0 07/03/22 1241   Dressing Status Clean, dry, & intact 07/03/22 1241   Dressing Type Tape;Transparent 07/03/22 1241   Hub Color/Line Status Pink;Flushed;Patent 07/03/22 1241       Peripheral IV 07/03/22 Left Antecubital (Active)        Opportunity for questions and clarification was provided.       Patient transported with:   Monitor

## 2022-07-03 NOTE — DISCHARGE INSTRUCTIONS
Patient Education        Home Blood Pressure Test: About This Test  What is it? A home blood pressure test allows you to keep track of your blood pressure at home. Blood pressure is a measure of the force of blood against the walls of your arteries. Blood pressure readings include two numbers, such as 130/80 (say \"130 over 80\"). The first number is the systolic pressure. The second number is the diastolic pressure. Why is this test done? You may do this test at home to:  · Find out if you have high blood pressure. · Track your blood pressure if you have high blood pressure. · Track how well medicine is working to reduce high blood pressure. · Check how lifestyle changes, such as weight loss and exercise, are affecting blood pressure. How do you prepare for the test?  For at least 30 minutes before you take your blood pressure, don't exercise, drink caffeine, or smoke. Empty your bladder before the test. Sit quietly with your back straight and both feet on the floor for at least 5 minutes. This helps you take your blood pressure while you feel comfortable and relaxed. How is the test done? · If your doctor recommends it, take your blood pressure twice a day. Take it in the morning and evening. · Sit with your arm slightly bent and resting on a table so that your upper arm is at the same level as your heart. · Use the same arm each time you take your blood pressure. · Place the blood pressure cuff on the bare skin of your upper arm. You may have to roll up your sleeve, remove your arm from the sleeve, or take your shirt off. · Wrap the blood pressure cuff around your upper arm so that the lower edge of the cuff is about 1 inch above the bend of your elbow. · Do not move, talk, or text while you take your blood pressure. Follow the instructions that came with your blood pressure monitor. They might be different from the following. · Press the on/off button on the automatic monitor.  Then you may need to wait until the screen says the monitor is ready. · Press the start button. The cuff will inflate and deflate by itself. · Your blood pressure numbers will appear on the screen. · Wait one minute and take your blood pressure again. · If your monitor does not automatically save your numbers, write them in your log book, along with the date and time. Follow-up care is a key part of your treatment and safety. Be sure to make and go to all appointments, and call your doctor if you are having problems. It's also a good idea to keep a list of the medicines you take. Where can you learn more? Go to http://www.washington.com/  Enter C427 in the search box to learn more about \"Home Blood Pressure Test: About This Test.\"  Current as of: January 10, 2022               Content Version: 13.2  © 2006-2022 [employer] Care instructions adapted under license by [employer] (which disclaims liability or warranty for this information). If you have questions about a medical condition or this instruction, always ask your healthcare professional. [place] [address] any warranty or liability for your use of this information. Patient Education        Bradycardia: Care Instructions  Your Care Instructions     Bradycardia is a slow heart rate. A slow heart rate can be normal and healthy. Or it could be a sign of a problem with the heart's electrical system. If your heart beats too slowly, it may not supply your body with enough blood. This can make you weak or dizzy. Or it may make you pass out. Bradycardia can be caused by many things. This includes medicine, certain medical conditions, and changes in the heart that are the result of aging. How bradycardia is treated depends on what is causing it. Treatments include treating another health problem, changing a medicine, and getting a pacemaker. Treatment also depends on the symptoms.  If bradycardia doesn't cause symptoms, it may not be treated. You and your doctor can decide what treatment is right for you. Follow-up care is a key part of your treatment and safety. Be sure to make and go to all appointments, and call your doctor if you are having problems. It's also a good idea to know your test results and keep a list of the medicines you take. How can you care for yourself at home? · Take your medicines exactly as prescribed. Call your doctor if you think you are having a problem with your medicine. If your bradycardia is caused by another disease, your doctor will try to treat the disease. If it is caused by heart medicines, he or she will adjust your medicines. · Make lifestyle changes to improve your heart health. ? Eat a heart-healthy diet that includes vegetables, fruits, nuts, beans, lean meat, fish, and whole grains. Limit alcohol, sodium, and sugar. ? Get regular exercise. Try for 30 minutes on most days of the week. If you do not have other heart problems, you likely do not have limits on the type or level of activity that you can do. You may want to walk, swim, bike, or do other activities. Ask your doctor what level of exercise is safe for you.  ? Stay at a healthy weight. Lose weight if you need to.  ? Do not smoke. If you need help quitting, talk to your doctor about stop-smoking programs and medicines. These can increase your chances of quitting for good. ? Manage other health problems such as high blood pressure, high cholesterol, and diabetes. · If you get a pacemaker, you will get information about it. When should you call for help? Call 911 anytime you think you may need emergency care. For example, call if:    · You have symptoms of sudden heart failure. These may include:  ? Severe trouble breathing. ? A fast or irregular heartbeat. ? Coughing up pink, foamy mucus. ? You passed out.     · You have symptoms of a stroke.  These may include:  ? Sudden numbness, tingling, weakness, or loss of movement in your face, arm, or leg, especially on only one side of your body. ? Sudden vision changes. ? Sudden trouble speaking. ? Sudden confusion or trouble understanding simple statements. ? Sudden problems with walking or balance. ? A sudden, severe headache that is different from past headaches. Call your doctor now or seek immediate medical care if:    · You have new or changed symptoms of heart failure, such as:  ? New or increased shortness of breath. ? New or worse swelling in your legs, ankles, or feet. ? Sudden weight gain, such as more than 2 to 3 pounds in a day or 5 pounds in a week. (Your doctor may suggest a different range of weight gain.)  ? Feeling dizzy or lightheaded or like you may faint. ? Feeling so tired or weak that you cannot do your usual activities. ? Not sleeping well. Shortness of breath wakes you at night. You need extra pillows to prop yourself up to breathe easier. Watch closely for changes in your health, and be sure to contact your doctor if:    · You do not get better as expected. Where can you learn more? Go to http://www.gray.com/  Enter G806 in the search box to learn more about \"Bradycardia: Care Instructions. \"  Current as of: January 10, 2022               Content Version: 13.2  © 2425-5870 [email or web address]. Care instructions adapted under license by [employer] (which disclaims liability or warranty for this information). If you have questions about a medical condition or this instruction, always ask your healthcare professional. [place] [address] any warranty or liability for your use of this information. Patient Education        Dizziness: Care Instructions  Your Care Instructions  Dizziness is the feeling of unsteadiness or fuzziness in your head. It is different than having vertigo, which is a feeling that the room is spinning or that you are moving or falling.  It is also different from lightheadedness, which is the feeling that you are about to faint. It can be hard to know what causes dizziness. Some people feel dizzy when they have migraine headaches. Sometimes bouts of flu can make you feel dizzy. Some medical conditions, such as heart problems or high blood pressure, can make you feel dizzy. Many medicines can cause dizziness, including medicines for high blood pressure, pain, or anxiety. If a medicine causes your symptoms, your doctor may recommend that you stop or change the medicine. If it is a problem with your heart, you may need medicine to help your heart work better. If there is no clear reason for your symptoms, your doctor may suggest watching and waiting for a while to see if the dizziness goes away on its own. Follow-up care is a key part of your treatment and safety. Be sure to make and go to all appointments, and call your doctor if you are having problems. It's also a good idea to know your test results and keep a list of the medicines you take. How can you care for yourself at home? · If your doctor recommends or prescribes medicine, take it exactly as directed. Call your doctor if you think you are having a problem with your medicine. · Do not drive while you feel dizzy. · Try to prevent falls. Steps you can take include:  ? Using nonskid mats, adding grab bars near the tub, and using night-lights. ? Clearing your home so that walkways are free of anything you might trip on.  ? Letting family and friends know that you have been feeling dizzy. This will help them know how to help you. When should you call for help? Call 911 anytime you think you may need emergency care. For example, call if:    · You passed out (lost consciousness).     · You have dizziness along with symptoms of a heart attack. These may include:  ? Chest pain or pressure, or a strange feeling in the chest.  ? Sweating. ? Shortness of breath. ? Nausea or vomiting.   ? Pain, pressure, or a strange feeling in the back, neck, jaw, or upper belly or in one or both shoulders or arms. ? Lightheadedness or sudden weakness. ? A fast or irregular heartbeat.     · You have symptoms of a stroke. These may include:  ? Sudden numbness, tingling, weakness, or loss of movement in your face, arm, or leg, especially on only one side of your body. ? Sudden vision changes. ? Sudden trouble speaking. ? Sudden confusion or trouble understanding simple statements. ? Sudden problems with walking or balance. ? A sudden, severe headache that is different from past headaches. Call your doctor now or seek immediate medical care if:    · You feel dizzy and have a fever, headache, or ringing in your ears.     · You have new or increased nausea and vomiting.     · Your dizziness does not go away or comes back. Watch closely for changes in your health, and be sure to contact your doctor if:    · You do not get better as expected. Where can you learn more? Go to http://www.gray.com/  Enter Q823 in the search box to learn more about \"Dizziness: Care Instructions. \"  Current as of: July 1, 2021               Content Version: 13.2  © 8388-2484 [employer]. Care instructions adapted under license by [employer] (which disclaims liability or warranty for this information). If you have questions about a medical condition or this instruction, always ask your healthcare professional. [place] [address] any warranty or liability for your use of this information. Patient Education        Preventing Falls: Care Instructions  Your Care Instructions    Getting around your home safely can be a challenge if you have injuries or health problems that make it easy for you to fall. Loose rugs and furniture in walkways are among the dangers for many older people who have problems walking or who have poor eyesight.  People who have conditions such as arthritis, osteoporosis, or dementia also have to be careful not to fall. You can make your home safer with a few simple measures. Follow-up care is a key part of your treatment and safety. Be sure to make and go to all appointments, and call your doctor if you are having problems. It's also a good idea to know your test results and keep a list of the medicines you take. How can you care for yourself at home? Taking care of yourself  · You may get dizzy if you do not drink enough water. To prevent dehydration, drink plenty of fluids, enough so that your urine is light yellow or clear like water. Choose water and other caffeine-free clear liquids. If you have kidney, heart, or liver disease and have to limit fluids, talk with your doctor before you increase the amount of fluids you drink. · Exercise regularly to improve your strength, muscle tone, and balance. Walk if you can. Swimming may be a good choice if you cannot walk easily. · Have your vision and hearing checked each year or any time you notice a change. If you have trouble seeing and hearing, you might not be able to avoid objects and could lose your balance. · Know the side effects of the medicines you take. Ask your doctor or pharmacist whether the medicines you take can affect your balance. Sleeping pills or sedatives can affect your balance. · Limit the amount of alcohol you drink. Alcohol can impair your balance and other senses. · Ask your doctor whether calluses or corns on your feet need to be removed. If you wear loose-fitting shoes because of calluses or corns, you can lose your balance and fall. · Talk to your doctor if you have numbness in your feet. Preventing falls at home  · Remove raised doorway thresholds, throw rugs, and clutter. Repair loose carpet or raised areas in the floor. · Move furniture and electrical cords to keep them out of walking paths.   · Use nonskid floor wax, and wipe up spills right away, especially on ceramic tile floors. · If you use a walker or cane, put rubber tips on it. If you use crutches, clean the bottoms of them regularly with an abrasive pad, such as steel wool. · Keep your house well lit, especially [place], and outside walkways. Use night-lights in areas such as hallways and bathrooms. Add extra light switches or use remote switches (such as switches that go on or off when you clap your hands) to make it easier to turn lights on if you have to get up during the night. · Install sturdy handrails on stairways. · Move items in your cabinets so that the things you use a lot are on the lower shelves (about waist level). · Keep a cordless phone and a flashlight with new batteries by your bed. If possible, put a phone in each of the main rooms of your house, or carry a cell phone in case you fall and cannot reach a phone. Or, you can wear a device around your neck or wrist. You push a button that sends a signal for help. · Wear low-heeled shoes that fit well and give your feet good support. Use footwear with nonskid soles. Check the heels and soles of your shoes for wear. Repair or replace worn heels or soles. · Do not wear socks without shoes on wood floors. · Walk on the grass when the sidewalks are slippery. If you live in an area that gets snow and ice in the winter, sprinkle salt on slippery steps and sidewalks. Preventing falls in the bath  · Install grab bars and nonskid mats inside and outside your shower or tub and near the toilet and sinks. · Use shower chairs and bath benches. · Use a hand-held shower head that will allow you to sit while showering. · Get into a tub or shower by putting the weaker leg in first. Get out of a tub or shower with your strong side first.  · Repair loose toilet seats and consider installing a raised toilet seat to make getting on and off the toilet easier. · Keep your bathroom door unlocked while you are in the shower. Where can you learn more?   Go to http://www.gray.com/. Enter 0476 [id number] in the search box to learn more about \"Preventing Falls: Care Instructions. \"  Current as of: March 16, 2018  Content Version: 11.8  © 2835-5005 [employer]. Care instructions adapted under license by [employer] (which disclaims liability or warranty for this information). If you have questions about a medical condition or this instruction, always ask your healthcare professional. [address] any warranty or liability for your use of this information. Patient Education        Preventing Outdoor Falls: Care Instructions  Your Care Instructions     Worries about falls don't need to keep you indoors. Outdoor activities like walking have big benefits for your health. You will need to watch your step and learn a few safety measures. If you are worried about having a fall outdoors, ask your doctor about exercises, classes, or physical therapy that may help. You can learn ways to gain strength, flexibility, and balance. Ask if it might help to use a cane or walker. You can make your time outdoors safer with a few simple measures. Follow-up care is a key part of your treatment and safety. Be sure to make and go to all appointments, and call your doctor if you are having problems. It's also a good idea to know your test results and keep a list of the medicines you take. How can you prevent falls outdoors? · Wear shoes with firm soles and low heels. If you have to walk on an icy surface, use grippers that can be worn over your shoes in bad weather. · Be extra careful if weather is bad. Walk on the grass when the sidewalks are slick. If you live in a place that gets snow and ice in the winter, sprinkle salt on slippery stairs and sidewalks. · Be careful getting on or off buses and trains or getting in and out of cars. If handrails are available, use them. · Be careful when you cross the street.  Look for crosswalks or places where curb cuts or ramps are present. · Try not to hurry, especially if you are carrying something. · Be cautious in parking lots or garages. There may be curbs or changes in pavement, or the height of the pavement may vary. · Make sure to wear the correct eyeglasses, if you need them. Reading glasses or bifocals can make it harder to see hazards that might be in your way. · If you are walking outdoors for exercise, try to:  ? Walk in well-lighted, well-maintained areas. These include high school or college tracks, shopping malls, and public spaces. ? Walk with a partner. ? Watch out for cracked sidewalks, curbs, changes in the height of the pavement, exposed tree roots, and debris such as fallen leaves or branches. Where can you learn more? Go to http://www.washington.com/  Enter U018 in the search box to learn more about \"Preventing Outdoor Falls: Care Instructions. \"  Current as of: September 8, 2021               Content Version: 13.2  © 0894-7670 "[employer]. Care instructions adapted under license by [employer] (which disclaims liability or warranty for this information). If you have questions about a medical condition or this instruction, always ask your healthcare professional. [place] [address] any warranty or liability for your use of this information. Patient Education        How to Get Up Safely After a Fall: Care Instructions  Your Care Instructions     If you have injuries, health problems, or other reasons that may make it easy for you to fall at home, it is a good idea to learn how to get up safely after a fall. Learning how to get up correctly can help you avoid making an injury worse. Also, knowing what to do if you cannot get up can help you stay safe until help arrives. Follow-up care is a key part of your treatment and safety.  Be sure to make and go to all appointments, and call your doctor if you are having problems. It's also a good idea to know your test results and keep a list of the medicines you take. How can you care for yourself after a fall? If you think you can get up  First lie still for a few minutes and think about how you feel. If your body feels okay and you think you can get up safely, follow the rest of the steps below:  1. Look for a chair or other piece of furniture that is close to you. 2. Roll onto your side and rest. Roll by turning your head in the direction you want to roll, move your shoulder and arm, then hip and leg in the same direction. 3. Lie still for a moment to let your blood pressure adjust.  4. Slowly push your upper body up, lift your head, and take a moment to rest.  5. Slowly get up on your hands and knees, and crawl to the chair or other stable piece of furniture. 6. Put your hands on the chair. 7. Move one foot forward, and place it flat on the floor. Your other leg should be bent with the knee on the floor. 8. Rise slowly, turn your body, and sit in the chair. Stay seated for a bit and think about how you feel. Call for help. Even if you feel okay, let someone know what happened to you. You might not know that you have a serious injury. If you cannot get up  1. If you think you are injured after a fall or you cannot get up, try not to panic. 2. Call out for help. 3. If you have a phone within reach or you have an emergency call device, use it to call for help. 4. If you do not have a phone within reach, try to slide yourself toward it. If you cannot get to the phone, try to slide toward a door or window or a place where you think you can be heard. 5. [place] or use an object to make noise so someone might hear you. 6. If you can reach something that you can use for a pillow, place it under your head. Try to stay warm by covering yourself with a blanket or clothing while you wait for help. When should you call for help?    Call 911 anytime you think you may need emergency care. For example, call if:    · You passed out (lost consciousness).     · You cannot get up after a fall.     · You have severe pain. Call your doctor now or seek immediate medical care if:    · You have new or worse pain.     · You are dizzy or lightheaded.     · You hit your head. Watch closely for changes in your health, and be sure to contact your doctor if:    · You do not get better as expected. Where can you learn more? Go to http://www.washington.com/  Enter G513 in the search box to learn more about \"How to Get Up Safely After a Fall: Care Instructions. \"  Current as of: September 8, 2021               Content Version: 13.2  © 2586-2059 [employer]. Care instructions adapted under license by [employer] (which disclaims liability or warranty for this information). If you have questions about a medical condition or this instruction, always ask your healthcare professional. [place] [address] any warranty or liability for your use of this information. Patient Education        High Blood Pressure: Care Instructions  Overview     It's normal for blood pressure to go up and down throughout the day. But if it stays up, you have high blood pressure. Another name for high blood pressure is hypertension. Despite what a lot of people think, high blood pressure usually doesn't cause headaches or make you feel dizzy or lightheaded. It usually has no symptoms. But it does increase your risk of stroke, heart attack, and other problems. You and your doctor will talk about your risks of these problems based on your blood pressure. Your doctor will give you a goal for your blood pressure. Your goal will be based on your health and your age. Lifestyle changes, such as eating healthy and being active, are always important to help lower blood pressure.  You might also take medicine to reach your blood pressure goal.  Follow-up care is a key part of your treatment and safety. Be sure to make and go to all appointments, and call your doctor if you are having problems. It's also a good idea to know your test results and keep a list of the medicines you take. How can you care for yourself at home? Medical treatment  · If you stop taking your medicine, your blood pressure will go back up. You may take one or more types of medicine to lower your blood pressure. Be safe with medicines. Take your medicine exactly as prescribed. Call your doctor if you think you are having a problem with your medicine. · Talk to your doctor before you start taking aspirin every day. Aspirin can help certain people lower their risk of a heart attack or stroke. But taking aspirin isn't right for everyone, because it can cause serious bleeding. · See your doctor regularly. You may need to see the doctor more often at first or until your blood pressure comes down. · If you are taking blood pressure medicine, talk to your doctor before you take decongestants or anti-inflammatory medicine, such as ibuprofen. Some of these medicines can raise blood pressure. · Learn how to check your blood pressure at home. Lifestyle changes  · Stay at a healthy weight. This is especially important if you put on weight around the waist. Losing even 10 pounds can help you lower your blood pressure. · If your doctor recommends it, get more exercise. Walking is a good choice. Bit by bit, increase the amount you walk every day. Try for at least 30 minutes on most days of the week. You also may want to swim, bike, or do other activities. · Avoid or limit alcohol. Talk to your doctor about whether you can drink any alcohol. · Try to limit how much sodium you eat to less than 2,300 milligrams (mg) a day. Your doctor may ask you to try to eat less than 1,500 mg a day.   · Eat plenty of fruits (such as bananas and oranges), vegetables, legumes, whole grains, and low-fat dairy products. · Lower the amount of saturated fat in your diet. Saturated fat is found in animal products such as milk, cheese, and meat. Limiting these foods may help you lose weight and also lower your risk for heart disease. · Do not smoke. Smoking increases your risk for heart attack and stroke. If you need help quitting, talk to your doctor about stop-smoking programs and medicines. These can increase your chances of quitting for good. When should you call for help? Call 911  anytime you think you may need emergency care. This may mean having symptoms that suggest that your blood pressure is causing a serious heart or blood vessel problem. Your blood pressure may be over 180/120. For example, call 911 if:    · You have symptoms of a heart attack. These may include:  ? Chest pain or pressure, or a strange feeling in the chest.  ? Sweating. ? Shortness of breath. ? Nausea or vomiting. ? Pain, pressure, or a strange feeling in the back, neck, jaw, or upper belly or in one or both shoulders or arms. ? Lightheadedness or sudden weakness. ? A fast or irregular heartbeat.     · You have symptoms of a stroke. These may include:  ? Sudden numbness, tingling, weakness, or loss of movement in your face, arm, or leg, especially on only one side of your body. ? Sudden vision changes. ? Sudden trouble speaking. ? Sudden confusion or trouble understanding simple statements. ? Sudden problems with walking or balance. ? A sudden, severe headache that is different from past headaches.     · You have severe back or belly pain. Do not wait until your blood pressure comes down on its own. Get help right away. Call your doctor now or seek immediate care if:    · Your blood pressure is much higher than normal (such as 180/120 or higher), but you don't have symptoms.     · You think high blood pressure is causing symptoms, such as:  ? Severe headache.  ? Blurry vision.    Watch closely for changes in your health, and be sure to contact your doctor if:    · Your blood pressure measures higher than your doctor recommends at least 2 times. That means the top number is higher or the bottom number is higher, or both.     · You think you may be having side effects from your blood pressure medicine. Where can you learn more? Go to http://www.gray.com/  Enter [id number] in the search box to learn more about \"High Blood Pressure: Care Instructions. \"  Current as of: January 10, 2022               Content Version: 13.2  © 2006-2022 [employer]. Care instructions adapted under license by [employer] (which disclaims liability or warranty for this information). If you have questions about a medical condition or this instruction, always ask your healthcare professional. [address] any warranty or liability for your use of this information.

## 2022-07-03 NOTE — ED PROVIDER NOTES
EMERGENCY DEPARTMENT HISTORY AND PHYSICAL EXAM      Date: 7/3/2022  Patient Name: Nallely Bar      History of Presenting Illness     Chief Complaint   Patient presents with    Hypertension    Dizziness       History Provided By: Patient and Patient's Wife    HPI: Nallely Bar, 80 y.o. male with a past medical history significant hypertension, stroke and Diminished hearing presents to the ED with cc of dizziness at home upon awakening this morning about 1000 hrs. symptoms were not relieved with patient's already prescribed medications for dizziness    There are no other complaints, changes, or physical findings at this time. PCP: Alma Roque MD    Current Outpatient Medications   Medication Sig Dispense Refill    atorvastatin (LIPITOR) 40 mg tablet Take  by mouth daily.  lisinopriL (PRINIVIL, ZESTRIL) 40 mg tablet Take 40 mg by mouth daily.  amLODIPine (NORVASC) 5 mg tablet Take 5 mg by mouth daily.  allopurinoL (ZYLOPRIM) 300 mg tablet Take 300 mg by mouth daily.  metoprolol tartrate (LOPRESSOR) 50 mg tablet Take  by mouth two (2) times a day.  metFORMIN (GLUCOPHAGE) 500 mg tablet Take  by mouth two (2) times daily (with meals).  clopidogreL (PLAVIX) 75 mg tab Take 75 mg by mouth.  aspirin delayed-release 81 mg tablet Take  by mouth daily.  acetaminophen (TYLENOL) 325 mg tablet Take 2 Tabs by mouth every four (4) hours as needed for Pain. 20 Tab 0       Past History   Past Medical History:  Past Medical History:   Diagnosis Date    CVA [facility]cerebral vascular accident) ([facility] [address] 11/2019    Ear problems 7/22/2020    Hearing loss 7/22/2020    HTN (hypertension)     Kidney stones     Prostate cancer ([facility] [address]     Skin cancer        Past Surgical History:  No past surgical history on file. Family History:  No family history on file.     Social History:  Social History     Tobacco Use    Smoking status: Never Smoker    Smokeless tobacco: Never Used   Substance Use Topics    Alcohol use: Not Currently    Drug use: Never       Allergies:  No Known Allergies  Review of Systems   Review of Systems   Constitutional: Negative for chills and fever. HENT: Negative for rhinorrhea and sore throat. Eyes: Negative for pain and visual disturbance. Respiratory: Negative for cough and shortness of breath. Cardiovascular: Negative for chest pain and leg swelling. Gastrointestinal: Negative for abdominal pain and vomiting. Endocrine: Negative for polydipsia and polyuria. Genitourinary: Negative for dysuria and hematuria. Musculoskeletal: Negative for back pain and neck pain. Skin: Negative for color change and pallor. Neurological: Positive for dizziness. Negative for seizures, syncope, speech difficulty, weakness and headaches. Psychiatric/Behavioral: Negative for agitation and suicidal ideas. Physical Exam   Physical Exam  Vitals and nursing note reviewed. Constitutional:       General: He is not in acute distress. Appearance: He is not ill-appearing, toxic-appearing or diaphoretic. HENT:      Head: Normocephalic and atraumatic. Right Ear: Tympanic membrane normal.      Left Ear: Tympanic membrane normal.      Nose: Nose normal. No congestion. Mouth/Throat:      Mouth: Mucous membranes are dry. Pharynx: Oropharynx is clear. Eyes:      Extraocular Movements: Extraocular movements intact. Conjunctiva/sclera: Conjunctivae normal.      Pupils: Pupils are equal, round, and reactive to light. Cardiovascular:      Rate and Rhythm: Normal rate and regular rhythm. Pulses: Normal pulses. Heart sounds: Normal heart sounds. Pulmonary:      Effort: Pulmonary effort is normal.      Breath sounds: Normal breath sounds. Abdominal:      General: Bowel sounds are normal.      Palpations: Abdomen is soft. Tenderness: There is no abdominal tenderness.    Musculoskeletal:         General: No tenderness, deformity or signs of injury. Normal range of motion. Cervical back: Normal range of motion and neck supple. No rigidity or tenderness. Lymphadenopathy:      Cervical: No cervical adenopathy. Skin:     General: Skin is warm and dry. Capillary Refill: Capillary refill takes less than 2 seconds. Findings: No rash. Neurological:      General: No focal deficit present. Mental Status: He is alert and oriented to person, place, and time. Cranial Nerves: No cranial nerve deficit. Sensory: No sensory deficit. Psychiatric:         Mood and Affect: Mood normal.         Behavior: Behavior normal.         Lab and Diagnostic Study Results   Labs -     Recent Results (from the past 12 hour(s))   EKG, 12 LEAD, INITIAL    Collection Time: 07/03/22 12:33 PM   Result Value Ref Range    Ventricular Rate 82 BPM    Atrial Rate 82 BPM    P-R Interval 191 ms    QRS Duration 97 ms    Q-T Interval 484 ms    QTC Calculation (Bezet) 566 ms    Calculated P Axis -11 degrees    Calculated R Axis -22 degrees    Calculated T Axis 144 degrees    Diagnosis       Sinus rhythm  Borderline left axis deviation  Borderline repolarization abnormality  Prolonged QT interval  Baseline wander in lead(s) I,II,aVR         Radiologic Studies -   [unfilled]  CT Results  (Last 48 hours)    None        CXR Results  (Last 48 hours)    None          Medical Decision Making and ED Course   - I am the first and primary provider for this patient AND AM THE PRIMARY PROVIDER OF RECORD. I reviewed the vital signs, available nursing notes, past medical history, past surgical history, family history and social history. - Initial assessment performed. The patients presenting problems have been discussed, and the staff are in agreement with the care plan formulated and outlined with them. I have encouraged them to ask questions as they arise throughout their visit.     Differential Diagnosis & Medical Decision Making Provider Note:   Dizziness DDx CVA, hypovolemia, electrolyte imbalance  MDM     Vital Signs-Reviewed the patient's vital signs. Patient Vitals for the past 12 hrs:   Temp Pulse Resp BP SpO2   07/03/22 1238 97.9 °F (36.6 °C) 82 16 (!) 183/109 95 %       EKG interpretation: (Preliminary): Performed at 1233. EKG Interpreted by me. Shows each rhythm rate 82 occasional PVCs no acute ischemic changes diffuse flattened T waves    ED Course:        HYPERTENSION COUNSELING: Education was provided to the patient today regarding their hypertension. Patient is made aware of their elevated blood pressure and is instructed to follow up this week with their Primary Care for a recheck. Patient is counseled regarding consequences of chronic, uncontrolled hypertension including kidney disease, heart disease, stroke or even death. Patient states their understanding and agrees to follow up this week. Additionally, during their visit, I discussed sodium restriction, maintaining ideal body weight and regular exercise program as physiologic means to achieve blood pressure control. The patient will strive towards this. Procedures and Critical Care     Performed by: Caity Callaway MD  Procedures      CRITICAL CARE NOTE :  12:43 PM  Amount of Critical Care Time: 38minutes    IMPENDING DETERIORATION -Cardiovascular  ASSOCIATED RISK FACTORS - CNS Decompensation  MANAGEMENT- Bedside Assessment  INTERPRETATION -  Blood Pressure  INTERVENTIONS - Metobolic interventions  CASE REVIEW -ED physician  TREATMENT RESPONSE -Improved and Stable  PERFORMED BY - Self    NOTES   :  I have spent the above critical care time involved in lab review, consultations with specialist, family decision- making, bedside attention and documentation. This time excludes time spent in any separate billed procedures. During this entire length of time I was immediately available to the patient .     Caity Callaway MD    Disposition   Disposition: Condition stable and improved  Admitted to Observation Unit the case was discussed with the admitting physician Chris        DISCHARGE PLAN:  1. Current Discharge Medication List      CONTINUE these medications which have NOT CHANGED    Details   atorvastatin (LIPITOR) 40 mg tablet Take  by mouth daily. lisinopriL (PRINIVIL, ZESTRIL) 40 mg tablet Take 40 mg by mouth daily. amLODIPine (NORVASC) 5 mg tablet Take 5 mg by mouth daily. allopurinoL (ZYLOPRIM) 300 mg tablet Take 300 mg by mouth daily. metoprolol tartrate (LOPRESSOR) 50 mg tablet Take  by mouth two (2) times a day. metFORMIN (GLUCOPHAGE) 500 mg tablet Take  by mouth two (2) times daily (with meals). clopidogreL (PLAVIX) 75 mg tab Take 75 mg by mouth. aspirin delayed-release 81 mg tablet Take  by mouth daily. acetaminophen (TYLENOL) 325 mg tablet Take 2 Tabs by mouth every four (4) hours as needed for Pain. Qty: 20 Tab, Refills: 0           2. Follow-up Information    None       3. Return to ED if worse   4. Current Discharge Medication List        Remove if not discharged    Diagnosis/Clinical Impression     Clinical Impression: No diagnosis found. Attestations:  Emani Del Rio MD    Please note that this dictation was completed with [employer], the [employer] voice recognition software. Quite often unanticipated grammatical, syntax, homophones, and other interpretive errors are inadvertently transcribed by the computer software. Please disregard these errors. Please excuse any errors that have escaped final proofreading. Thank you.

## 2022-07-03 NOTE — ED TRIAGE NOTES
Patient brought in by [facility] for dizziness, family initially called 911 for irregular breathing & dizziness. Patient was extremely hypertensive with EMS 280s/130s. Patient's last known well was at 0730 this morning. Patient reports slight dizziness still present at triage.

## 2022-07-04 ENCOUNTER — APPOINTMENT (OUTPATIENT)
Dept: GENERAL RADIOLOGY | Age: 87
DRG: 305 | End: 2022-07-04
Attending: HOSPITALIST
Payer: MEDICARE

## 2022-07-04 PROBLEM — R33.9 URINARY RETENTION: Status: ACTIVE | Noted: 2022-07-04

## 2022-07-04 PROBLEM — I10 HTN (HYPERTENSION): Status: ACTIVE | Noted: 2022-07-04

## 2022-07-04 PROBLEM — R00.1 BRADYCARDIA: Status: ACTIVE | Noted: 2022-07-04
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
## 2022-07-04 PROBLEM — I10 HTN (HYPERTENSION), MALIGNANT: Status: ACTIVE | Noted: 2022-07-04

## 2022-07-04 LAB
ANION GAP SERPL CALC-SCNC: 10 MMOL/L (ref 5–15)
BNP SERPL-MCNC: 743 PG/ML
BUN SERPL-MCNC: 23 MG/DL (ref 6–20)
BUN/CREAT SERPL: 18 (ref 12–20)
CA-I BLD-MCNC: 10.1 MG/DL (ref 8.5–10.1)
CHLORIDE SERPL-SCNC: 112 MMOL/L (ref 97–108)
CO2 SERPL-SCNC: 22 MMOL/L (ref 21–32)
CREAT SERPL-MCNC: 1.27 MG/DL (ref 0.7–1.3)
GLUCOSE BLD STRIP.AUTO-MCNC: 131 MG/DL (ref 65–117)
GLUCOSE BLD STRIP.AUTO-MCNC: 140 MG/DL (ref 65–117)
GLUCOSE BLD STRIP.AUTO-MCNC: 148 MG/DL (ref 65–117)
GLUCOSE BLD STRIP.AUTO-MCNC: 181 MG/DL (ref 65–117)
GLUCOSE SERPL-MCNC: 143 MG/DL (ref 65–100)
MAGNESIUM SERPL-MCNC: 1.6 MG/DL (ref 1.6–2.4)
PERFORMED BY, TECHID: ABNORMAL
POTASSIUM SERPL-SCNC: 4.1 MMOL/L (ref 3.5–5.1)
SODIUM SERPL-SCNC: 144 MMOL/L (ref 136–145)
TROPONIN-HIGH SENSITIVITY: 21 NG/L (ref 0–76)
TROPONIN-HIGH SENSITIVITY: 25 NG/L (ref 0–76)
TSH SERPL DL<=0.05 MIU/L-ACNC: 1.76 UIU/ML (ref 0.36–3.74)

## 2022-07-04 PROCEDURE — 51798 US URINE CAPACITY MEASURE: CPT

## 2022-07-04 PROCEDURE — 84484 ASSAY OF TROPONIN QUANT: CPT

## 2022-07-04 PROCEDURE — 36415 COLL VENOUS BLD VENIPUNCTURE: CPT

## 2022-07-04 PROCEDURE — 74011000250 HC RX REV CODE- 250: Performed by: HOSPITALIST

## 2022-07-04 PROCEDURE — 71046 X-RAY EXAM CHEST 2 VIEWS: CPT

## 2022-07-04 PROCEDURE — 83036 HEMOGLOBIN GLYCOSYLATED A1C: CPT

## 2022-07-04 PROCEDURE — G0378 HOSPITAL OBSERVATION PER HR: HCPCS

## 2022-07-04 PROCEDURE — 93005 ELECTROCARDIOGRAM TRACING: CPT

## 2022-07-04 PROCEDURE — 80048 BASIC METABOLIC PNL TOTAL CA: CPT

## 2022-07-04 PROCEDURE — 82962 GLUCOSE BLOOD TEST: CPT

## 2022-07-04 PROCEDURE — 74011250636 HC RX REV CODE- 250/636: Performed by: HOSPITALIST

## 2022-07-04 PROCEDURE — 84443 ASSAY THYROID STIM HORMONE: CPT

## 2022-07-04 PROCEDURE — 74011250637 HC RX REV CODE- 250/637: Performed by: HOSPITALIST

## 2022-07-04 PROCEDURE — 83735 ASSAY OF MAGNESIUM: CPT

## 2022-07-04 PROCEDURE — 74011636637 HC RX REV CODE- 636/637: Performed by: HOSPITALIST

## 2022-07-04 PROCEDURE — 83880 ASSAY OF NATRIURETIC PEPTIDE: CPT

## 2022-07-04 PROCEDURE — 65270000029 HC RM PRIVATE

## 2022-07-04 RX ORDER — METOPROLOL TARTRATE 25 MG/1
12.5 TABLET, FILM COATED ORAL 2 TIMES DAILY
Status: DISCONTINUED | OUTPATIENT
Start: 2022-07-04 | End: 2022-07-04

## 2022-07-04 RX ORDER — ASPIRIN 81 MG/1
81 TABLET ORAL DAILY
Status: DISCONTINUED | OUTPATIENT
Start: 2022-07-04 | End: 2022-07-05 | Stop reason: HOSPADM

## 2022-07-04 RX ORDER — TAMSULOSIN HYDROCHLORIDE 0.4 MG/1
0.4 CAPSULE ORAL DAILY
Status: DISCONTINUED | OUTPATIENT
Start: 2022-07-04 | End: 2022-07-05 | Stop reason: HOSPADM

## 2022-07-04 RX ORDER — INSULIN LISPRO 100 [IU]/ML
INJECTION, SOLUTION INTRAVENOUS; SUBCUTANEOUS
Status: DISCONTINUED | OUTPATIENT
Start: 2022-07-04 | End: 2022-07-05 | Stop reason: HOSPADM

## 2022-07-04 RX ORDER — DEXTROSE 50 % IN WATER (D50W) INTRAVENOUS SYRINGE
25-50 AS NEEDED
Status: DISCONTINUED | OUTPATIENT
Start: 2022-07-04 | End: 2022-07-05 | Stop reason: HOSPADM

## 2022-07-04 RX ORDER — CLOPIDOGREL BISULFATE 75 MG/1
75 TABLET ORAL DAILY
Status: DISCONTINUED | OUTPATIENT
Start: 2022-07-04 | End: 2022-07-05 | Stop reason: HOSPADM

## 2022-07-04 RX ORDER — ALLOPURINOL 300 MG/1
300 TABLET ORAL DAILY
Status: DISCONTINUED | OUTPATIENT
Start: 2022-07-04 | End: 2022-07-05 | Stop reason: HOSPADM

## 2022-07-04 RX ORDER — LANOLIN ALCOHOL/MO/W.PET/CERES
400 CREAM (GRAM) TOPICAL DAILY
Status: DISCONTINUED | OUTPATIENT
Start: 2022-07-04 | End: 2022-07-05 | Stop reason: HOSPADM

## 2022-07-04 RX ORDER — LISINOPRIL 20 MG/1
20 TABLET ORAL 2 TIMES DAILY
Status: DISCONTINUED | OUTPATIENT
Start: 2022-07-04 | End: 2022-07-05 | Stop reason: HOSPADM

## 2022-07-04 RX ORDER — METFORMIN HYDROCHLORIDE 500 MG/1
500 TABLET ORAL
Status: DISCONTINUED | OUTPATIENT
Start: 2022-07-05 | End: 2022-07-05 | Stop reason: HOSPADM

## 2022-07-04 RX ORDER — METFORMIN HYDROCHLORIDE 500 MG/1
500 TABLET ORAL 2 TIMES DAILY WITH MEALS
Status: DISCONTINUED | OUTPATIENT
Start: 2022-07-04 | End: 2022-07-04

## 2022-07-04 RX ORDER — MAGNESIUM SULFATE 100 %
4 CRYSTALS MISCELLANEOUS AS NEEDED
Status: DISCONTINUED | OUTPATIENT
Start: 2022-07-04 | End: 2022-07-05 | Stop reason: HOSPADM

## 2022-07-04 RX ORDER — MAGNESIUM SULFATE HEPTAHYDRATE 40 MG/ML
2 INJECTION, SOLUTION INTRAVENOUS ONCE
Status: COMPLETED | OUTPATIENT
Start: 2022-07-04 | End: 2022-07-04

## 2022-07-04 RX ADMIN — SODIUM CHLORIDE, PRESERVATIVE FREE 10 ML: 5 INJECTION INTRAVENOUS at 14:17

## 2022-07-04 RX ADMIN — Medication 1 CAPSULE: at 16:26

## 2022-07-04 RX ADMIN — ALLOPURINOL 300 MG: 300 TABLET ORAL at 09:28

## 2022-07-04 RX ADMIN — SODIUM CHLORIDE, PRESERVATIVE FREE 10 ML: 5 INJECTION INTRAVENOUS at 08:39

## 2022-07-04 RX ADMIN — TAMSULOSIN HYDROCHLORIDE 0.4 MG: 0.4 CAPSULE ORAL at 10:52

## 2022-07-04 RX ADMIN — SODIUM CHLORIDE, PRESERVATIVE FREE 10 ML: 5 INJECTION INTRAVENOUS at 21:05

## 2022-07-04 RX ADMIN — SODIUM CHLORIDE, PRESERVATIVE FREE 10 ML: 5 INJECTION INTRAVENOUS at 14:11

## 2022-07-04 RX ADMIN — MAGNESIUM SULFATE HEPTAHYDRATE 2 G: 40 INJECTION, SOLUTION INTRAVENOUS at 08:42

## 2022-07-04 RX ADMIN — SODIUM CHLORIDE, PRESERVATIVE FREE 10 ML: 5 INJECTION INTRAVENOUS at 05:41

## 2022-07-04 RX ADMIN — MAGNESIUM OXIDE 400 MG: 400 TABLET ORAL at 09:28

## 2022-07-04 RX ADMIN — METFORMIN HYDROCHLORIDE 500 MG: 500 TABLET ORAL at 08:38

## 2022-07-04 RX ADMIN — CLOPIDOGREL BISULFATE 75 MG: 75 TABLET ORAL at 09:28

## 2022-07-04 RX ADMIN — INSULIN LISPRO 2 UNITS: 100 INJECTION, SOLUTION INTRAVENOUS; SUBCUTANEOUS at 12:22

## 2022-07-04 RX ADMIN — ASPIRIN 81 MG: 81 TABLET, COATED ORAL at 09:28

## 2022-07-04 RX ADMIN — LISINOPRIL 20 MG: 20 TABLET ORAL at 20:46

## 2022-07-04 RX ADMIN — Medication 1 CAPSULE: at 09:28

## 2022-07-04 NOTE — ROUTINE PROCESS
This 81 y/o make was admitted to room 202 via w/c from ER with DX Hypertension and dizziness. Assisted into gown and monitor applied. Caregiver with patient. Admission  documentation completed.

## 2022-07-04 NOTE — PROGRESS NOTES
Problem: Falls - Risk of  Goal: *Absence of Falls  Description: Document Bolivar Alves Fall Risk and appropriate interventions in the flowsheet.   Outcome: Progressing Towards Goal  Note: Fall Risk Interventions:  Mobility Interventions: Patient to call before getting OOB         Medication Interventions: Teach patient to arise slowly    Elimination Interventions: Call light in reach    History of Falls Interventions: Door open when patient unattended         Problem: General Medical Care Plan  Goal: *Vital signs within specified parameters  Outcome: Progressing Towards Goal  Goal: *Labs within defined limits  Outcome: Progressing Towards Goal

## 2022-07-04 NOTE — PROGRESS NOTES
Problem: Falls - Risk of  Goal: *Absence of Falls  Description: Document Margareth Torres Fall Risk and appropriate interventions in the flowsheet.   Outcome: Progressing Towards Goal  Note: Fall Risk Interventions:  Mobility Interventions: Patient to call before getting OOB         Medication Interventions: Teach patient to arise slowly    Elimination Interventions: Call light in reach    History of Falls Interventions: Room close to nurse's station         Problem: Patient Education: Go to Patient Education Activity  Goal: Patient/Family Education  Outcome: Progressing Towards Goal     Problem: General Medical Care Plan  Goal: *Vital signs within specified parameters  Outcome: Progressing Towards Goal  Goal: *Labs within defined limits  Outcome: Progressing Towards Goal  Goal: *Absence of infection signs and symptoms  Outcome: Progressing Towards Goal  Goal: *Optimal pain control at patient's stated goal  Outcome: Progressing Towards Goal  Goal: *Skin integrity maintained  Outcome: Progressing Towards Goal  Goal: *Fluid volume balance  Outcome: Progressing Towards Goal  Goal: *Optimize nutritional status  Outcome: Progressing Towards Goal  Goal: *Anxiety reduced or absent  Outcome: Progressing Towards Goal  Goal: *Progressive mobility and function (eg: ADL's)  Outcome: Progressing Towards Goal     Problem: Hypertension  Goal: *Blood pressure within specified parameters  Outcome: Progressing Towards Goal  Goal: *Fluid volume balance  Outcome: Progressing Towards Goal  Goal: *Labs within defined limits  Outcome: Progressing Towards Goal

## 2022-07-04 NOTE — ROUTINE PROCESS
Bedside shift change report given to edwina (oncoming nurse) by Josue Hathaway (offgoing nurse). Report included the following information [employer].

## 2022-07-04 NOTE — H&P
History and Physical      Chief Complaints:     Chief Complaint   Patient presents with    Hypertension    Dizziness         Subjective:     Jessica Hammer is a 80 y.o. 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 Presents from home with dizziness and noted elevated blood pressure. Patient friend/caretaker very good historian and monitors patient blood pressure and glucose levels. She states that he normally wakes up late around 10:00 and at times can have episodes of dizziness upon awakening but usually after a dose of meclizine dizziness generally resolves after about 10 minutes of resting in bed. But yesterday on 7/3 patient's dizziness did not resolve and at that time blood pressure was checked and it was found to be 192/103 she said she gave his blood pressure medications which only blood pressure he takes in the morning is metoprolol tartrate 50 mg dose and all other blood pressure medications including lisinopril 40 mg and amlodipine 5 mg was taken at night on 7/2. She was told if diastolic blood pressure is greater than 100 to call EMS and so that is what she did and upon EMSs arrival the blood pressure was stable at 140s over 100. She states normally her blood pressure runs in the 1 40-1 50 range over 80 and that was instructed to not let blood pressure get above 043 and diastolic above 376. Patient has had falls at times and last fall was about 2 weeks ago where his legs gave out. He normally does not have to use a walker but does have 1 at home. Patient denied any chest pain shortness of breath denies any recent cold or fever chills or sick contacts.   Upon arrival to the emergency room blood pressure 183/109 with a heart rate of 82 temp 97.9 and O2 sat 95% on room air. Patient was given a 1 L normal saline bolus and with continued dizziness was given a 50 mg dose of meclizine around 1 PM.  At that time was given labetalol 10 mg IV x1 but on recheck blood pressure initially improved to 155/94 but then rebounded up again to 185/110 so additional 10 mg of IV labetalol was given around 3 PM and on recheck was still elevated at 165/108 and so 30 mg of nifedipine was given at 4 PM as well as a clonidine 0.2 mg x 1. At 6 PM blood pressure was close to 155/98 and was given a 20 mg IV hydralazine dose. At that time CT head was requested and performed and found to be negative. Patient's dizziness had resolved. Initial labs today show troponin of 15 and repeat troponin this a.m. was 25 magnesium done this morning also was noted to be at 1.6 and a BNP was noted to be 743. Patient blood pressure upon continued monitoring continue to be on downward trend at 132/82 and then by 715 was down to 106/56 and overnight at 3 AM was down to 98/60 and 97/64 this morning at 7 AM patient's caretaker had been monitoring the blood pressure when it was being taken every 15 minutes in the emergency room and she noted that and got scared about his diastolic blood pressure going as low as 56. This morning blood pressure on recheck was at 135/67 and soon after was 120/70 with heart rate improving to 83. Blood pressure medications held as normally they are tablet taken at night and had extensive conversation about adjusting medications as it was noted that even with lisinopril dose at night blood pressure in the morning still is in the 140-150. Is also noted overnight that 3-4 episodes of bradycardia was noted with a low heart rate of 35-37 and very transient and patient on evaluation by nurse was asymptomatic.   There is note of patient having increased mouth breathing and snoring and caretaker did note some possible pauses in his breathing but never diagnosed with sleep apnea. Last beta-blocker dose that patient received was yesterday a.m. around 10:00 of 50 mg but she also notes that his heart rate generally stays in the 60 range even prior to beta-blocker dosage. EKG initially shows sinus rhythm and repeat EKG are this morning showed sinus rhythm but incomplete left bundle branch block. Patient was presentation was referred for admission for hypertensive urgency, bradycardic episode, dizziness      Past Medical History:   Diagnosis Date    [address]A (cerebral vascular [address]) [address] 11/2019    hx of right side weakness    Diabetes ([facility] [facility]     Ear problems 7/22/2020    Gout     Hearing loss 7/22/2020    HTN (hypertension)     Kidney stones     Macular degeneration     Prostate cancer ([facility] [facility]     Skin cancer       No past surgical history on file. Family History   Problem Relation Age of Onset    Heart Attack Mother     Stroke Father       Social History     Tobacco Use    Smoking status: Never Smoker    Smokeless tobacco: Never Used   Substance Use Topics    Alcohol use: Not Currently       Prior to Admission medications    Medication Sig Start Date End Date Taking? Authorizing Provider   atorvastatin (LIPITOR) 40 mg tablet Take  by mouth daily. Yes Other, MD Sol   lisinopriL (PRINIVIL, ZESTRIL) 40 mg tablet Take 40 mg by mouth daily. Yes Sol Chavarria MD   amLODIPine (NORVASC) 5 mg tablet Take 5 mg by mouth daily. Yes Sol Chavarria MD   allopurinoL (ZYLOPRIM) 300 mg tablet Take 300 mg by mouth daily. Yes Sol Chavarria MD   metoprolol tartrate (LOPRESSOR) 50 mg tablet Take 50 mg by mouth Every morning. Yes Aura, MD Sol   metFORMIN (GLUCOPHAGE) 500 mg tablet Take  by mouth two (2) times daily (with meals). Yes Aura, MD Sol   clopidogreL (PLAVIX) 75 mg tab Take 75 mg by mouth. Yes Sol Chavarria MD   aspirin delayed-release 81 mg tablet Take  by mouth daily.    Yes Other, MD Sol   acetaminophen (TYLENOL) 325 mg tablet Take 2 Tabs by mouth every four (4) hours as needed for Pain. 4/26/21  Yes Dejah Wood MD     No Known Allergies     Review of Systems:  Review of Systems   Constitutional: Negative for chills, diaphoresis, fatigue and fever. HENT: Negative for congestion, ear pain, postnasal drip, sinus pain and sore throat. Eyes: Negative for pain, discharge and redness. Respiratory: Negative for cough, shortness of breath and wheezing. Cardiovascular: Negative for chest pain and palpitations. Gastrointestinal: Negative for abdominal pain, constipation, diarrhea, nausea and vomiting. Genitourinary: Negative for dysuria, flank pain, frequency and urgency. Musculoskeletal: Negative for arthralgias and myalgias. Neurological: Negative for dizziness, weakness and headaches. Psychiatric/Behavioral: Negative for agitation and hallucinations. The patient is not nervous/anxious. Objective:     Vitals:  Visit Vitals  BP 97/64 (BP 1 Location: Right upper arm, BP Patient Position: At rest;Lying)   Pulse 82   Temp 97.7 °F (36.5 °C)   Resp 18   Ht 5' 7.99\" (1.727 m)   Wt 86.3 kg (190 lb 3.2 oz)   SpO2 94%   BMI 28.93 kg/m²       Physical Exam:  General: Alert, cooperative, no distress. Head:  Normocephalic, without obvious abnormality, atraumatic. Eyes:  Conjunctivae/corneas clear. Pupils equal, round, reactive to light. Extraocular movements intact. Lungs:  Clear to auscultation bilaterally, no wheezes, crackles  Chest wall: No tenderness or deformity. Heart:  Regular rate and rhythm, S1, S2 normal, no murmur, click, rub, or gallop. Abdomen:   Soft, non-tender. Bowel sounds normal. No masses. No organomegaly. Back:  No spine tenderness to palpation  Extremities: Extremities normal, atraumatic, no cyanosis or edema. Pulses: Symmetric all extremities.   Skin: Skin color, texture, turgor normal.   Lymph nodes: Cervical nodes normal.  Neurologic: CNII-XII intact. Normal strength, sensation, and reflexes throughout. Labs:  Recent Results (from the past 24 hour(s))   EKG, 12 LEAD, INITIAL    Collection Time: 07/03/22 12:33 PM   Result Value Ref Range    Ventricular Rate 82 BPM    Atrial Rate 82 BPM    P-R Interval 191 ms    QRS Duration 97 ms    Q-T Interval 484 ms    QTC Calculation (Bezet) 566 ms    Calculated P Axis -11 degrees    Calculated R Axis -22 degrees    Calculated T Axis 144 degrees    Diagnosis       Sinus rhythm  Borderline left axis deviation  Borderline repolarization abnormality  Prolonged QT interval  Baseline wander in lead(s) I,II,aVR     CBC WITH AUTOMATED DIFF    Collection Time: 07/03/22 12:42 PM   Result Value Ref Range    WBC 6.7 4.4 - 11.3 K/uL    RBC 4.57 4.50 - 5.90 M/uL    HGB 12.4 (L) 13.5 - 17.5 g/dL    HCT 38.9 (L) 41 - 53 %    MCV 85.2 80 - 100 FL    MCH 27.2 (L) 31 - 34 PG    MCHC 31.9 31.0 - 36.0 g/dL    RDW 16.2 (H) 11.5 - 14.5 %    PLATELET 052 031 - 373 K/uL    MPV 7.8 6.5 - 11.5 FL    NRBC 0.1  WBC    ABSOLUTE NRBC 0.00 K/uL    NEUTROPHILS 77 (H) 42 - 75 %    LYMPHOCYTES 15 (L) 20.5 - 51.1 %    MONOCYTES 4 1.7 - 9.3 %    EOSINOPHILS 3 (H) 0.9 - 2.9 %    BASOPHILS 1 0.0 - 2.5 %    ABS. NEUTROPHILS 5.1 1.8 - 7.7 K/UL    ABS. LYMPHOCYTES 1.0 1.0 - 4.8 K/UL    ABS. MONOCYTES 0.3 0.2 - 2.4 K/UL    ABS. EOSINOPHILS 0.2 0.0 - 0.7 K/UL    ABS.  BASOPHILS 0.0 0.0 - 0.2 K/UL   METABOLIC PANEL, COMPREHENSIVE    Collection Time: 07/03/22 12:42 PM   Result Value Ref Range    Sodium 141 136 - 145 mmol/L    Potassium 4.5 3.5 - 5.1 mmol/L    Chloride 109 (H) 97 - 108 mmol/L    CO2 24 21 - 32 mmol/L    Anion gap 8 5 - 15 mmol/L    Glucose 162 (H) 65 - 100 mg/dL    BUN 16 6 - 20 mg/dL    Creatinine 1.16 0.70 - 1.30 mg/dL    BUN/Creatinine ratio 14 12 - 20      GFR est AA >60 >60 ml/min/1.73m2    GFR est non-AA 60 (L) >60 ml/min/1.73m2    Calcium 10.6 (H) 8.5 - 10.1 mg/dL    Bilirubin, total 0.5 0.2 - 1.0 mg/dL    AST (SGOT) 24 15 - 37 U/L    ALT (SGPT) 30 12 - 78 U/L    Alk.  phosphatase 108 45 - 117 U/L    Protein, total 7.0 6.4 - 8.2 g/dL    Albumin 3.5 3.5 - 5.0 g/dL    Globulin 3.5 2.0 - 4.0 g/dL    A-G Ratio 1.0 (L) 1.1 - 2.2     MAGNESIUM    Collection Time: 07/03/22 12:42 PM   Result Value Ref Range    Magnesium 1.7 1.6 - 2.4 mg/dL   TROPONIN-HIGH SENSITIVITY    Collection Time: 07/03/22 12:42 PM   Result Value Ref Range    Troponin-High Sensitivity 15 0 - 76 ng/L   PROTHROMBIN TIME + INR    Collection Time: 07/03/22 12:42 PM   Result Value Ref Range    Prothrombin time 13.7 11.9 - 14.6 sec    INR 1.1 0.9 - 1.1     PTT    Collection Time: 07/03/22 12:42 PM   Result Value Ref Range    aPTT 27.8 21.2 - 34.1 sec    aPTT, therapeutic range   82 - 109 sec   URINALYSIS W/ RFLX MICROSCOPIC    Collection Time: 07/03/22  2:29 PM   Result Value Ref Range    Color Yellow/Straw      Appearance Clear Clear      Specific gravity 1.025 1.003 - 1.030      pH (UA) 6.0 5.0 - 8.0      Protein 100 (A) Negative mg/dL    Glucose Negative Negative mg/dL    Ketone Negative Negative mg/dL    Bilirubin Negative Negative      Blood Trace (A) Negative      Urobilinogen 0.2 0.2 - 1.0 EU/dL    Nitrites Negative Negative      Leukocyte Esterase Negative Negative     URINE MICROSCOPIC    Collection Time: 07/03/22  2:29 PM   Result Value Ref Range    WBC 0-4 0 - 5 /hpf    RBC 0-5 0 - 3 /hpf    Bacteria 1+ (A) Negative /hpf   METABOLIC PANEL, BASIC    Collection Time: 07/04/22  5:35 AM   Result Value Ref Range    Sodium 144 136 - 145 mmol/L    Potassium 4.1 3.5 - 5.1 mmol/L    Chloride 112 (H) 97 - 108 mmol/L    CO2 22 21 - 32 mmol/L    Anion gap 10 5 - 15 mmol/L    Glucose 143 (H) 65 - 100 mg/dL    BUN 23 (H) 6 - 20 mg/dL    Creatinine 1.27 0.70 - 1.30 mg/dL    BUN/Creatinine ratio 18 12 - 20      GFR est AA >60 >60 ml/min/1.73m2    GFR est non-AA 54 (L) >60 ml/min/1.73m2    Calcium 10.1 8.5 - 10.1 mg/dL   MAGNESIUM    Collection Time: 07/04/22  5:35 AM   Result Value Ref Range    Magnesium 1.6 1.6 - 2.4 mg/dL   GLUCOSE, POC    Collection Time: 07/04/22  7:01 AM   Result Value Ref Range    Glucose (POC) 140 (H) 65 - 117 mg/dL    Performed by Kole Hughes        Imaging:  CT HEAD WO CONT    Result Date: 7/3/2022  No acute findings. Assessment & Plan:      HTN urgency   - chronic hx of htn with systolic blood pressure kept in 140-150 range according to patient and his friend / care taker   - was elevated at home and improved initially after home metoprolol 50mg dose given.  As patient normally takes his lisinopril 40mg and norvasc 5mg at around 7pm and last dose was taken on 7/2  -Given numerous IV and oral medications in the emergency room and on recurrent monitoring decreased blood pressure overnight to 98/60 and 97/64 this morning on 7/4  -Continue monitor vitals every 4 hours  - hydralazine 10mg IV q6hr prn for sbp > 165   -Once improvement in blood pressure and heart rate will revisit home medications   - continue to hold patient metoprolol secondary to EKG now showing possible incomplete heart block  - restart home lisinopril dose but change to 20mg oral bid with first dose tonight     Dizziness  - notes chronic hx of periodic episodes usually upon awakening in AM and resolves after about 10 minutes after taking meclezine   - 7/3 dizziness didn't resolved with meclezine and blood pressure checked and 192/103 and gave AM bp med of metoprolol 50mg  and repeat noted by EMS to be in 972 systolic range but wished to be seen in ED   - soon after dizziness resolved after NS 1L bolus and meclezine 50mg dose given at 1pm     Bradycardia   - care taker / friend notes that HR generally around 60 range   - is on metoprolol 50mg oral daily at home and last dose was yesterday AM and did not receive dose pm of 7/3  - overnight had multiple drops in HR on monitor at 11:45pm dropped to 34 and additional 3:30 and 4am this AM was at 39  - EKG done at 12:33 pm noted NSR with prolonged QT and left axis deviation   - EKG done at 4:21AM soon after last bradycardia episode noted on telemetry shows NSR with incomplete left bundle branch block  Rate of 78  - 2D echo in AM   - cardiology consult in AM   - follow TSH   - monitor on tele   - hold home metoprolol dosing, current HR this AM on tele Is 79     Hypomagnesemia  - Low at 1.6 and give mag sulfate 2gm IV x 1 and start mag oxide 400mg oral daily   - follow repeat mag level in AM     ?  Sleep apnea  -Noted to have increased snoring as well as was noted by caretaker to have what appeared to be does pauses in his breathing  -No previous diagnosis of sleep apnea  -We will consult respiratory to initiate overnight pulse ox study to evaluate for possible episodes of hypoxia and apnea    Diabetes   - A1c  - takes metformin but only once daily at noon   - will monitor with RISS with accuchecks ACHS     Hx of CVA with right side weakness (2019)  - baseline walks without walker but does have walker at home   - continue home ASA, plavix and statin therapy  - CT head WO contrast done at 5:41pm shows no acute findings     Falls  - last fall about two weeks ago and at baseline does have walker at home but does not require use all the time  - fall precautions   - out of bed in chair   - PT evaluation in AM     Hx of Prostate Cancer  - unknown what follow up and treatment if any patient has had  - caretaker/friend does note decreased ability to void at times   - will obtain post void ultrasound   - will attempt discuss with family who urology he follows with  - bladder scan done 7/4 AM shows urine retention of 125cc  - UA on 7/3 was negative for UTI   - start patient on flomax 0.4 mg oral daily on 7/4 and continue to monitor post void bladder scan    Urinary Retention:  -History of prostate cancer unknown state  -Bladder ultrasound was done and showed 125 cc of urine retention and patient's caretaker does say that he has trouble with decreased stream and positive frequency  -UA was negative for UTI  -We will start and do trial of Flomax 0.4 mg daily  -Continue to monitor postvoid bladder ultrasound    Gout  - take Allopurinol 300mg oral daily and usually takes in at night     Macular Degeneration   - will take his home preservision medication and will continue     DVT prophylaxis   - lovenox     Code Status: Full Code  Patient designated decision maker is his friend / caretaker Jerrica Beasley ([phone] and second poa is tayler Lawler     Spent 55 minutes evaluating cording patient's admission to remote telemetry and expect at least 1 to 2 days of acute care stay                  Electronically signed by Julio C Lopez MD on 7/4/2022 at 9:32 AM

## 2022-07-04 NOTE — ROUTINE PROCESS
Has had two episodes of bradycardia with pulse rate dipping to 37 with immediate rebound to 72. Into room at both instances, pt awake, free of complaints and does not realize his HR has dropped. Skin warm and dry had just turned over in bed. Caregiver reports no previous issues with this problem.

## 2022-07-05 ENCOUNTER — APPOINTMENT (OUTPATIENT)
Dept: VASCULAR SURGERY | Age: 87
DRG: 305 | End: 2022-07-05
Attending: HOSPITALIST
Payer: MEDICARE

## 2022-07-05 ENCOUNTER — TRANSCRIBE ORDER (OUTPATIENT)
Dept: REGISTRATION | Age: 87
End: 2022-07-05

## 2022-07-05 VITALS
RESPIRATION RATE: 18 BRPM | BODY MASS INDEX: 28.82 KG/M2 | HEART RATE: 71 BPM | OXYGEN SATURATION: 97 % | SYSTOLIC BLOOD PRESSURE: 135 MMHG | DIASTOLIC BLOOD PRESSURE: 84 MMHG | WEIGHT: 190.2 LBS | TEMPERATURE: 97.7 F | HEIGHT: 68 IN

## 2022-07-05 DIAGNOSIS — R00.1 BRADYCARDIA: Primary | ICD-10-CM

## 2022-07-05 LAB
ANION GAP SERPL CALC-SCNC: 12 MMOL/L (ref 5–15)
ATRIAL RATE: 67 BPM
ATRIAL RATE: 78 BPM
ATRIAL RATE: 82 BPM
BASOPHILS # BLD: 0 K/UL (ref 0–0.2)
BASOPHILS NFR BLD: 1 % (ref 0–2.5)
BUN SERPL-MCNC: 26 MG/DL (ref 6–20)
BUN/CREAT SERPL: 21 (ref 12–20)
CA-I BLD-MCNC: 9.9 MG/DL (ref 8.5–10.1)
CALCULATED P AXIS, ECG09: -11 DEGREES
CALCULATED P AXIS, ECG09: 20 DEGREES
CALCULATED P AXIS, ECG09: 37 DEGREES
CALCULATED R AXIS, ECG10: -22 DEGREES
CALCULATED R AXIS, ECG10: -46 DEGREES
CALCULATED R AXIS, ECG10: 1 DEGREES
CALCULATED T AXIS, ECG11: -29 DEGREES
CALCULATED T AXIS, ECG11: 144 DEGREES
CALCULATED T AXIS, ECG11: 70 DEGREES
CHLORIDE SERPL-SCNC: 111 MMOL/L (ref 97–108)
CO2 SERPL-SCNC: 22 MMOL/L (ref 21–32)
CREAT SERPL-MCNC: 1.24 MG/DL (ref 0.7–1.3)
DIAGNOSIS, 93000: NORMAL
ECHO AO ROOT DIAM: 3.8 CM
ECHO AO ROOT INDEX: 1.9 CM/M2
ECHO AV AREA PEAK VELOCITY: 2 CM2
ECHO AV AREA VTI: 1.8 CM2
ECHO AV AREA/BSA PEAK VELOCITY: 1 CM2/M2
ECHO AV AREA/BSA VTI: 0.9 CM2/M2
ECHO AV MEAN GRADIENT: 8 MMHG
ECHO AV MEAN VELOCITY: 1.3 M/S
ECHO AV PEAK GRADIENT: 17 MMHG
ECHO AV PEAK VELOCITY: 2 M/S
ECHO AV VELOCITY RATIO: 0.5
ECHO AV VTI: 36.6 CM
ECHO LA DIAMETER INDEX: 2.25 CM/M2
ECHO LA DIAMETER: 4.5 CM
ECHO LA TO AORTIC ROOT RATIO: 1.18
ECHO LA VOL 2C: 57 ML (ref 18–58)
ECHO LA VOL 4C: 38 ML (ref 18–58)
ECHO LA VOL BP: 48 ML (ref 18–58)
ECHO LA VOL/BSA BIPLANE: 24 ML/M2 (ref 16–34)
ECHO LA VOLUME AREA LENGTH: 51 ML
ECHO LA VOLUME INDEX A2C: 29 ML/M2 (ref 16–34)
ECHO LA VOLUME INDEX A4C: 19 ML/M2 (ref 16–34)
ECHO LA VOLUME INDEX AREA LENGTH: 26 ML/M2 (ref 16–34)
ECHO LV E' LATERAL VELOCITY: 8 CM/S
ECHO LV E' SEPTAL VELOCITY: 8 CM/S
ECHO LV EDV A2C: 73 ML
ECHO LV EDV A4C: 76 ML
ECHO LV EDV BP: 75 ML (ref 67–155)
ECHO LV EDV INDEX A4C: 38 ML/M2
ECHO LV EDV INDEX BP: 38 ML/M2
ECHO LV EDV NDEX A2C: 37 ML/M2
ECHO LV EJECTION FRACTION A2C: 61 %
ECHO LV EJECTION FRACTION A4C: 67 %
ECHO LV EJECTION FRACTION BIPLANE: 63 % (ref 55–100)
ECHO LV ESV A2C: 28 ML
ECHO LV ESV A4C: 25 ML
ECHO LV ESV BP: 27 ML (ref 22–58)
ECHO LV ESV INDEX A2C: 14 ML/M2
ECHO LV ESV INDEX A4C: 13 ML/M2
ECHO LV ESV INDEX BP: 14 ML/M2
ECHO LV FRACTIONAL SHORTENING: 33 % (ref 28–44)
ECHO LV INTERNAL DIMENSION DIASTOLE INDEX: 2.7 CM/M2
ECHO LV INTERNAL DIMENSION DIASTOLIC: 5.4 CM (ref 4.2–5.9)
ECHO LV INTERNAL DIMENSION SYSTOLIC INDEX: 1.8 CM/M2
ECHO LV INTERNAL DIMENSION SYSTOLIC: 3.6 CM
ECHO LV IVSD: 1.2 CM (ref 0.6–1)
ECHO LV MASS 2D: 220.6 G (ref 88–224)
ECHO LV MASS INDEX 2D: 110.3 G/M2 (ref 49–115)
ECHO LV POSTERIOR WALL DIASTOLIC: 0.9 CM (ref 0.6–1)
ECHO LV RELATIVE WALL THICKNESS RATIO: 0.33
ECHO LVOT AREA: 4.2 CM2
ECHO LVOT AV VTI INDEX: 0.45
ECHO LVOT DIAM: 2.3 CM
ECHO LVOT MEAN GRADIENT: 2 MMHG
ECHO LVOT PEAK GRADIENT: 4 MMHG
ECHO LVOT PEAK VELOCITY: 1 M/S
ECHO LVOT STROKE VOLUME INDEX: 34.5 ML/M2
ECHO LVOT SV: 68.9 ML
ECHO LVOT VTI: 16.6 CM
ECHO MV A VELOCITY: 0.99 M/S
ECHO MV E DECELERATION TIME (DT): 207.5 MS
ECHO MV E VELOCITY: 0.9 M/S
ECHO MV E/A RATIO: 0.91
ECHO MV E/E' LATERAL: 11.25
ECHO MV E/E' RATIO (AVERAGED): 11.25
ECHO MV E/E' SEPTAL: 11.25
ECHO RV INTERNAL DIMENSION: 2.9 CM
ECHO RV TAPSE: 2.2 CM (ref 1.7–?)
ECHO TV REGURGITANT MAX VELOCITY: 2.01 M/S
ECHO TV REGURGITANT PEAK GRADIENT: 16 MMHG
EOSINOPHIL # BLD: 0.3 K/UL (ref 0–0.7)
EOSINOPHIL NFR BLD: 4 % (ref 0.9–2.9)
ERYTHROCYTE [DISTWIDTH] IN BLOOD BY AUTOMATED COUNT: 16.3 % (ref 11.5–14.5)
GLUCOSE BLD STRIP.AUTO-MCNC: 105 MG/DL (ref 65–117)
GLUCOSE BLD STRIP.AUTO-MCNC: 113 MG/DL (ref 65–117)
GLUCOSE SERPL-MCNC: 125 MG/DL (ref 65–100)
HCT VFR BLD AUTO: 34.1 % (ref 41–53)
HGB BLD-MCNC: 11 G/DL (ref 13.5–17.5)
LYMPHOCYTES # BLD: 1.4 K/UL (ref 1–4.8)
LYMPHOCYTES NFR BLD: 23 % (ref 20.5–51.1)
MAGNESIUM SERPL-MCNC: 2.1 MG/DL (ref 1.6–2.4)
MCH RBC QN AUTO: 27.2 PG (ref 31–34)
MCHC RBC AUTO-ENTMCNC: 32.2 G/DL (ref 31–36)
MCV RBC AUTO: 84.6 FL (ref 80–100)
MONOCYTES # BLD: 0.5 K/UL (ref 0.2–2.4)
MONOCYTES NFR BLD: 9 % (ref 1.7–9.3)
NEUTS SEG # BLD: 4 K/UL (ref 1.8–7.7)
NEUTS SEG NFR BLD: 63 % (ref 42–75)
NRBC # BLD: 0 K/UL
NRBC BLD-RTO: 0 PER 100 WBC
P-R INTERVAL, ECG05: 191 MS
P-R INTERVAL, ECG05: 201 MS
P-R INTERVAL, ECG05: 202 MS
PERFORMED BY, TECHID: NORMAL
PERFORMED BY, TECHID: NORMAL
PLATELET # BLD AUTO: 163 K/UL (ref 150–400)
PMV BLD AUTO: 9.1 FL (ref 6.5–11.5)
POTASSIUM SERPL-SCNC: 4.1 MMOL/L (ref 3.5–5.1)
Q-T INTERVAL, ECG07: 386 MS
Q-T INTERVAL, ECG07: 484 MS
Q-T INTERVAL, ECG07: 502 MS
QRS DURATION, ECG06: 113 MS
QRS DURATION, ECG06: 94 MS
QRS DURATION, ECG06: 97 MS
QTC CALCULATION (BEZET), ECG08: 440 MS
QTC CALCULATION (BEZET), ECG08: 530 MS
QTC CALCULATION (BEZET), ECG08: 566 MS
RBC # BLD AUTO: 4.03 M/UL (ref 4.5–5.9)
SODIUM SERPL-SCNC: 145 MMOL/L (ref 136–145)
VENTRICULAR RATE, ECG03: 67 BPM
VENTRICULAR RATE, ECG03: 78 BPM
VENTRICULAR RATE, ECG03: 82 BPM
WBC # BLD AUTO: 6.3 K/UL (ref 4.4–11.3)

## 2022-07-05 PROCEDURE — 74011250637 HC RX REV CODE- 250/637: Performed by: HOSPITALIST

## 2022-07-05 PROCEDURE — 82962 GLUCOSE BLOOD TEST: CPT

## 2022-07-05 PROCEDURE — 85025 COMPLETE CBC W/AUTO DIFF WBC: CPT

## 2022-07-05 PROCEDURE — 74011000250 HC RX REV CODE- 250: Performed by: HOSPITALIST

## 2022-07-05 PROCEDURE — 97161 PT EVAL LOW COMPLEX 20 MIN: CPT

## 2022-07-05 PROCEDURE — 83036 HEMOGLOBIN GLYCOSYLATED A1C: CPT

## 2022-07-05 PROCEDURE — 83735 ASSAY OF MAGNESIUM: CPT

## 2022-07-05 PROCEDURE — 80048 BASIC METABOLIC PNL TOTAL CA: CPT

## 2022-07-05 PROCEDURE — 93306 TTE W/DOPPLER COMPLETE: CPT

## 2022-07-05 PROCEDURE — 36415 COLL VENOUS BLD VENIPUNCTURE: CPT

## 2022-07-05 PROCEDURE — 93225 XTRNL ECG REC<48 HRS REC: CPT

## 2022-07-05 PROCEDURE — 93005 ELECTROCARDIOGRAM TRACING: CPT

## 2022-07-05 RX ORDER — LANOLIN ALCOHOL/MO/W.PET/CERES
400 CREAM (GRAM) TOPICAL DAILY
Qty: 10 TABLET | Refills: 0 | Status: SHIPPED | OUTPATIENT
Start: 2022-07-06 | End: 2022-07-16

## 2022-07-05 RX ORDER — LISINOPRIL 40 MG/1
20 TABLET ORAL 2 TIMES DAILY
Qty: 60 TABLET | Refills: 0 | Status: SHIPPED | OUTPATIENT
Start: 2022-07-05

## 2022-07-05 RX ORDER — TAMSULOSIN HYDROCHLORIDE 0.4 MG/1
0.4 CAPSULE ORAL DAILY
Qty: 30 CAPSULE | Refills: 0 | Status: SHIPPED | OUTPATIENT
Start: 2022-07-06

## 2022-07-05 RX ADMIN — SODIUM CHLORIDE, PRESERVATIVE FREE 10 ML: 5 INJECTION INTRAVENOUS at 06:00

## 2022-07-05 RX ADMIN — SODIUM CHLORIDE, PRESERVATIVE FREE 10 ML: 5 INJECTION INTRAVENOUS at 14:05

## 2022-07-05 RX ADMIN — Medication 1 CAPSULE: at 09:15

## 2022-07-05 RX ADMIN — CLOPIDOGREL BISULFATE 75 MG: 75 TABLET ORAL at 09:09

## 2022-07-05 RX ADMIN — SODIUM CHLORIDE, PRESERVATIVE FREE 10 ML: 5 INJECTION INTRAVENOUS at 14:06

## 2022-07-05 RX ADMIN — MAGNESIUM OXIDE 400 MG: 400 TABLET ORAL at 09:09

## 2022-07-05 RX ADMIN — LISINOPRIL 20 MG: 20 TABLET ORAL at 09:09

## 2022-07-05 RX ADMIN — ALLOPURINOL 300 MG: 300 TABLET ORAL at 09:09

## 2022-07-05 RX ADMIN — ASPIRIN 81 MG: 81 TABLET, COATED ORAL at 09:09

## 2022-07-05 RX ADMIN — METFORMIN HYDROCHLORIDE 500 MG: 500 TABLET ORAL at 12:16

## 2022-07-05 RX ADMIN — TAMSULOSIN HYDROCHLORIDE 0.4 MG: 0.4 CAPSULE ORAL at 09:09

## 2022-07-05 NOTE — PROGRESS NOTES
Discharge plan of care/case management plan validated with provider discharge order. Referral sent to [facility] as requested by significant other.

## 2022-07-05 NOTE — DISCHARGE SUMMARY
Physician Discharge Summary       Patient: Madai Mullen MRN: [id number]     YOB: 1935  Age: 80 y.o. Sex: male    PCP: Aliene Cheadle, MD    Allergies: Patient has no known allergies. Admit date: 7/3/2022  Admitting Provider: Jeanette Fernandes MD    Discharge date: 7/5/2022  Discharging Provider: Jeanette Fernandes MD    * Admission Diagnoses:   Dizziness [R42]  HTN (hypertension) [I10]  Bradycardia [R00.1]      * Discharge Diagnoses:    Hospital Problems as of 7/5/2022 Date Reviewed: 9/22/2020          Codes Class Noted - Resolved POA    HTN (hypertension), malignant ICD-10-CM: I10  ICD-9-CM: 401.0  7/4/2022 - Present Unknown        Bradycardia ICD-10-CM: R00.1  ICD-9-CM: 427.89  7/4/2022 - Present Unknown        HTN (hypertension) ICD-10-CM: I10  ICD-9-CM: 401.9  7/4/2022 - Present Unknown        Urinary retention ICD-10-CM: R33.9  ICD-9-CM: 788.20  7/4/2022 - Present Unknown        Falls ICD-10-CM: W19. Rochele Ethan  ICD-9-CM: E888.9  7/4/2022 - Present Unknown        * (Principal) Dizziness ICD-10-CM: R42  ICD-9-CM: 780.4  7/3/2022 - Present Unknown                * Hospital Course: As per admitting provider on 7/4:  Madai Mullen is a 80 y.o. 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 Presents from home with dizziness and noted elevated blood pressure. Patient friend/caretaker very good historian and monitors patient blood pressure and glucose levels.   She states that he normally wakes up late around 10:00 and at times can have episodes of dizziness upon awakening but usually after a dose of meclizine dizziness generally resolves after about 10 minutes of resting in bed. But yesterday on 7/3 patient's dizziness did not resolve and at that time blood pressure was checked and it was found to be 192/103 she said she gave his blood pressure medications which only blood pressure he takes in the morning is metoprolol tartrate 50 mg dose and all other blood pressure medications including lisinopril 40 mg and amlodipine 5 mg was taken at night on 7/2. She was told if diastolic blood pressure is greater than 100 to call EMS and so that is what she did and upon EMSs arrival the blood pressure was stable at 140s over 100. She states normally her blood pressure runs in the 1 40-1 50 range over 80 and that was instructed to not let blood pressure get above 081 and diastolic above 042. Patient has had falls at times and last fall was about 2 weeks ago where his legs gave out. He normally does not have to use a walker but does have 1 at home. Patient denied any chest pain shortness of breath denies any recent cold or fever chills or sick contacts. Upon arrival to the emergency room blood pressure 183/109 with a heart rate of 82 temp 97.9 and O2 sat 95% on room air. Patient was given a 1 L normal saline bolus and with continued dizziness was given a 50 mg dose of meclizine around 1 PM.  At that time was given labetalol 10 mg IV x1 but on recheck blood pressure initially improved to 155/94 but then rebounded up again to 185/110 so additional 10 mg of IV labetalol was given around 3 PM and on recheck was still elevated at 165/108 and so 30 mg of nifedipine was given at 4 PM as well as a clonidine 0.2 mg x 1. At 6 PM blood pressure was close to 155/98 and was given a 20 mg IV hydralazine dose. At that time CT head was requested and performed and found to be negative. Patient's dizziness had resolved. Initial labs today show troponin of 15 and repeat troponin this a.m. was 25 magnesium done this morning also was noted to be at 1.6 and a BNP was noted to be 743.   Patient blood pressure upon continued monitoring continue to be on downward trend at 132/82 and then by 715 was down to 106/56 and overnight at 3 AM was down to 98/60 and 97/64 this morning at 7 AM patient's caretaker had been monitoring the blood pressure when it was being taken every 15 minutes in the emergency room and she noted that and got scared about his diastolic blood pressure going as low as 56. This morning blood pressure on recheck was at 135/67 and soon after was 120/70 with heart rate improving to 83. Blood pressure medications held as normally they are tablet taken at night and had extensive conversation about adjusting medications as it was noted that even with lisinopril dose at night blood pressure in the morning still is in the 140-150. Is also noted overnight that 3-4 episodes of bradycardia was noted with a low heart rate of 35-37 and very transient and patient on evaluation by nurse was asymptomatic. There is note of patient having increased mouth breathing and snoring and caretaker did note some possible pauses in his breathing but never diagnosed with sleep apnea. Last beta-blocker dose that patient received was yesterday a.m. around 10:00 of 50 mg but she also notes that his heart rate generally stays in the 60 range even prior to beta-blocker dosage. EKG initially shows sinus rhythm and repeat EKG are this morning showed sinus rhythm but incomplete left bundle branch block. Patient was presentation was referred for admission for hypertensive urgency, bradycardic episode, dizziness    HTN urgency   - chronic hx of htn with systolic blood pressure kept in 140-150 range according to patient and his friend / care taker   - was elevated at home and improved initially after home metoprolol 50mg dose given.  As patient normally takes his lisinopril 40mg and norvasc 5mg at around 7pm and last dose was taken on 7/2  -Given numerous IV and oral medications in the emergency room and on recurrent monitoring decreased blood pressure overnight to 98/60 and 97/64 this morning on 7/4  -Patient was continued to be monitored closely and blood pressure improved on repeat checks and was started on lisinopril 20 mg oral twice daily and so far tolerated well and repeat blood pressure is at 154/81 with a heart rate of 76.   Patient did not receive his Norvasc dosing so we will restart Norvasc on discharge and discussed with patient and his caretaker about keeping log of his blood pressure checks and will also get home health to monitor closely     Dizziness  - notes chronic hx of periodic episodes usually upon awakening in AM and resolves after about 10 minutes after taking meclezine   - 7/3 dizziness didn't resolved with meclezine and blood pressure checked and 192/103 and gave AM bp med of metoprolol 50mg  and repeat noted by EMS to be in 493 systolic range but wished to be seen in ED   - soon after dizziness resolved after NS 1L bolus and meclezine 50mg dose given at 1pm   -Patient's periodic dizziness episodes should be continued to be monitored and see if improved since stopping metoprolol dosing and if that correlation is made then possible dizziness all secondary to bradycardia episodes from metoprolol.  -Holter monitor will be placed on discharge and will monitor for 7 days     Bradycardia   - care taker / friend notes that HR generally around 60 range   - is on metoprolol 50mg oral daily at home and last dose was yesterday AM and did not receive dose pm of 7/3  - overnight 7/3 into 7/4 had multiple drops in HR on monitor at 11:45pm dropped to 34 and additional 3:30 and 4am this AM was at 39  - EKG done at 12:33 pm noted NSR with prolonged QT and left axis deviation   - EKG done at 4:21AM soon after last bradycardia episode noted on telemetry shows NSR with incomplete left bundle branch block  Rate of 78  - repeat EKG am 75 shows sinus with continued prolonged QT but improved from initial.  - 2D echo performed and the report is pending but prelim shows preserved EF and mild aortic stenosis  - cardiology consult during hospitalization and recommending to hold beta-blocker and to follow-up with PCP and discuss with patient how aggressive they wish to be about further cardiac/ischemic work-up on outpatient and can refer to cardiology of choice on follow-up visit to PCP  --Was in normal range and telemetry overnight did not show any bradycardic episodes.   Instructed patient to continue monitor vital signs closely and to continue to hold metoprolol  -Discharge patient will have 7-day Holter monitor placed and for results to be sent to PCP     Hypomagnesemia  - Low at 1.6 and give mag sulfate 2gm IV x 1 and start mag oxide 400mg oral daily and repeat mag level at 2.0 and will continue oral daily dosing on discharge      ?  Sleep apnea  -Noted to have increased snoring as well as was noted by caretaker to have what appeared to be does pauses in his breathing  -No previous diagnosis of sleep apnea  -overnight pulse ox study done and no extend hypoxia episodes with lowest o2 saturation drop of  85% but average was above 94%      Diabetes   - takes metformin but only once daily at noon and was monitored with [facility] and noted stable glucose checks      Hx of CVA with right side weakness (2019)  - baseline walks without walker but does have walker at home   - continue home ASA, plavix and statin therapy  - CT head WO contrast done at 5:41pm shows no acute findings      Falls  - last fall about two weeks ago and at baseline does have walker at home but does not require use all the time  - PT evaluation in AM and patient did well with no PT concerns and no need for therapy  Via home health      Hx of Prostate Cancer  - unknown what follow up and treatment if any patient has had  - caretaker/friend does note decreased ability to void at times   - will obtain post void ultrasound   - will attempt discuss with family who urology he follows with  - bladder scan done 7/4 AM shows urine retention of 125cc  - UA on 7/3 was negative for UTI   - start patient on flomax 0.4 mg oral daily on 7/4 and continue to monitor post void bladder scan     Urinary Retention:  -History of prostate cancer unknown state  -Bladder ultrasound was done and showed 125 cc of urine retention and patient's caretaker does say that he has trouble with decreased stream and positive frequency  -UA was negative for UTI  -We will start and do trial of Flomax 0.4 mg daily and will continue on discharge as patient notes having good urine output and would recommend follow up with pcp and possible urology evaluation as needed      Gout  - take Allopurinol 300mg oral daily and usually takes in at night      Macular Degeneration   - will take his home preservision medication and will continue     * Procedures:   * No surgery found *        Consults:            CONSULTATION     REASON FOR CONSULT:  Bradycardia, HTN Urgency     REQUESTING PROVIDER:  Dr. Gibran Ha:        Chief Complaint   Patient presents with    Hypertension    Dizziness            HISTORY OF PRESENT ILLNESS:  Nanda Nguyen is a 80y.o. year-old male with past medical history significant for HTN, HLD, DM, Elevated BMI, CVA, Prostate CA, Skin Cancer (nose), Hearing loss, and chronic dizziness (well controlled with Meclizine) who presented to ED for evaluation of Dizziness and Hypertension. Patient's caregiver reports that he awoke Sunday morning c/o dizziness and took a Meclizine and went to further rest, but sx did not improve and so she took BP and it was found to be 190s/100s. EMS was called at that time. Upon EMS arrival SBP 140s, but then it is reported SBP high 200s en route, but arrival to ED BP   183/109 with HR 82. EKG negative for ischemia. Workup in ED revealed HS Trop trends 15-25-21, , Mag 1.7, CXR negative.   He was referred for admission for further monitoring and treatment given the amount of medication required to control his BP. Yesterday it was noted HR dropped transiently and shortly into 30s while intermittently sleeping and patient was not symptomatic. He has good HR variability and chronotropic response noted on Telemetry review, no further episodes that low. Caregiver also reports patient has had intermittent dyspnea with exertion for the past month, though it was not effected quality of life as he is sedentary majority of the time. He is sx free this am and with no complaints. He is questioning discharge home.      Records from hospital admission course thus far reviewed.       Telemetry Review: SR, prolonged qt interval     Case discussed with collaborating physician Dr. Joanie Bolivar and our impression and recommendations are as follows:   1. Bradycardia:  Transient and short and asymptomatic. Likely related to PO BB and Multiple IV doses of BB administered to control HTN Urgency. Good HR variability and chronotropic response noted on Telemetry review, and no further episodes as low as 2 noted in 30s. Would recommend holding BB at discharge and placing holter monitor to further evaluate and trend. Patient can follow-up with PCP for results per their request and decide if they want further cardiology input  2. Dyspnea:  Noted with exertion over the past month per caregiver report. Transient in nature and short duration. ? Related to variable BP. ACS ruled out with HS Trop and EKG trends. Will obtain TTE today to evaluate structure/function. CXR negative upon arrival to ED. No hypoxia noted. Pending TTE results could consider further workup to include ischemic evaluation pending family/patient choice. For now would recommend more consistent control of HR/BP prior to further testing. 3. HTN Urgency:  BP is now well controlled this am, however received significant amount of medication during ED visit. Hold home BB and all further AV reggie blocking agents at this time.  Can continue home Lisinopril and Amlodipine. Goal BP is around 150/90 for patient as caregiver reports he gets symptomatic otherwise and given senescent changes this is appropriate. Please avoid treating Systolic Hypertension as mortality and cardiovascular risk rate is increased for DBP <60. Should have close follow-up with PCP at discharge for further titration of medications as indicated. Can consider further workup of secondary causes in OP setting if tx continues to be difficult. 4. Hypomagnesemia: Replete to goal >2. Continue telemetry monitoring. Monitor potassium levels closely. Repeat labs as indicated. 5. Prolonged QT interval:  Avoid all medications (including Zofran) that could prolong QT further. It has improved since admission. Continue to monitor. No arrhythmias noted/associated. Continue telemetry. 6. Dizziness:  Head CT negative for acute pathology and just showing chronic changes. Usually well controlled with Meclizine. Can hold off on further workup at this time. Continue home prn medication.        Thank you for involving us in the care of this patient. Please do not hesitate to call if additional questions arise. If after hours please call [phone]       Vitals Last 24 Hours:  Patient Vitals for the past 24 hrs:   Temp Pulse Resp BP SpO2   07/05/22 1209 97.7 °F (36.5 °C) 71 18 135/84 97 %   07/05/22 1006 -- -- -- -- 98 %   07/05/22 0723 97.7 °F (36.5 °C) 73 18 128/70 98 %   07/05/22 0326 98.5 °F (36.9 °C) 73 20 134/68 98 %   07/04/22 2341 97.7 °F (36.5 °C) 76 20 125/75 97 %   07/04/22 1954 -- 74 -- -- --   07/04/22 1934 97.1 °F (36.2 °C) 76 20 123/70 96 %   07/04/22 1614 98.3 °F (36.8 °C) 78 18 127/75 97 %   07/04/22 1600 -- 78 -- -- --        Discharge Exam:  General: Alert, cooperative, no distress. Head:  Normocephalic, without obvious abnormality, atraumatic. Eyes:  Conjunctivae/corneas clear. Pupils equal, round, reactive to light. Extraocular movements intact.   Lungs:  Clear to auscultation bilaterally, no wheezes, crackles  Chest wall: No tenderness or deformity. Heart:  Regular rate and rhythm, S1, S2 normal, no murmur, click, rub, or gallop. Abdomen:      Soft, non-tender. Bowel sounds normal. No masses. No organomegaly. Back:  No spine tenderness to palpation  Extremities:   Extremities normal, atraumatic, no cyanosis or edema. Pulses: Symmetric all extremities. Skin:   Skin color, texture, turgor normal.   Lymph nodes: Cervical nodes normal.  Neurologic: CNII-XII intact. Normal strength, sensation, and reflexes throughout.     Labs:  Recent Results (from the past 24 hour(s))   GLUCOSE, POC    Collection Time: 07/04/22  3:48 PM   Result Value Ref Range    Glucose (POC) 131 (H) 65 - 117 mg/dL    Performed by [facility]    GLUCOSE, POC    Collection Time: 07/04/22  8:45 PM   Result Value Ref Range    Glucose (POC) 181 (H) 65 - 117 mg/dL    Performed by Fredis Packab    Collection Time: 07/05/22  5:20 AM   Result Value Ref Range    Magnesium 2.1 1.6 - 2.4 mg/dL   METABOLIC PANEL, BASIC    Collection Time: 07/05/22  5:20 AM   Result Value Ref Range    Sodium 145 136 - 145 mmol/L    Potassium 4.1 3.5 - 5.1 mmol/L    Chloride 111 (H) 97 - 108 mmol/L    CO2 22 21 - 32 mmol/L    Anion gap 12 5 - 15 mmol/L    Glucose 125 (H) 65 - 100 mg/dL    BUN 26 (H) 6 - 20 mg/dL    Creatinine 1.24 0.70 - 1.30 mg/dL    BUN/Creatinine ratio 21 (H) 12 - 20      GFR est AA >60 >60 ml/min/1.73m2    GFR est non-AA 55 (L) >60 ml/min/1.73m2    Calcium 9.9 8.5 - 10.1 mg/dL   CBC WITH AUTOMATED DIFF    Collection Time: 07/05/22  5:20 AM   Result Value Ref Range    WBC 6.3 4.4 - 11.3 K/uL    RBC 4.03 (L) 4.50 - 5.90 M/uL    HGB 11.0 (L) 13.5 - 17.5 g/dL    HCT 34.1 (L) 41 - 53 %    MCV 84.6 80 - 100 FL    MCH 27.2 (L) 31 - 34 PG    MCHC 32.2 31.0 - 36.0 g/dL    RDW 16.3 (H) 11.5 - 14.5 %    PLATELET 022 411 - 308 K/uL    MPV 9.1 6.5 - 11.5 FL    NRBC 0.0  WBC    ABSOLUTE NRBC 0.00 K/uL NEUTROPHILS 63 42 - 75 %    LYMPHOCYTES 23 20.5 - 51.1 %    MONOCYTES 9 1.7 - 9.3 %    EOSINOPHILS 4 (H) 0.9 - 2.9 %    BASOPHILS 1 0.0 - 2.5 %    ABS. NEUTROPHILS 4.0 1.8 - 7.7 K/UL    ABS. LYMPHOCYTES 1.4 1.0 - 4.8 K/UL    ABS. MONOCYTES 0.5 0.2 - 2.4 K/UL    ABS. EOSINOPHILS 0.3 0.0 - 0.7 K/UL    ABS.  BASOPHILS 0.0 0.0 - 0.2 K/UL   GLUCOSE, POC    Collection Time: 07/05/22  7:07 AM   Result Value Ref Range    Glucose (POC) 113 65 - 117 mg/dL    Performed by Esme Bonner    EKG, 12 LEAD, SUBSEQUENT    Collection Time: 07/05/22  8:25 AM   Result Value Ref Range    Ventricular Rate 67 BPM    Atrial Rate 67 BPM    P-R Interval 201 ms    QRS Duration 94 ms    Q-T Interval 502 ms    QTC Calculation (Bezet) 530 ms    Calculated P Axis 37 degrees    Calculated R Axis 1 degrees    Calculated T Axis -29 degrees    Diagnosis       Sinus rhythm  Posterior infarct, old  Borderline T abnormalities, diffuse leads  Prolonged QT interval    Confirmed by Jostin Tabares ([id number]) on 7/5/2022 11:31:34 AM     ECHO ADULT COMPLETE    Collection Time: 07/05/22 11:00 AM   Result Value Ref Range    IVSd 1.2 (A) 0.6 - 1.0 cm    LVIDd 5.4 4.2 - 5.9 cm    LVIDs 3.6 cm    LVOT Diameter 2.3 cm    LVPWd 0.9 0.6 - 1.0 cm    EF BP 63 55 - 100 %    LV Ejection Fraction A2C 61 %    LV Ejection Fraction A4C 67 %    LV EDV A2C 73 mL    LV EDV A4C 76 mL    LV EDV BP 75 67 - 155 mL    LV ESV A2C 28 mL    LV ESV A4C 25 mL    LV ESV BP 27 22 - 58 mL    LVOT Peak Gradient 4 mmHg    LVOT Mean Gradient 2 mmHg    LVOT SV 68.9 ml    LVOT Peak Velocity 1.0 m/s    LVOT VTI 16.6 cm    RVIDd 2.9 cm    LA Diameter 4.5 cm    LA Volume A/L 51 mL    LA Volume 2C 57 18 - 58 mL    LA Volume 4C 38 18 - 58 mL    LA Volume BP 48 18 - 58 mL    AV Area by Peak Velocity 2.0 cm2    AV Area by VTI 1.8 cm2    AV Peak Gradient 17 mmHg    AV Mean Gradient 8 mmHg    AV Peak Velocity 2.0 m/s    AV Mean Velocity 1.3 m/s    AV VTI 36.6 cm    MV A Velocity 0.99 m/s    MV E Wave Deceleration Time 207.5 ms    MV E Velocity 0.90 m/s    LV E' Lateral Velocity 8 cm/s    LV E' Septal Velocity 8 cm/s    TAPSE 2.2 1.7 cm    TR Peak Gradient 16 mmHg    TR Max Velocity 2.01 m/s    Aortic Root 3.8 cm    Fractional Shortening 2D 33 28 - 44 %    LV ESV Index BP 14 mL/m2    LV EDV Index BP 38 mL/m2    LV ESV Index A4C 13 mL/m2    LV EDV Index A4C 38 mL/m2    LV ESV Index A2C 14 mL/m2    LV EDV Index A2C 37 mL/m2    LVIDd Index 2.70 cm/m2    LVIDs Index 1.80 cm/m2    LV RWT Ratio 0.33     LV Mass 2D 220.6 88 - 224 g    LV Mass 2D Index 110.3 49 - 115 g/m2    MV E/A 0.91     E/E' Ratio (Averaged) 11.25     E/E' Lateral 11.25     E/E' Septal 11.25     LA Volume Index BP 24 16 - 34 ml/m2    LA Volume Index A/L 26 16 - 34 mL/m2    LVOT Stroke Volume Index 34.5 mL/m2    LVOT Area 4.2 cm2    LA Volume Index 2C 29 16 - 34 mL/m2    LA Volume Index 4C 19 16 - 34 mL/m2    LA Size Index 2.25 cm/m2    LA/AO Root Ratio 1.18     Ao Root Index 1.90 cm/m2    AV Velocity Ratio 0.50     LVOT:AV VTI Index 0.45     CHUCK/BSA VTI 0.9 cm2/m2    CHUCK/BSA Peak Velocity 1.0 cm2/m2   GLUCOSE, POC    Collection Time: 07/05/22 11:50 AM   Result Value Ref Range    Glucose (POC) 105 65 - 117 mg/dL    Performed by [facility]          Imaging:  XR CHEST PA LAT    Result Date: 7/4/2022  No acute process. CT HEAD WO CONT    Result Date: 7/3/2022  No acute findings. * Discharge Condition: improved  * Disposition: Home w/Family, [facility] RN  Home health to monitor vitals and keep  Log and monitor closely episodes of dizzines and also monitor any urine output and monitor if any retention issues since starting new medication of flomax       Discharge Medications:  Current Discharge Medication List      START taking these medications    Details   magnesium oxide (MAG-OX) 400 mg tablet Take 1 Tablet by mouth daily for 10 days.   Qty: 10 Tablet, Refills: 0  Start date: 7/6/2022, End date: 7/16/2022      vit C,L-Oc-jhwzu-lutein-zeaxan (PRESERVISION AREDS-2) 250-90-40-1 mg cap capsule Take 1 Capsule by mouth two (2) times daily (with meals). Qty: 60 Capsule, Refills: 0  Start date: 7/5/2022         CONTINUE these medications which have CHANGED    Details   lisinopriL (PRINIVIL, ZESTRIL) 40 mg tablet Take 0.5 Tablets by mouth two (2) times a day. 7pm  Qty: 60 Tablet, Refills: 0  Start date: 7/5/2022         CONTINUE these medications which have NOT CHANGED    Details   atorvastatin (LIPITOR) 40 mg tablet Take 40 mg by mouth every evening. At 7pm      amLODIPine (NORVASC) 5 mg tablet Take 5 mg by mouth every evening. 7pm      allopurinoL (ZYLOPRIM) 300 mg tablet Take 300 mg by mouth every evening. Usually around 7pm      metFORMIN (GLUCOPHAGE) 500 mg tablet Take 500 mg by mouth daily (with lunch). clopidogreL (PLAVIX) 75 mg tab Take 75 mg by mouth. aspirin delayed-release 81 mg tablet Take  by mouth daily. acetaminophen (TYLENOL) 325 mg tablet Take 2 Tabs by mouth every four (4) hours as needed for Pain. Qty: 20 Tab, Refills: 0         STOP taking these medications       metoprolol tartrate (LOPRESSOR) 50 mg tablet Comments:   Reason for Stopping:                 * Follow-up Care/Patient Instructions: Activity: Activity as tolerated  Diet: Cardiac Diet      Follow-up Information     Follow up With Specialties Details Why Contact Info    Doreen Duvall MD Family Medicine Schedule an appointment as soon as possible for a visit in 3 days  . [address]  [phone]          Spent 35  minutes evaluting and coordinating patient care of which >50% was spent coordinating and counseling.        Signed:  Marnie Browning MD  7/5/2022  2:55 PM

## 2022-07-05 NOTE — PROGRESS NOTES
Problem: Falls - Risk of  Goal: *Absence of Falls  Description: Document Malinda Mccarthy Fall Risk and appropriate interventions in the flowsheet.   Outcome: Progressing Towards Goal  Note: Fall Risk Interventions:  Mobility Interventions: Utilize walker, cane, or other assistive device         Medication Interventions: Teach patient to arise slowly    Elimination Interventions: Bed/chair exit alarm,Patient to call for help with toileting needs,Call light in reach    History of Falls Interventions: Room close to nurse's station         Problem: Patient Education: Go to Patient Education Activity  Goal: Patient/Family Education  Outcome: Progressing Towards Goal     Problem: Hypertension  Goal: *Blood pressure within specified parameters  Outcome: Progressing Towards Goal  Goal: *Fluid volume balance  Outcome: Progressing Towards Goal

## 2022-07-05 NOTE — CONSULTS
CONSULTATION    REASON FOR CONSULT:  Bradycardia, HTN Urgency    REQUESTING PROVIDER:  Dr. Padilla Haywood:    Chief Complaint   Patient presents with    Hypertension    Dizziness         HISTORY OF PRESENT ILLNESS:  Nallely Bar is a 80y.o. year-old male with past medical history significant for HTN, HLD, DM, Elevated BMI, CVA, Prostate CA, Skin Cancer (nose), Hearing loss, and chronic dizziness (well controlled with Meclizine) who presented to ED for evaluation of Dizziness and Hypertension. Patient's caregiver reports that he awoke Sunday morning c/o dizziness and took a Meclizine and went to further rest, but sx did not improve and so she took BP and it was found to be 190s/100s. EMS was called at that time. Upon EMS arrival SBP 140s, but then it is reported SBP high 200s en route, but arrival to ED BP   183/109 with HR 82. EKG negative for ischemia. Workup in ED revealed HS Trop trends 15-25-21, , Mag 1.7, CXR negative. He was referred for admission for further monitoring and treatment given the amount of medication required to control his BP. Yesterday it was noted HR dropped transiently and shortly into 30s while intermittently sleeping and patient was not symptomatic. He has good HR variability and chronotropic response noted on Telemetry review, no further episodes that low. Caregiver also reports patient has had intermittent dyspnea with exertion for the past month, though it was not effected quality of life as he is sedentary majority of the time. He is sx free this am and with no complaints. He is questioning discharge home. Records from hospital admission course thus far reviewed.       Telemetry Review: SR, prolonged qt interval      PAST MEDICAL HISTORY:    Past Medical History:   Diagnosis Date    [facility]A [facility]cerebral vascular accident) ([facility] [address] 11/2019    hx of right side weakness    Diabetes ([facility] [address] [facility]     Ear problems 7/22/2020    Gout     Hearing loss 7/22/2020  HTN (hypertension)     Kidney stones     Macular degeneration     Pro[facility] cancer ([facility] [address] [facility]     Skin cancer        PAST SURGICAL HISTORY: No past surgical history on file. ALLERGIES:  No Known Allergies    FAMILY HISTORY:    Family History   Problem Relation Age of Onset    Heart Attack Mother     Stroke Father        SOCIAL HISTORY:    Social History     Tobacco Use    Smoking status: Never Smoker    Smokeless tobacco: Never Used   Substance Use Topics    Alcohol use: Not Currently    Drug use: Never         HOME MEDICATIONS:    Prior to Admission Medications   Prescriptions Last Dose Informant Patient Reported? Taking?   acetaminophen (TYLENOL) 325 mg tablet 6/26/2022 at Unknown time  No Yes   Sig: Take 2 Tabs by mouth every four (4) hours as needed for Pain. allopurinoL (ZYLOPRIM) 300 mg tablet 7/2/2022 at 1000  Yes Yes   Sig: Take 300 mg by mouth every evening. Usually around 7pm   amLODIPine (NORVASC) 5 mg tablet 7/2/2022 at 1000  Yes Yes   Sig: Take 5 mg by mouth every evening. 7pm   aspirin delayed-release 81 mg tablet 7/2/2022 at 1000  Yes Yes   Sig: Take  by mouth daily. atorvastatin (LIPITOR) 40 mg tablet 7/2/2022 at 1000  Yes Yes   Sig: Take 40 mg by mouth every evening. At 7pm   clopidogreL (PLAVIX) 75 mg tab 7/2/2022 at 1000  Yes Yes   Sig: Take 75 mg by mouth.   lisinopriL (PRINIVIL, ZESTRIL) 40 mg tablet 7/2/2022 at 1000  Yes Yes   Sig: Take 40 mg by mouth every evening. 7pm   metFORMIN (GLUCOPHAGE) 500 mg tablet 7/2/2022 at 1000  Yes Yes   Sig: Take 500 mg by mouth daily (with lunch). metoprolol tartrate (LOPRESSOR) 50 mg tablet 7/2/2022 at 1000  Yes Yes   Sig: Take 50 mg by mouth Every morning. Facility-Administered Medications: None       REVIEW OF SYSTEMS:  Complete review of systems performed, pertinents noted above, all other systems are negative.     Patient Vitals for the past 24 hrs:   Temp Pulse Resp BP SpO2   07/05/22 1006 -- -- -- -- 98 %   07/05/22 0723 97.7 °F (36.5 °C) 73 18 128/70 98 %   07/05/22 0326 98.5 °F (36.9 °C) 73 20 134/68 98 %   07/04/22 2341 97.7 °F (36.5 °C) 76 20 125/75 97 %   07/04/22 1954 -- 74 -- -- --   07/04/22 1934 97.1 °F (36.2 °C) 76 20 123/70 96 %   07/04/22 1614 98.3 °F (36.8 °C) 78 18 127/75 97 %   07/04/22 1600 -- 78 -- -- --   07/04/22 1414 -- -- -- 120/70 --   07/04/22 1413 -- -- -- 123/74 --   07/04/22 1412 -- 92 -- 125/71 97 %   07/04/22 1209 96.9 °F (36.1 °C) 80 18 135/67 96 %   07/04/22 1200 -- 80 -- -- --   07/04/22 1103 -- -- -- -- 96 %       PHYSICAL EXAMINATION:    General: Well nourished chronically ill appearing male lying in bed, NAD, A&O  HEENT: Normocephalic, PERRL, no drainage, hard of hearing, glasses on  Neck: Supple, Trachea midline, No JVD  RESP: CTA bilaterally. + Symmetrical chest movement. No SOB or distress. On RA  Cardiovascular: RRR no MRG  PVS: No rubor, cyanosis, no edema, Radial, DP, PT pulses equal bilaterally  ABD: flat, soft, NT, Normoactive BS  Derm: Warm/Dry/Intact with no lesions, normal turgor  Neuro: A&O PPTS, cranial nerves II- XII grossly intact via interaction with patient. No focal deficits  PSYCH: No anxiety or agitation      Electrocardiogram performed earlier reviewed, it shows SR, prolonged QT interval, QTc 566, left axis deviation, nonspecific twave changes inferior and lateral leads    (7/4 EKG showed lengthened QRS not as incomplete LBBB, but this has resolved)    Recent labs results and imaging reviewed.       Recent Results (from the past 24 hour(s))   GLUCOSE, POC    Collection Time: 07/04/22 11:46 AM   Result Value Ref Range    Glucose (POC) 148 (H) 65 - 117 mg/dL    Performed by Juan Anna    TROPONIN-HIGH SENSITIVITY    Collection Time: 07/04/22  2:15 PM   Result Value Ref Range    Troponin-High Sensitivity 21 0 - 76 ng/L   GLUCOSE, POC    Collection Time: 07/04/22  3:48 PM   Result Value Ref Range    Glucose (POC) 131 (H) 65 - 117 mg/dL    Performed by Joy Eduardo POC Collection Time: 07/04/22  8:45 PM   Result Value Ref Range    Glucose (POC) 181 (H) 65 - 117 mg/dL    Performed by Cheryl Francois    MAGNESIUM    Collection Time: 07/05/22  5:20 AM   Result Value Ref Range    Magnesium 2.1 1.6 - 2.4 mg/dL   METABOLIC PANEL, BASIC    Collection Time: 07/05/22  5:20 AM   Result Value Ref Range    Sodium 145 136 - 145 mmol/L    Potassium 4.1 3.5 - 5.1 mmol/L    Chloride 111 (H) 97 - 108 mmol/L    CO2 22 21 - 32 mmol/L    Anion gap 12 5 - 15 mmol/L    Glucose 125 (H) 65 - 100 mg/dL    BUN 26 (H) 6 - 20 mg/dL    Creatinine 1.24 0.70 - 1.30 mg/dL    BUN/Creatinine ratio 21 (H) 12 - 20      GFR est AA >60 >60 ml/min/1.73m2    GFR est non-AA 55 (L) >60 ml/min/1.73m2    Calcium 9.9 8.5 - 10.1 mg/dL   CBC WITH AUTOMATED DIFF    Collection Time: 07/05/22  5:20 AM   Result Value Ref Range    WBC 6.3 4.4 - 11.3 K/uL    RBC 4.03 (L) 4.50 - 5.90 M/uL    HGB 11.0 (L) 13.5 - 17.5 g/dL    HCT 34.1 (L) 41 - 53 %    MCV 84.6 80 - 100 FL    MCH 27.2 (L) 31 - 34 PG    MCHC 32.2 31.0 - 36.0 g/dL    RDW 16.3 (H) 11.5 - 14.5 %    PLATELET 635 775 - 710 K/uL    MPV 9.1 6.5 - 11.5 FL    NRBC 0.0  WBC    ABSOLUTE NRBC 0.00 K/uL    NEUTROPHILS 63 42 - 75 %    LYMPHOCYTES 23 20.5 - 51.1 %    MONOCYTES 9 1.7 - 9.3 %    EOSINOPHILS 4 (H) 0.9 - 2.9 %    BASOPHILS 1 0.0 - 2.5 %    ABS. NEUTROPHILS 4.0 1.8 - 7.7 K/UL    ABS. LYMPHOCYTES 1.4 1.0 - 4.8 K/UL    ABS. MONOCYTES 0.5 0.2 - 2.4 K/UL    ABS. EOSINOPHILS 0.3 0.0 - 0.7 K/UL    ABS.  BASOPHILS 0.0 0.0 - 0.2 K/UL   GLUCOSE, POC    Collection Time: 07/05/22  7:07 AM   Result Value Ref Range    Glucose (POC) 113 65 - 117 mg/dL    Performed by Kole Hughes    EKG, 12 LEAD, SUBSEQUENT    Collection Time: 07/05/22  8:25 AM   Result Value Ref Range    Ventricular Rate 67 BPM    Atrial Rate 67 BPM    P-R Interval 201 ms    QRS Duration 94 ms    Q-T Interval 502 ms    QTC Calculation (Bezet) 530 ms    Calculated P Axis 37 degrees    Calculated R Axis 1 degrees Calculated T Axis -29 degrees    Diagnosis       Sinus rhythm  Posterior infarct, old  Borderline T abnormalities, diffuse leads  Prolonged QT interval         XR Results (maximum last 3): Results from [place] encounter on 07/03/22    XR CHEST PA LAT    Impression  No acute process. Results from [place] encounter on 08/25/21    XR CHEST PORT    Impression  Possible hydrostatic edema. Mild bibasilar atelectasis. CT Results (maximum last 3): Results from [place] encounter on 07/03/22    CT HEAD WO CONT    Impression  No acute findings. Results from [place] encounter on 10/22/21    CT HEAD WO CONT    Impression  Evidence for advanced nonacute end vessel occlusive change. Central cerebral arteriosclerosis. No intracranial hemorrhage. Results from [place] encounter on 08/25/21    CT HEAD WO CONT    Impression  1. No acute intracranial abnormality. 2.  Chronic findings as described above. MRI Results (maximum last 3): No results found for this or any previous visit. Nuclear Medicine Results (maximum last 3): No results found for this or any previous visit. US Results (maximum last 3): No results found for this or any previous visit. VAS/US Results (maximum last 3): No results found for this or any previous visit.         Current Facility-Administered Medications:     glucose chewable tablet 16 g, 4 Tablet, Oral, PRN, Uche Perez MD    dextrose (D50W) injection syrg 12.5-25 g, 25-50 mL, IntraVENous, PRN, Uche Perez MD    glucagon (GLUCAGEN) injection 1 mg, 1 mg, IntraMUSCular, PRN, Uche Perez MD    insulin lispro (HUMALOG) injection, , SubCUTAneous, AC&HS, Uche Perez MD, 2 Units at 07/04/22 1222    magnesium oxide (MAG-OX) tablet 400 mg, 400 mg, Oral, DAILY, Uche Perez MD, 400 mg at 07/05/22 0909    allopurinoL (ZYLOPRIM) tablet 300 mg, 300 mg, Oral, DAILY, Chris Viri Cerda MD, 300 mg at 07/05/22 0909    aspirin delayed-release tablet 81 mg, 81 mg, Oral, DAILY, Uche Perez MD, 81 mg at 07/05/22 0909    clopidogreL (PLAVIX) tablet 75 mg, 75 mg, Oral, DAILY, Uche Perez MD, 75 mg at 07/05/22 0909    vit C,A-Pr-jjkgg-lutein-zeaxan (PRESERVISION AREDS-2) capsule 1 Capsule, 1 Capsule, Oral, BID WITH MEALS, Uche Perez MD, 1 Capsule at 07/05/22 0915    tamsulosin (FLOMAX) capsule 0.4 mg, 0.4 mg, Oral, DAILY, Uche Perez MD, 0.4 mg at 07/05/22 0909    metFORMIN (GLUCOPHAGE) tablet 500 mg, 500 mg, Oral, DAILY WITH LUNCH, Uche Perez MD    lisinopriL (PRINIVIL, ZESTRIL) tablet 20 mg, 20 mg, Oral, BID, Uche Perez MD, 20 mg at 07/05/22 0909    sodium chloride (NS) flush 5-40 mL, 5-40 mL, IntraVENous, Q8H, Uche Perez MD, 10 mL at 07/05/22 0600    sodium chloride (NS) flush 5-40 mL, 5-40 mL, IntraVENous, PRN, Uche Perez MD, 10 mL at 07/04/22 1411    acetaminophen (TYLENOL) tablet 650 mg, 650 mg, Oral, Q6H PRN **OR** acetaminophen (TYLENOL) suppository 650 mg, 650 mg, Rectal, Q6H PRN, Uche Perez MD    polyethylene glycol (MIRALAX) packet 17 g, 17 g, Oral, DAILY PRN, Uche Perez MD    ondansetron (ZOFRAN ODT) tablet 4 mg, 4 mg, Oral, Q8H PRN **OR** ondansetron (ZOFRAN) injection 4 mg, 4 mg, IntraVENous, Q6H PRN, Uche Perez MD    enoxaparin (LOVENOX) injection 40 mg, 40 mg, SubCUTAneous, DAILY, Uche Perez MD    hydrALAZINE (APRESOLINE) 20 mg/mL injection 10 mg, 10 mg, IntraVENous, Q6H PRN, Viri Perez MD          Case discussed with collaborating physician Dr. Sanjay Diaz and our impression and recommendations are as follows:   1. Bradycardia:  Transient and short and asymptomatic. Likely related to PO BB and Multiple IV doses of BB administered to control HTN Urgency.    Good HR variability and chronotropic response noted on Telemetry review, and no further episodes as low as 2 noted in 35s. Would recommend holding BB at discharge and placing holter monitor to further evaluate and trend. Patient can follow-up with PCP for results per their request and decide if they want further cardiology input  2. Dyspnea:  Noted with exertion over the past month per caregiver report. Transient in nature and short duration. ? Related to variable BP. ACS ruled out with HS Trop and EKG trends. Will obtain TTE today to evaluate structure/function. CXR negative upon arrival to ED. No hypoxia noted. Pending TTE results could consider further workup to include ischemic evaluation pending family/patient choice. For now would recommend more consistent control of HR/BP prior to further testing. 3. HTN Urgency:  BP is now well controlled this am, however received significant amount of medication during ED visit. Hold home BB and all further AV reggie blocking agents at this time. Can continue home Lisinopril and Amlodipine. Goal BP is around 150/90 for patient as caregiver reports he gets symptomatic otherwise and given senescent changes this is appropriate. Please avoid treating Systolic Hypertension as mortality and cardiovascular risk rate is increased for DBP <60. Should have close follow-up with PCP at discharge for further titration of medications as indicated. Can consider further workup of secondary causes in OP setting if tx continues to be difficult. 4. Hypomagnesemia: Replete to goal >2. Continue telemetry monitoring. Monitor potassium levels closely. Repeat labs as indicated. 5. Prolonged QT interval:  Avoid all medications (including Zofran) that could prolong QT further. It has improved since admission. Continue to monitor. No arrhythmias noted/associated. Continue telemetry. 6. Dizziness:  Head CT negative for acute pathology and just showing chronic changes. Usually well controlled with Meclizine. Can hold off on further workup at this time.   Continue home prn medication. Thank you for involving us in the care of this patient. Please do not hesitate to call if additional questions arise.  If after hours please call [phone]

## 2022-07-05 NOTE — ACP (ADVANCE CARE PLANNING)
Advance Care Planning   Healthcare Decision Maker:   Patient stated that his significant other, Jarod Cunha, is the person who he wants to be his primary health care decision maker. Stated that he already has a healthcare POA completed. Ms. Bette Hyde stated that she was going to bring a copy of the POA.       Click here to complete [address] including selection of the Healthcare Decision Maker Relationship (ie \"Primary\")

## 2022-07-05 NOTE — PROGRESS NOTES
Reason for Admission:  Dizziness, HTN, Bradycardia                     RUR Score:   10-  Low risk                  Plan for utilizing home health:  yes        PCP: First and Last name:  Shirley Haas MD     Name of Practice: [facility]   Are you a current patient: Yes/No: yes   Approximate date of last visit: 4/2022   Can you participate in a virtual visit with your PCP: With assistance                    Current Advanced Directive/Advance Care Plan: Full Code      Healthcare Decision Maker:   Click here to complete [address] including selection of the Healthcare Decision Maker Relationship (ie \"Primary\")             Primary Decision Maker: Khoi Mahmood Partner - [phone]                  Transition of Care Plan:   Patient lives in his home with his significant other. She takes him to all appointments. Patient will be discharged home with home health nusing to monitor BP and medications. PT did not recommend home health PT servies after evaluation.

## 2022-07-05 NOTE — PROGRESS NOTES
PHYSICAL THERAPY EVALUATION/DISCHARGE  Patient: Nicho Mccormack (89 y.o. male)  Date: 7/5/2022  Primary Diagnosis: Dizziness [R42]  HTN (hypertension) [I10]  Bradycardia [R00.1]       Precautions: none         ASSESSMENT  Based on the objective data described below, the patient presents with a history of dizziness and HTN. Upon entering the room the patient was A&Ox4 and along with caregiver answered all questions. He was independent with all transfers and demonstrated good sitting and standing balance. He was able to ambulate with RW through the hospital for >100 ft without any LOB or complaints of imbalances. He uses RW and rollator in the home and community currently. He has good strength and is safe to return to home with assistance from caregiver for daily tasks and supervision. Other factors to consider for discharge: none     Further skilled acute physical therapy is not indicated at this time. PLAN :  Recommendation for discharge: (in order for the patient to meet his/her long term goals)  No skilled physical therapy/ follow up rehabilitation needs identified at this time. This discharge recommendation:  Has been made in collaboration with the attending provider and/or case management    IF patient discharges home will need the following DME: Patient has RW and shower chair for use at home. SUBJECTIVE:   Patient stated I am doing pretty good today and do not have any pain. I am ready to go home.     OBJECTIVE DATA SUMMARY:   HISTORY:    Past Medical History:   Diagnosis Date    CVA [facility]cerebral vascular [facility]) [facility] [address] 11/2019    hx of right side weakness    Diabetes ([address]     Ear problems 7/22/2020    Gout     Hearing loss 7/22/2020    HTN (hypertension)     Kidney stones     Macular degeneration     Prostate cancer ([address]     Skin cancer    No past surgical history on file. Prior level of function: Modified independent with use of RW and shower chair.    Personal factors and/or comorbidities impacting plan of care: decreased hearing    Home Situation  Home Environment: Private residence  # Steps to Enter: 4  Rails to Enter: Yes  Hand Rails : Bilateral  Wheelchair Ramp: No  One/Two Story Residence: One story  Living Alone: No  Support Systems: Spouse/Significant Other,Parent(s)  Patient Expects to be Discharged to[de-identified] Home with home health  Current DME Used/Available at Home: None  Tub or Shower Type: Tub/Shower combination    EXAMINATION/PRESENTATION/DECISION MAKING:   Critical Behavior:  Neurologic State: Alert  Orientation Level: Oriented X4  Cognition: Appropriate for age attention/concentration     Hearing: Auditory  Auditory Impairment: Hearing aid(s),Deaf (hearing aid to left ear,deaf in right)  Range Of Motion:  AROM: Within functional limits  Strength:    Strength: Within functional limits  Coordination:  Coordination: Within functional limits  Functional Mobility:  Bed Mobility:  Rolling: Independent  Supine to Sit: Independent  Sit to Supine: Independent  Scooting: Independent  Transfers:  Sit to Stand: Independent  Stand to Sit: Independent  Balance:   Sitting: Intact  Standing: Intact  Ambulation/Gait Training:  Gait Description (WDL): Within defined limits (Walks with RW)   Stairs:  Stairs - Level of Assistance: Modified independent (use of bilateral railings)        Treatment Session:  Patient was able to ambulate > 100 ft and demonstrate balance. Pt transferred to chair at the end of session and was left sitting with call bell in reach.         Physical Therapy Evaluation Charge Determination   History Examination Presentation Decision-Making   LOW Complexity : Zero comorbidities / personal factors that will impact the outcome / POC LOW Complexity : 1-2 Standardized tests and measures addressing body structure, function, activity limitation and / or participation in recreation  LOW Complexity : Stable, uncomplicated        Based on the above components, the patient evaluation is determined to be of the following complexity level: LOW     Pain Ratin/10    Activity Tolerance:   Good      After treatment patient left in no apparent distress:   Sitting in chair, Call bell within reach and Caregiver / family present    COMMUNICATION/EDUCATION:   The patients plan of care was discussed with: Physical therapist, Physician and Case management. Fall prevention education was provided and the patient/caregiver indicated understanding. and Patient/family have participated as able in goal setting and plan of care.     Thank you for this referral.  Donna Beyer, PT   Time Calculation: 15 mins

## 2022-07-05 NOTE — ROUTINE PROCESS
Bedside shift change report given to jessica (oncoming nurse) by Jason Amador (offgoing nurse). Report included the following information Kardex.

## 2022-07-05 NOTE — ROUTINE PROCESS
Discharged instructions and prescriptions discussed with patient and caregiver with questions answered. Patient Name: Florencia Collins      MRN: [id number]   : 2002 Sex: adult PCP: Kymberly Cabello DO Age: 18 year old   Payor: [place] MEDICAID / Plan: [employer] MEDICAID / Product Type: T19        Who is calling?: patient mother  Medication name and dose: n/a    concern: patient mother was given info by dr acevedo on starting daughter on [facility] med[facility] please call mother    Callback Information  Best Contact Number: on file  Ok to leave a detailed message: Yes

## 2022-07-05 NOTE — PROGRESS NOTES
Spiritual Care Assessment/Progress Note  [facility]      NAME: Charleen Montoya      MRN: [id number]  AGE: 80 y.o. SEX: male  Lutheran Affiliation: No preference   Language: English     7/5/2022     Total Time (in minutes): 5     Spiritual Assessment begun in [facility] [address] through conversation with:         [x]Patient        [x] Family    [] Friend(s)        Reason for Consult: Initial/Spiritual assessment, patient floor     Spiritual beliefs: (Please include comment if needed)     [x] Identifies with a penny tradition:  Betsy Lowry       [] Supported by a penny community:            [] Claims no spiritual orientation:           [] Seeking spiritual identity:                [] Adheres to an individual form of spirituality:           [] Not able to assess:                           Identified resources for coping:      [] Prayer                               [] Music                  [] Guided Imagery     [x] Family/friends                 [] Pet visits     [] Devotional reading                         [] Unknown     [] Other:                                               Interventions offered during this visit: (See comments for more details)    Patient Interventions: Affirmation of penny,Affirmation of emotions/emotional suffering,Catharsis/review of pertinent events in supportive environment,Iconic (affirming the presence of God/Higher Power),Lutheran beliefs/image of God discussed     Family/Friend(s):  Affirmation of emotions/emotional suffering,Affirmation of penny,Iconic (affirming the presence of God/Higher Power),Lutheran beliefs/image of God discussed     Plan of Care:     [x] Support spiritual and/or cultural needs    [] Support AMD and/or advance care planning process      [] Support grieving process   [] Coordinate Rites and/or Rituals    [] Coordination with community clergy   [] No spiritual needs identified at this time   [] Detailed Plan of Care below (See Comments)  [] Make referral to Music Therapy  [] Make referral to Pet Therapy     [] Make referral to Addiction services  [] Make referral to [facility]  [] Make referral to Spiritual Care Partner  [] No future visits requested        [x] Contact Spiritual Care for further referrals     Comments: Visited with patient while rounding in the Acute Care Unit. Patient's caregiver was at bedside. Patient shared that he is doing much better and ready to go home. He is Restoration[employer], and has support at home.  provided presence, words of affirmation, and emotional and spiritual support. Advised of  availability. Reviewed chart. Rev.  Celestina Jones 48, [address]

## 2022-07-05 NOTE — PROGRESS NOTES
Patients case reviewed during interdisciplinary team meeting in [address] [place] [address]/Acute Care Unit. Rev.  Celestina Mazariegos 35, [address]

## 2022-07-05 NOTE — PROGRESS NOTES
Problem: Falls - Risk of  Goal: *Absence of Falls  Description: Document Tia Torres Fall Risk and appropriate interventions in the flowsheet.   Outcome: Resolved/Met  Note: Fall Risk Interventions:  Mobility Interventions: Utilize walker, cane, or other assistive device         Medication Interventions: Teach patient to arise slowly    Elimination Interventions: Patient to call for help with toileting needs    History of Falls Interventions: Room close to nurse's station         Problem: Patient Education: Go to Patient Education Activity  Goal: Patient/Family Education  Outcome: Resolved/Met     Problem: General Medical Care Plan  Goal: *Vital signs within specified parameters  Outcome: Resolved/Met  Goal: *Labs within defined limits  Outcome: Resolved/Met  Goal: *Absence of infection signs and symptoms  Outcome: Resolved/Met  Goal: *Optimal pain control at patient's stated goal  Outcome: Resolved/Met  Goal: *Skin integrity maintained  Outcome: Resolved/Met  Goal: *Fluid volume balance  Outcome: Resolved/Met  Goal: *Optimize nutritional status  Outcome: Resolved/Met  Goal: *Anxiety reduced or absent  Outcome: Resolved/Met  Goal: *Progressive mobility and function (eg: ADL's)  Outcome: Resolved/Met     Problem: Hypertension  Goal: *Blood pressure within specified parameters  Outcome: Resolved/Met  Goal: *Fluid volume balance  Outcome: Resolved/Met  Goal: *Labs within defined limits  Outcome: Resolved/Met

## 2022-07-05 NOTE — PROGRESS NOTES
Care Management Interventions  PCP Verified by CM: Yes (Dr. Terrance Naylor- last seen 4/2022)  Mode of Transport at Discharge: Other (see comment) (Significant other)  Transition of Care Consult (CM Consult): [address]: No  Reason Outside [place]: Out of service area  Physical Therapy Consult: Yes  Support Systems: Spouse/Significant Other  Confirm Follow Up Transport: Family  The Plan for Transition of Care is Related to the Following Treatment Goals : Patient lives with his significant other who is his primary caregiver. Recommend discharge home with home health nursing to assist with medications and monitor BP.   The Patient and/or Patient Representative was Provided with a Choice of Provider and Agrees with the Discharge Plan?: Yes ([address] [place] [address]. )  Name of the Patient Representative Who was Provided with a Choice of Provider and Agrees with the Discharge Plan: Radha Monte  Freedom of Choice List was Provided with Basic Dialogue that Supports the Patient's Individualized Plan of Care/Goals, Treatment Preferences and Shares the Quality Data Associated with the Providers?: Yes  Discharge Location  Patient Expects to be Discharged to[de-identified] Home with home health

## 2022-07-06 LAB
EST. AVERAGE GLUCOSE BLD GHB EST-MCNC: 140 MG/DL
HBA1C MFR BLD: 6.5 % (ref 4–5.6)

## 2022-08-09 ENCOUNTER — TELEPHONE (OUTPATIENT)
Dept: INTERNAL MEDICINE | Age: 87
End: 2022-08-09

## 2022-08-09 NOTE — TELEPHONE ENCOUNTER
With patient most recent hospitalization for hypertensive urgency bradycardia and patient's caregiver was able to answer the phone and states that patient is doing well and did follow-up with PCP and has not had any further episodes of dizziness also his blood pressure has been better controlled. He did have a Holter monitor placed for 7 days and did turn it in and noted results of normal sinus rhythm with 7 SVT events noted.   Overall she states patient is doing well but was instructed someone would call with appointment for cardiology follow-up and she still has not received 1 so we will attempt to arrange first available point with Vandana rose at change of cardiology to go through Holter results as well as to further evaluate blood pressure and make adjustments as needed

## 2023-03-07 ENCOUNTER — HOSPITAL ENCOUNTER (OUTPATIENT)
Age: 88
Discharge: HOME | End: 2023-03-10
Payer: MEDICARE

## 2023-03-07 DIAGNOSIS — H90.6 MIXED CONDUCTIVE AND SENSORINEURAL HEARING LOSS, BILATERAL: ICD-10-CM

## 2023-03-07 PROCEDURE — 70480 CT ORBIT/EAR/FOSSA W/O DYE: CPT

## 2024-05-18 ENCOUNTER — APPOINTMENT (OUTPATIENT)
Facility: HOSPITAL | Age: 89
End: 2024-05-18
Payer: MEDICARE

## 2024-05-18 ENCOUNTER — HOSPITAL ENCOUNTER (EMERGENCY)
Facility: HOSPITAL | Age: 89
Discharge: HOME | End: 2024-05-19
Attending: EMERGENCY MEDICINE
Payer: MEDICARE

## 2024-05-18 DIAGNOSIS — I50.22 CHRONIC SYSTOLIC CONGESTIVE HEART FAILURE (HCC): Primary | ICD-10-CM

## 2024-05-18 LAB
ALBUMIN SERPL-MCNC: 3.4 G/DL (ref 3.5–5)
ALBUMIN/GLOB SERPL: 1 (ref 1.1–2.2)
ALP SERPL-CCNC: 125 U/L (ref 45–117)
ALT SERPL-CCNC: 38 U/L (ref 12–78)
ANION GAP SERPL CALC-SCNC: 7 MMOL/L (ref 5–15)
AST SERPL W P-5'-P-CCNC: 34 U/L (ref 15–37)
BASOPHILS # BLD: 0 K/UL (ref 0–0.1)
BASOPHILS NFR BLD: 0 % (ref 0–1)
BILIRUB SERPL-MCNC: 0.5 MG/DL (ref 0.2–1)
BNP SERPL-MCNC: 3505 PG/ML
BUN SERPL-MCNC: 26 MG/DL (ref 6–20)
BUN/CREAT SERPL: 19 (ref 12–20)
CA-I BLD-MCNC: 10.5 MG/DL (ref 8.5–10.1)
CHLORIDE SERPL-SCNC: 102 MMOL/L (ref 97–108)
CO2 SERPL-SCNC: 29 MMOL/L (ref 21–32)
CREAT SERPL-MCNC: 1.35 MG/DL (ref 0.7–1.3)
DIFFERENTIAL METHOD BLD: ABNORMAL
EKG ATRIAL RATE: 96 BPM
EKG DIAGNOSIS: NORMAL
EKG P-R INTERVAL: 49 MS
EKG Q-T INTERVAL: 355 MS
EKG QRS DURATION: 101 MS
EKG QTC CALCULATION (BAZETT): 422 MS
EKG R AXIS: -16 DEGREES
EKG T AXIS: 74 DEGREES
EKG VENTRICULAR RATE: 85 BPM
EOSINOPHIL # BLD: 0.2 K/UL (ref 0–0.4)
EOSINOPHIL NFR BLD: 2 % (ref 0–7)
ERYTHROCYTE [DISTWIDTH] IN BLOOD BY AUTOMATED COUNT: 15.1 % (ref 11.5–14.5)
GLOBULIN SER CALC-MCNC: 3.4 G/DL (ref 2–4)
GLUCOSE SERPL-MCNC: 296 MG/DL (ref 65–100)
HCT VFR BLD AUTO: 41.9 % (ref 36.6–50.3)
HGB BLD-MCNC: 13.1 G/DL (ref 12.1–17)
IMM GRANULOCYTES # BLD AUTO: 0.3 K/UL (ref 0–0.04)
IMM GRANULOCYTES NFR BLD AUTO: 3 % (ref 0–0.5)
LYMPHOCYTES # BLD: 1 K/UL (ref 0.8–3.5)
LYMPHOCYTES NFR BLD: 11 % (ref 12–49)
MCH RBC QN AUTO: 26.8 PG (ref 26–34)
MCHC RBC AUTO-ENTMCNC: 31.3 G/DL (ref 30–36.5)
MCV RBC AUTO: 85.7 FL (ref 80–99)
MONOCYTES # BLD: 0.8 K/UL (ref 0–1)
MONOCYTES NFR BLD: 9 % (ref 5–13)
NEUTS SEG # BLD: 6.6 K/UL (ref 1.8–8)
NEUTS SEG NFR BLD: 75 % (ref 32–75)
NRBC # BLD: 0 K/UL (ref 0–0.01)
NRBC BLD-RTO: 0 PER 100 WBC
PLATELET # BLD AUTO: 169 K/UL (ref 150–400)
PMV BLD AUTO: 10.7 FL (ref 8.9–12.9)
POTASSIUM SERPL-SCNC: 4.6 MMOL/L (ref 3.5–5.1)
PROT SERPL-MCNC: 6.8 G/DL (ref 6.4–8.2)
RBC # BLD AUTO: 4.89 M/UL (ref 4.1–5.7)
RBC MORPH BLD: ABNORMAL
SODIUM SERPL-SCNC: 138 MMOL/L (ref 136–145)
TROPONIN I SERPL HS-MCNC: 12 NG/L (ref 0–76)
WBC # BLD AUTO: 8.9 K/UL (ref 4.1–11.1)

## 2024-05-18 PROCEDURE — 83880 ASSAY OF NATRIURETIC PEPTIDE: CPT

## 2024-05-18 PROCEDURE — 80053 COMPREHEN METABOLIC PANEL: CPT

## 2024-05-18 PROCEDURE — 71045 X-RAY EXAM CHEST 1 VIEW: CPT

## 2024-05-18 PROCEDURE — 99285 EMERGENCY DEPT VISIT HI MDM: CPT

## 2024-05-18 PROCEDURE — 6360000002 HC RX W HCPCS: Performed by: EMERGENCY MEDICINE

## 2024-05-18 PROCEDURE — 96374 THER/PROPH/DIAG INJ IV PUSH: CPT

## 2024-05-18 PROCEDURE — 84484 ASSAY OF TROPONIN QUANT: CPT

## 2024-05-18 PROCEDURE — 85025 COMPLETE CBC W/AUTO DIFF WBC: CPT

## 2024-05-18 RX ORDER — FUROSEMIDE 20 MG/1
20 TABLET ORAL DAILY
Qty: 10 TABLET | Refills: 0 | Status: SHIPPED | OUTPATIENT
Start: 2024-05-18

## 2024-05-18 RX ORDER — FUROSEMIDE 10 MG/ML
40 INJECTION INTRAMUSCULAR; INTRAVENOUS
Status: COMPLETED | OUTPATIENT
Start: 2024-05-18 | End: 2024-05-18

## 2024-05-18 RX ADMIN — FUROSEMIDE 40 MG: 10 INJECTION, SOLUTION INTRAMUSCULAR; INTRAVENOUS at 23:38

## 2024-05-18 ASSESSMENT — ENCOUNTER SYMPTOMS
EYES NEGATIVE: 1
GASTROINTESTINAL NEGATIVE: 1
ALLERGIC/IMMUNOLOGIC NEGATIVE: 1
CHEST TIGHTNESS: 0

## 2024-05-19 VITALS
HEIGHT: 66 IN | TEMPERATURE: 98.2 F | RESPIRATION RATE: 18 BRPM | OXYGEN SATURATION: 95 % | WEIGHT: 185 LBS | BODY MASS INDEX: 29.73 KG/M2 | DIASTOLIC BLOOD PRESSURE: 103 MMHG | SYSTOLIC BLOOD PRESSURE: 143 MMHG | HEART RATE: 86 BPM

## 2024-05-20 LAB
EKG ATRIAL RATE: 96 BPM
EKG DIAGNOSIS: NORMAL
EKG P-R INTERVAL: 49 MS
EKG Q-T INTERVAL: 355 MS
EKG QRS DURATION: 101 MS
EKG QTC CALCULATION (BAZETT): 422 MS
EKG R AXIS: -16 DEGREES
EKG T AXIS: 74 DEGREES
EKG VENTRICULAR RATE: 85 BPM

## 2024-05-23 ENCOUNTER — APPOINTMENT (OUTPATIENT)
Facility: HOSPITAL | Age: 89
End: 2024-05-23
Payer: MEDICARE

## 2024-05-23 ENCOUNTER — HOSPITAL ENCOUNTER (OUTPATIENT)
Facility: HOSPITAL | Age: 89
Setting detail: OBSERVATION
Discharge: HOME HEALTH | End: 2024-05-25
Attending: EMERGENCY MEDICINE | Admitting: HOSPITALIST
Payer: MEDICARE

## 2024-05-23 DIAGNOSIS — I48.91 ATRIAL FIBRILLATION, UNSPECIFIED TYPE (HCC): Primary | ICD-10-CM

## 2024-05-23 DIAGNOSIS — I50.9 CONGESTIVE HEART FAILURE, UNSPECIFIED HF CHRONICITY, UNSPECIFIED HEART FAILURE TYPE (HCC): ICD-10-CM

## 2024-05-23 DIAGNOSIS — I50.9 HEART FAILURE, UNSPECIFIED HF CHRONICITY, UNSPECIFIED HEART FAILURE TYPE (HCC): ICD-10-CM

## 2024-05-23 PROBLEM — R05.9 COUGH: Status: ACTIVE | Noted: 2024-05-23

## 2024-05-23 PROBLEM — N40.0 BPH (BENIGN PROSTATIC HYPERPLASIA): Status: ACTIVE | Noted: 2024-05-23

## 2024-05-23 PROBLEM — R41.82 AMS (ALTERED MENTAL STATUS): Status: ACTIVE | Noted: 2024-05-23

## 2024-05-23 PROBLEM — I48.20 CHRONIC ATRIAL FIBRILLATION (HCC): Status: ACTIVE | Noted: 2024-05-23

## 2024-05-23 PROBLEM — N17.9 AKI (ACUTE KIDNEY INJURY) (HCC): Status: ACTIVE | Noted: 2024-05-23

## 2024-05-23 LAB
ALBUMIN SERPL-MCNC: 3.2 G/DL (ref 3.5–5)
ALBUMIN/GLOB SERPL: 0.9 (ref 1.1–2.2)
ALP SERPL-CCNC: 120 U/L (ref 45–117)
ALT SERPL-CCNC: 25 U/L (ref 12–78)
ANION GAP SERPL CALC-SCNC: 9 MMOL/L (ref 5–15)
AST SERPL W P-5'-P-CCNC: 17 U/L (ref 15–37)
BASOPHILS # BLD: 0 K/UL (ref 0–0.1)
BASOPHILS NFR BLD: 0 % (ref 0–1)
BILIRUB SERPL-MCNC: 0.6 MG/DL (ref 0.2–1)
BNP SERPL-MCNC: 2450 PG/ML
BUN SERPL-MCNC: 32 MG/DL (ref 6–20)
BUN/CREAT SERPL: 19 (ref 12–20)
CA-I BLD-MCNC: 10.5 MG/DL (ref 8.5–10.1)
CHLORIDE SERPL-SCNC: 100 MMOL/L (ref 97–108)
CO2 SERPL-SCNC: 29 MMOL/L (ref 21–32)
CREAT SERPL-MCNC: 1.65 MG/DL (ref 0.7–1.3)
DIFFERENTIAL METHOD BLD: ABNORMAL
EOSINOPHIL # BLD: 0.2 K/UL (ref 0–0.4)
EOSINOPHIL NFR BLD: 3 % (ref 0–7)
ERYTHROCYTE [DISTWIDTH] IN BLOOD BY AUTOMATED COUNT: 14.7 % (ref 11.5–14.5)
EST. AVERAGE GLUCOSE BLD GHB EST-MCNC: 154 MG/DL
FLUAV RNA SPEC QL NAA+PROBE: NOT DETECTED
FLUBV RNA SPEC QL NAA+PROBE: NOT DETECTED
GLOBULIN SER CALC-MCNC: 3.5 G/DL (ref 2–4)
GLUCOSE BLD STRIP.AUTO-MCNC: 116 MG/DL (ref 65–100)
GLUCOSE BLD STRIP.AUTO-MCNC: 138 MG/DL (ref 65–100)
GLUCOSE SERPL-MCNC: 184 MG/DL (ref 65–100)
HBA1C MFR BLD: 7 % (ref 4–5.6)
HCT VFR BLD AUTO: 42.6 % (ref 36.6–50.3)
HGB BLD-MCNC: 13.5 G/DL (ref 12.1–17)
IMM GRANULOCYTES # BLD AUTO: 0.1 K/UL (ref 0–0.04)
IMM GRANULOCYTES NFR BLD AUTO: 1 % (ref 0–0.5)
INR PPP: 1 (ref 0.9–1.1)
LACTATE SERPL-SCNC: 1.8 MMOL/L (ref 0.4–2)
LYMPHOCYTES # BLD: 1.6 K/UL (ref 0.8–3.5)
LYMPHOCYTES NFR BLD: 19 % (ref 12–49)
MAGNESIUM SERPL-MCNC: 2.1 MG/DL (ref 1.6–2.4)
MCH RBC QN AUTO: 27.1 PG (ref 26–34)
MCHC RBC AUTO-ENTMCNC: 31.7 G/DL (ref 30–36.5)
MCV RBC AUTO: 85.5 FL (ref 80–99)
MONOCYTES # BLD: 0.9 K/UL (ref 0–1)
MONOCYTES NFR BLD: 10 % (ref 5–13)
NEUTS SEG # BLD: 5.5 K/UL (ref 1.8–8)
NEUTS SEG NFR BLD: 67 % (ref 32–75)
NRBC # BLD: 0 K/UL (ref 0–0.01)
NRBC BLD-RTO: 0 PER 100 WBC
PERFORMED BY:: ABNORMAL
PERFORMED BY:: ABNORMAL
PLATELET # BLD AUTO: 133 K/UL (ref 150–400)
PMV BLD AUTO: 10.5 FL (ref 8.9–12.9)
POTASSIUM SERPL-SCNC: 3.7 MMOL/L (ref 3.5–5.1)
PROT SERPL-MCNC: 6.7 G/DL (ref 6.4–8.2)
PROTHROMBIN TIME: 13.1 SEC (ref 11.9–14.6)
RBC # BLD AUTO: 4.98 M/UL (ref 4.1–5.7)
SARS-COV-2 RNA RESP QL NAA+PROBE: NOT DETECTED
SODIUM SERPL-SCNC: 138 MMOL/L (ref 136–145)
TROPONIN I SERPL HS-MCNC: 12 NG/L (ref 0–76)
TROPONIN I SERPL HS-MCNC: 13 NG/L (ref 0–76)
TSH SERPL DL<=0.05 MIU/L-ACNC: 1.36 UIU/ML (ref 0.36–3.74)
WBC # BLD AUTO: 8.3 K/UL (ref 4.1–11.1)

## 2024-05-23 PROCEDURE — 85025 COMPLETE CBC W/AUTO DIFF WBC: CPT

## 2024-05-23 PROCEDURE — 96372 THER/PROPH/DIAG INJ SC/IM: CPT

## 2024-05-23 PROCEDURE — G0378 HOSPITAL OBSERVATION PER HR: HCPCS

## 2024-05-23 PROCEDURE — 83605 ASSAY OF LACTIC ACID: CPT

## 2024-05-23 PROCEDURE — 83036 HEMOGLOBIN GLYCOSYLATED A1C: CPT

## 2024-05-23 PROCEDURE — 51798 US URINE CAPACITY MEASURE: CPT

## 2024-05-23 PROCEDURE — 82962 GLUCOSE BLOOD TEST: CPT

## 2024-05-23 PROCEDURE — 94640 AIRWAY INHALATION TREATMENT: CPT

## 2024-05-23 PROCEDURE — 6370000000 HC RX 637 (ALT 250 FOR IP): Performed by: HOSPITALIST

## 2024-05-23 PROCEDURE — 85610 PROTHROMBIN TIME: CPT

## 2024-05-23 PROCEDURE — 93005 ELECTROCARDIOGRAM TRACING: CPT | Performed by: EMERGENCY MEDICINE

## 2024-05-23 PROCEDURE — 87636 SARSCOV2 & INF A&B AMP PRB: CPT

## 2024-05-23 PROCEDURE — 84484 ASSAY OF TROPONIN QUANT: CPT

## 2024-05-23 PROCEDURE — 80053 COMPREHEN METABOLIC PANEL: CPT

## 2024-05-23 PROCEDURE — 83735 ASSAY OF MAGNESIUM: CPT

## 2024-05-23 PROCEDURE — 83880 ASSAY OF NATRIURETIC PEPTIDE: CPT

## 2024-05-23 PROCEDURE — 2580000003 HC RX 258: Performed by: HOSPITALIST

## 2024-05-23 PROCEDURE — 6360000002 HC RX W HCPCS: Performed by: HOSPITALIST

## 2024-05-23 PROCEDURE — 71045 X-RAY EXAM CHEST 1 VIEW: CPT

## 2024-05-23 PROCEDURE — 99285 EMERGENCY DEPT VISIT HI MDM: CPT

## 2024-05-23 PROCEDURE — 96374 THER/PROPH/DIAG INJ IV PUSH: CPT

## 2024-05-23 PROCEDURE — 84443 ASSAY THYROID STIM HORMONE: CPT

## 2024-05-23 PROCEDURE — 36415 COLL VENOUS BLD VENIPUNCTURE: CPT

## 2024-05-23 RX ORDER — SODIUM CHLORIDE 9 MG/ML
INJECTION, SOLUTION INTRAVENOUS PRN
Status: DISCONTINUED | OUTPATIENT
Start: 2024-05-23 | End: 2024-05-25 | Stop reason: HOSPADM

## 2024-05-23 RX ORDER — ONDANSETRON 4 MG/1
4 TABLET, ORALLY DISINTEGRATING ORAL EVERY 8 HOURS PRN
Status: DISCONTINUED | OUTPATIENT
Start: 2024-05-23 | End: 2024-05-25 | Stop reason: HOSPADM

## 2024-05-23 RX ORDER — ATORVASTATIN CALCIUM 40 MG/1
40 TABLET, FILM COATED ORAL NIGHTLY
Status: DISCONTINUED | OUTPATIENT
Start: 2024-05-23 | End: 2024-05-25 | Stop reason: HOSPADM

## 2024-05-23 RX ORDER — METOPROLOL SUCCINATE 50 MG/1
50 TABLET, EXTENDED RELEASE ORAL DAILY
Status: DISCONTINUED | OUTPATIENT
Start: 2024-05-23 | End: 2024-05-24

## 2024-05-23 RX ORDER — LACTULOSE 10 G/15ML
20 SOLUTION ORAL ONCE
Status: COMPLETED | OUTPATIENT
Start: 2024-05-23 | End: 2024-05-23

## 2024-05-23 RX ORDER — FLUTICASONE PROPIONATE 50 MCG
2 SPRAY, SUSPENSION (ML) NASAL DAILY
Status: DISCONTINUED | OUTPATIENT
Start: 2024-05-24 | End: 2024-05-25 | Stop reason: HOSPADM

## 2024-05-23 RX ORDER — LANOLIN ALCOHOL/MO/W.PET/CERES
400 CREAM (GRAM) TOPICAL DAILY
Status: DISCONTINUED | OUTPATIENT
Start: 2024-05-23 | End: 2024-05-25 | Stop reason: HOSPADM

## 2024-05-23 RX ORDER — ONDANSETRON 2 MG/ML
4 INJECTION INTRAMUSCULAR; INTRAVENOUS EVERY 6 HOURS PRN
Status: DISCONTINUED | OUTPATIENT
Start: 2024-05-23 | End: 2024-05-25 | Stop reason: HOSPADM

## 2024-05-23 RX ORDER — MECLIZINE HCL 12.5 MG/1
12.5 TABLET ORAL 3 TIMES DAILY PRN
COMMUNITY

## 2024-05-23 RX ORDER — TAMSULOSIN HYDROCHLORIDE 0.4 MG/1
0.4 CAPSULE ORAL DAILY
Status: DISCONTINUED | OUTPATIENT
Start: 2024-05-23 | End: 2024-05-25 | Stop reason: HOSPADM

## 2024-05-23 RX ORDER — LANOLIN ALCOHOL/MO/W.PET/CERES
400 CREAM (GRAM) TOPICAL DAILY
COMMUNITY

## 2024-05-23 RX ORDER — MAGNESIUM SULFATE IN WATER 40 MG/ML
2000 INJECTION, SOLUTION INTRAVENOUS PRN
Status: DISCONTINUED | OUTPATIENT
Start: 2024-05-23 | End: 2024-05-25 | Stop reason: HOSPADM

## 2024-05-23 RX ORDER — FLUTICASONE PROPIONATE 50 MCG
2 SPRAY, SUSPENSION (ML) NASAL DAILY
COMMUNITY
Start: 2023-04-10

## 2024-05-23 RX ORDER — ACETAMINOPHEN 650 MG/1
650 SUPPOSITORY RECTAL EVERY 6 HOURS PRN
Status: DISCONTINUED | OUTPATIENT
Start: 2024-05-23 | End: 2024-05-25 | Stop reason: HOSPADM

## 2024-05-23 RX ORDER — SODIUM CHLORIDE 0.9 % (FLUSH) 0.9 %
5-40 SYRINGE (ML) INJECTION EVERY 12 HOURS SCHEDULED
Status: DISCONTINUED | OUTPATIENT
Start: 2024-05-23 | End: 2024-05-25 | Stop reason: HOSPADM

## 2024-05-23 RX ORDER — GUAIFENESIN/DEXTROMETHORPHAN 100-10MG/5
10 SYRUP ORAL 4 TIMES DAILY
Status: DISCONTINUED | OUTPATIENT
Start: 2024-05-23 | End: 2024-05-25

## 2024-05-23 RX ORDER — POLYETHYLENE GLYCOL 3350 17 G/17G
17 POWDER, FOR SOLUTION ORAL DAILY PRN
Status: DISCONTINUED | OUTPATIENT
Start: 2024-05-23 | End: 2024-05-25 | Stop reason: HOSPADM

## 2024-05-23 RX ORDER — CETIRIZINE HYDROCHLORIDE 5 MG/1
5 TABLET ORAL NIGHTLY
Status: DISCONTINUED | OUTPATIENT
Start: 2024-05-23 | End: 2024-05-25 | Stop reason: HOSPADM

## 2024-05-23 RX ORDER — ACETAMINOPHEN 325 MG/1
650 TABLET ORAL EVERY 6 HOURS PRN
Status: DISCONTINUED | OUTPATIENT
Start: 2024-05-23 | End: 2024-05-25 | Stop reason: HOSPADM

## 2024-05-23 RX ORDER — MECLIZINE HCL 12.5 MG/1
12.5 TABLET ORAL 3 TIMES DAILY PRN
Status: DISCONTINUED | OUTPATIENT
Start: 2024-05-23 | End: 2024-05-25 | Stop reason: HOSPADM

## 2024-05-23 RX ORDER — LISINOPRIL 20 MG/1
20 TABLET ORAL DAILY
COMMUNITY
Start: 2024-03-09

## 2024-05-23 RX ORDER — POTASSIUM CHLORIDE 7.45 MG/ML
10 INJECTION INTRAVENOUS PRN
Status: DISCONTINUED | OUTPATIENT
Start: 2024-05-23 | End: 2024-05-25 | Stop reason: HOSPADM

## 2024-05-23 RX ORDER — INSULIN LISPRO 100 [IU]/ML
0-4 INJECTION, SOLUTION INTRAVENOUS; SUBCUTANEOUS NIGHTLY
Status: DISCONTINUED | OUTPATIENT
Start: 2024-05-23 | End: 2024-05-25 | Stop reason: HOSPADM

## 2024-05-23 RX ORDER — CIPROFLOXACIN 500 MG/1
1 TABLET, FILM COATED ORAL EVERY 12 HOURS
Status: ON HOLD | COMMUNITY
End: 2024-05-25 | Stop reason: HOSPADM

## 2024-05-23 RX ORDER — ASPIRIN 81 MG/1
81 TABLET ORAL DAILY
Status: DISCONTINUED | OUTPATIENT
Start: 2024-05-24 | End: 2024-05-25 | Stop reason: HOSPADM

## 2024-05-23 RX ORDER — DOCUSATE SODIUM 100 MG/1
100 CAPSULE, LIQUID FILLED ORAL 2 TIMES DAILY
Status: DISCONTINUED | OUTPATIENT
Start: 2024-05-23 | End: 2024-05-25 | Stop reason: HOSPADM

## 2024-05-23 RX ORDER — SODIUM CHLORIDE AND POTASSIUM CHLORIDE 150; 900 MG/100ML; MG/100ML
INJECTION, SOLUTION INTRAVENOUS CONTINUOUS
Status: DISCONTINUED | OUTPATIENT
Start: 2024-05-23 | End: 2024-05-24

## 2024-05-23 RX ORDER — POTASSIUM CHLORIDE 20 MEQ/1
40 TABLET, EXTENDED RELEASE ORAL PRN
Status: DISCONTINUED | OUTPATIENT
Start: 2024-05-23 | End: 2024-05-25 | Stop reason: HOSPADM

## 2024-05-23 RX ORDER — SODIUM CHLORIDE 0.9 % (FLUSH) 0.9 %
5-40 SYRINGE (ML) INJECTION PRN
Status: DISCONTINUED | OUTPATIENT
Start: 2024-05-23 | End: 2024-05-25 | Stop reason: HOSPADM

## 2024-05-23 RX ORDER — ALLOPURINOL 100 MG/1
100 TABLET ORAL DAILY
COMMUNITY
Start: 2024-04-24

## 2024-05-23 RX ORDER — CLOPIDOGREL BISULFATE 75 MG/1
75 TABLET ORAL DAILY
Status: DISCONTINUED | OUTPATIENT
Start: 2024-05-24 | End: 2024-05-25 | Stop reason: HOSPADM

## 2024-05-23 RX ORDER — HYDROCODONE BITARTRATE AND ACETAMINOPHEN 5; 325 MG/1; MG/1
1 TABLET ORAL EVERY 6 HOURS PRN
Status: ON HOLD | COMMUNITY
End: 2024-05-25 | Stop reason: HOSPADM

## 2024-05-23 RX ORDER — DEXTROSE MONOHYDRATE 100 MG/ML
INJECTION, SOLUTION INTRAVENOUS CONTINUOUS PRN
Status: DISCONTINUED | OUTPATIENT
Start: 2024-05-23 | End: 2024-05-25 | Stop reason: HOSPADM

## 2024-05-23 RX ORDER — ENOXAPARIN SODIUM 100 MG/ML
40 INJECTION SUBCUTANEOUS DAILY
Status: DISCONTINUED | OUTPATIENT
Start: 2024-05-23 | End: 2024-05-25 | Stop reason: HOSPADM

## 2024-05-23 RX ORDER — IPRATROPIUM BROMIDE AND ALBUTEROL SULFATE 2.5; .5 MG/3ML; MG/3ML
1 SOLUTION RESPIRATORY (INHALATION) ONCE
Status: COMPLETED | OUTPATIENT
Start: 2024-05-23 | End: 2024-05-23

## 2024-05-23 RX ORDER — GLUCAGON 1 MG/ML
1 KIT INJECTION PRN
Status: DISCONTINUED | OUTPATIENT
Start: 2024-05-23 | End: 2024-05-25 | Stop reason: HOSPADM

## 2024-05-23 RX ORDER — INSULIN LISPRO 100 [IU]/ML
0-8 INJECTION, SOLUTION INTRAVENOUS; SUBCUTANEOUS
Status: DISCONTINUED | OUTPATIENT
Start: 2024-05-23 | End: 2024-05-25 | Stop reason: HOSPADM

## 2024-05-23 RX ADMIN — POTASSIUM CHLORIDE AND SODIUM CHLORIDE: 900; 150 INJECTION, SOLUTION INTRAVENOUS at 18:06

## 2024-05-23 RX ADMIN — IPRATROPIUM BROMIDE AND ALBUTEROL SULFATE 1 DOSE: .5; 3 SOLUTION RESPIRATORY (INHALATION) at 21:24

## 2024-05-23 RX ADMIN — DOCUSATE SODIUM 100 MG: 100 CAPSULE, LIQUID FILLED ORAL at 21:56

## 2024-05-23 RX ADMIN — Medication 1 LOZENGE: at 21:55

## 2024-05-23 RX ADMIN — CETIRIZINE HYDROCHLORIDE 5 MG: 5 TABLET ORAL at 21:55

## 2024-05-23 RX ADMIN — GUAIFENESIN SYRUP AND DEXTROMETHORPHAN 10 ML: 100; 10 SYRUP ORAL at 21:56

## 2024-05-23 RX ADMIN — ENOXAPARIN SODIUM 40 MG: 100 INJECTION SUBCUTANEOUS at 18:06

## 2024-05-23 RX ADMIN — METHYLPREDNISOLONE SODIUM SUCCINATE 40 MG: 40 INJECTION INTRAMUSCULAR; INTRAVENOUS at 21:55

## 2024-05-23 RX ADMIN — SODIUM CHLORIDE, PRESERVATIVE FREE 10 ML: 5 INJECTION INTRAVENOUS at 21:56

## 2024-05-23 RX ADMIN — LACTULOSE 20 G: 20 SOLUTION ORAL at 21:55

## 2024-05-23 ASSESSMENT — PAIN SCALES - GENERAL
PAINLEVEL_OUTOF10: 0

## 2024-05-23 ASSESSMENT — LIFESTYLE VARIABLES
HOW MANY STANDARD DRINKS CONTAINING ALCOHOL DO YOU HAVE ON A TYPICAL DAY: PATIENT DOES NOT DRINK
HOW OFTEN DO YOU HAVE A DRINK CONTAINING ALCOHOL: NEVER

## 2024-05-23 ASSESSMENT — ENCOUNTER SYMPTOMS
ALLERGIC/IMMUNOLOGIC NEGATIVE: 1
COUGH: 1
EYES NEGATIVE: 1
CONSTIPATION: 1
SHORTNESS OF BREATH: 1

## 2024-05-23 ASSESSMENT — PAIN - FUNCTIONAL ASSESSMENT: PAIN_FUNCTIONAL_ASSESSMENT: 0-10

## 2024-05-23 NOTE — ED TRIAGE NOTES
No care due was identified.  [facility] [place] Embedded Care Due Messages. Reference number: [id number].   8/21/2023 1:34:33 PM CDT   Pt was here over the weekend with fluid overload.  C/o dry consistent cough. Caregiver states he is now confused. Urine dark   normal...

## 2024-05-23 NOTE — PROGRESS NOTES
Pt admitted to room 202. Bladder scan 152 cc. Congested cough. Wheezes. IVF started. Pt on RA. Family in. MD in to examine.

## 2024-05-23 NOTE — H&P
History and Physical  Dr Santana Marsh MD      Chief Complaints:     Chief Complaint   Patient presents with    Cough    Altered Mental Status         Subjective:     Jacques Donaldson is a 88 y.o. male followed by Bill Lozoya MD and  has a past medical history of Atrial fibrillation, new onset (HCC), BPH (benign prostatic hyperplasia), Cerebral artery occlusion with cerebral infarction (HCC), CHF (congestive heart failure) (HCC), Diabetes mellitus (HCC), Gout, Hard of hearing, History of stroke, HLD (hyperlipidemia), Hyperlipidemia, and Hypertension.    Presents from home and recently seen at [facility] on 5/18 with noted elevated BNP peripheral edema fluid versus viral pneumonia on chest x-ray was instructed to increase Lasix from 20 mg daily to 40 mg daily for 10 days.  Peripheral edema has improved but continues to have cough which describes mostly as dry nonproductive denies any sore throat or trouble swallowing he gets short of breath especially with intensity of the coughing.  Significant other was giving him over-the-counter cough medicine with minimal relief.  Has been continuing for at least 2 to 3 weeks he denies any fevers chills.  She notes that his urine output has been less and usually goes about 5 times per day secondary to his BPH.  He also has been constipated for the last 3 days and also has been increased fatigue and sleeping.  At baseline noted that he walks with a walker but not at all times.  Was treated with oral prednisone.  Is on chronic lisinopril therapy but does not relate current cough to the lisinopril.  Initial labs showed a BNP that is elevated 2,450 which is improved compared to 5/18 but last BNP done in February 2024 was only 259.  Chest x-ray does not show any acute pathology BUN/creatinine was 32/1.65 previous baseline in 2022 as well as recent admission in 2024 February was 1.05.  Urinalysis shows trace blood 100 protein 1+ bacteria but negative nitrates negative leukocytes.   telemetry  -Potassium 3.7 and supplement with 20 mEq KCl and IV fluid  -Mag stable at 2.1  -No noted anticoagulation other than aspirin and Plavix the patient is also high fall risk  -Noted metoprolol succinate 50 mg daily and will restart    HTN  -Monitor vitals as per floor protocol  -With current cough and LUIS we will hold lisinopril and Lasix  -Restart metoprolol succinate 50 mg daily    Diabetes  -Follow A1c  -Monitor with sliding scale with Accu-Cheks before meals and at bedtime  -On chronic metformin 500 mg twice daily but will hold secondary to LUIS    Recent covid 19 pneumonia   - was treated with steroids and remdesivir and hospitalized for 5 days prior to discharge to skilled rehab  -Denies any fevers and current chest x-ray is negative  -Follow-up 2D echo and repeat CT of the chest to evaluate the cough and increased BNP      DVT prophylaxis  -Lovenox    CODE STATUS: Full code  Patient designated contact/family member is his daughter Alyssa    Discussion/MDM: Patient with multiple medical comorbidities, each with high likelihood for morbidity and mortality if left untreated.   I have reviewed patient's presenting subjective and objective findings, as well as all laboratory studies, imaging studies, and vital signs to date as well as treatment rendered and patient's response to those treatments.  In addition, prior medical, surgical and relevant social and family histories were reviewed. I have discussed management plan with patient/family and with nursing staff.      Toxic drug monitoring: Monitor renal function closely secondary to urinary retention    Spent 80 minutes evaluating cording patient's admission to remote telemetry and expecting 1 to 2 days of acute care stay    Electronically signed by Santana Marsh MD on 5/23/2024 at 8:03 PM

## 2024-05-23 NOTE — ED PROVIDER NOTES
[facility] EMERGENCY DEPT  EMERGENCY DEPARTMENT HISTORY AND PHYSICAL EXAM      Date: 5/23/2024  Patient Name: Jacques Donaldson  MRN: [id number]  YOB: 1935  Date of evaluation: 5/23/2024  Provider: Suzan Moscoso MD   Note Started: 3:05 PM EDT 5/23/24    HISTORY OF PRESENT ILLNESS     Chief Complaint   Patient presents with    Cough    Altered Mental Status       History Provided By: Patient    HPI: Jacques Donaldson is a 88 y.o. male with DM, HTN, CHF, afib returns to ED after visit on 5/18, PCP follow-up on 5/21 presents with worsening confusion, weakness and not doing well per care giver. Pt finished his steroids with minimal improvement.     PAST MEDICAL HISTORY   Past Medical History:  Past Medical History:   Diagnosis Date    Atrial fibrillation, new onset (HCC) 02/2024    BPH (benign prostatic hyperplasia)     Cerebral artery occlusion with cerebral infarction (HCC)     CHF (congestive heart failure) (HCC)     Diabetes mellitus (HCC)     Gout     Hard of hearing     History of stroke 2019    with right side weakness    HLD (hyperlipidemia)     Hyperlipidemia     Hypertension        Past Surgical History:  History reviewed. No pertinent surgical history.    Family History:  History reviewed. No pertinent family history.    Social History:  Social History     Tobacco Use    Smoking status: Never    Smokeless tobacco: Never   Substance Use Topics    Alcohol use: Never       Allergies:  Allergies   Allergen Reactions    Influenza Virus Vaccine Other (See Comments)     Reaction Type: Allergy; Reaction(s): Paralyzed arm    Tramadol Hallucinations     Reaction Type: Allergy       PCP: Bill Lozoya MD    Current Meds:   No current facility-administered medications for this encounter.     Current Outpatient Medications   Medication Sig Dispense Refill    albuterol sulfate HFA (PROVENTIL;VENTOLIN;PROAIR) 108 (90 Base) MCG/ACT inhaler Inhale 2 puffs into the lungs every 4 hours as needed for Wheezing or Shortness of  Breath 18 g 3    mometasone-formoterol (DULERA) 100-5 MCG/ACT inhaler Inhale 2 puffs into the lungs 2 times daily 1 each 1    cetirizine (ZYRTEC) 5 MG tablet Take 1 tablet by mouth daily 30 tablet 0    carvedilol (COREG) 6.25 MG tablet Take 1 tablet by mouth 2 times daily (with meals) 60 tablet 1    guaiFENesin-dextromethorphan (ROBITUSSIN DM) 100-10 MG/5ML syrup Take 10 mLs by mouth every 4 hours as needed for Cough 120 mL 1    benzonatate (TESSALON) 100 MG capsule Take 2 capsules by mouth in the morning, at noon, and at bedtime for 10 days 60 capsule 0    doxycycline hyclate (VIBRA-TABS) 100 MG tablet Take 1 tablet by mouth 2 times daily for 5 days 10 tablet 0    docusate sodium (COLACE, DULCOLAX) 100 MG CAPS Take 100 mg by mouth 2 times daily 60 capsule 0    benzocaine-menthol (CEPACOL SORE THROAT) 15-3.6 MG lozenge Take 1 lozenge by mouth in the morning, at noon, and at bedtime 168 lozenge 0    predniSONE (DELTASONE) 20 MG tablet Take 1 tablet by mouth 2 times daily for 5 days, THEN 1 tablet daily for 5 days. 15 tablet 0    fluticasone (FLONASE) 50 MCG/ACT nasal spray 2 sprays by Nasal route daily      allopurinol (ZYLOPRIM) 100 MG tablet Take 1 tablet by mouth daily      lisinopril (PRINIVIL;ZESTRIL) 20 MG tablet Take 1 tablet by mouth daily      magnesium oxide (MAG-OX) 400 (240 Mg) MG tablet Take 1 tablet by mouth daily      meclizine (ANTIVERT) 12.5 MG tablet Take 1 tablet by mouth 3 times daily as needed for Dizziness      furosemide (LASIX) 20 MG tablet Take 1 tablet by mouth daily 10 tablet 0    acetaminophen (TYLENOL) 325 MG tablet Take by mouth every 4 hours as needed      aspirin 81 MG EC tablet Take by mouth daily      atorvastatin (LIPITOR) 40 MG tablet Take by mouth every evening      clopidogrel (PLAVIX) 75 MG tablet Take by mouth      metFORMIN (GLUCOPHAGE) 500 MG tablet Take by mouth Daily with lunch      tamsulosin (FLOMAX) 0.4 MG capsule Take by mouth daily         Social Determinants of

## 2024-05-24 ENCOUNTER — APPOINTMENT (OUTPATIENT)
Facility: HOSPITAL | Age: 89
End: 2024-05-24
Attending: HOSPITALIST
Payer: MEDICARE

## 2024-05-24 PROBLEM — R91.8 PULMONARY NODULES: Status: ACTIVE | Noted: 2024-05-24

## 2024-05-24 LAB
ANION GAP SERPL CALC-SCNC: 9 MMOL/L (ref 5–15)
APPEARANCE UR: CLEAR
BACTERIA URNS QL MICRO: NEGATIVE /HPF
BASOPHILS # BLD: 0 K/UL (ref 0–0.1)
BASOPHILS NFR BLD: 0 % (ref 0–1)
BILIRUB UR QL: NEGATIVE
BNP SERPL-MCNC: 3562 PG/ML
BUN SERPL-MCNC: 28 MG/DL (ref 6–20)
BUN/CREAT SERPL: 22 (ref 12–20)
CA-I BLD-MCNC: 10.1 MG/DL (ref 8.5–10.1)
CHLORIDE SERPL-SCNC: 104 MMOL/L (ref 97–108)
CHOLEST SERPL-MCNC: 146 MG/DL
CO2 SERPL-SCNC: 24 MMOL/L (ref 21–32)
COLOR UR: ABNORMAL
CREAT SERPL-MCNC: 1.29 MG/DL (ref 0.7–1.3)
D DIMER PPP FEU-MCNC: 2.05 UG/ML(FEU)
DIFFERENTIAL METHOD BLD: ABNORMAL
ECHO AO ROOT DIAM: 3.6 CM
ECHO AO ROOT INDEX: 1.78 CM/M2
ECHO AV AREA PEAK VELOCITY: 1.9 CM2
ECHO AV AREA VTI: 1.9 CM2
ECHO AV AREA/BSA PEAK VELOCITY: 0.9 CM2/M2
ECHO AV AREA/BSA VTI: 0.9 CM2/M2
ECHO AV MEAN GRADIENT: 9 MMHG
ECHO AV MEAN VELOCITY: 1.4 M/S
ECHO AV PEAK GRADIENT: 14 MMHG
ECHO AV PEAK VELOCITY: 1.9 M/S
ECHO AV VELOCITY RATIO: 0.47
ECHO AV VTI: 35.1 CM
ECHO BSA: 2.04 M2
ECHO BSA: 2.04 M2
ECHO EST RA PRESSURE: 3 MMHG
ECHO LA DIAMETER INDEX: 2.13 CM/M2
ECHO LA DIAMETER: 4.3 CM
ECHO LA TO AORTIC ROOT RATIO: 1.19
ECHO LA VOL A-L A2C: 85 ML (ref 18–58)
ECHO LA VOL A-L A4C: 92 ML (ref 18–58)
ECHO LA VOL BP: 88 ML (ref 18–58)
ECHO LA VOL MOD A2C: 81 ML (ref 18–58)
ECHO LA VOL MOD A4C: 87 ML (ref 18–58)
ECHO LA VOL/BSA BIPLANE: 44 ML/M2 (ref 16–34)
ECHO LA VOLUME AREA LENGTH: 93 ML
ECHO LA VOLUME INDEX A-L A2C: 42 ML/M2 (ref 16–34)
ECHO LA VOLUME INDEX A-L A4C: 46 ML/M2 (ref 16–34)
ECHO LA VOLUME INDEX AREA LENGTH: 46 ML/M2 (ref 16–34)
ECHO LA VOLUME INDEX MOD A2C: 40 ML/M2 (ref 16–34)
ECHO LA VOLUME INDEX MOD A4C: 43 ML/M2 (ref 16–34)
ECHO LV E' LATERAL VELOCITY: 9 CM/S
ECHO LV E' SEPTAL VELOCITY: 8 CM/S
ECHO LV EDV A2C: 39 ML
ECHO LV EDV A4C: 71 ML
ECHO LV EDV BP: 53 ML (ref 67–155)
ECHO LV EDV INDEX A4C: 35 ML/M2
ECHO LV EDV INDEX BP: 26 ML/M2
ECHO LV EDV NDEX A2C: 19 ML/M2
ECHO LV EJECTION FRACTION A2C: 72 %
ECHO LV EJECTION FRACTION A4C: 67 %
ECHO LV EJECTION FRACTION BIPLANE: 69 % (ref 55–100)
ECHO LV ESV A2C: 11 ML
ECHO LV ESV A4C: 23 ML
ECHO LV ESV BP: 16 ML (ref 22–58)
ECHO LV ESV INDEX A2C: 5 ML/M2
ECHO LV ESV INDEX A4C: 11 ML/M2
ECHO LV ESV INDEX BP: 8 ML/M2
ECHO LV FRACTIONAL SHORTENING: 40 % (ref 28–44)
ECHO LV GLOBAL LONGITUDINAL STRAIN (GLS): -10.9 %
ECHO LV INTERNAL DIMENSION DIASTOLE INDEX: 2.48 CM/M2
ECHO LV INTERNAL DIMENSION DIASTOLIC: 5 CM (ref 4.2–5.9)
ECHO LV INTERNAL DIMENSION SYSTOLIC INDEX: 1.49 CM/M2
ECHO LV INTERNAL DIMENSION SYSTOLIC: 3 CM
ECHO LV IVSD: 1.2 CM (ref 0.6–1)
ECHO LV MASS 2D: 220.3 G (ref 88–224)
ECHO LV MASS INDEX 2D: 109 G/M2 (ref 49–115)
ECHO LV POSTERIOR WALL DIASTOLIC: 1.1 CM (ref 0.6–1)
ECHO LV RELATIVE WALL THICKNESS RATIO: 0.44
ECHO LVOT AREA: 4.2 CM2
ECHO LVOT AV VTI INDEX: 0.48
ECHO LVOT DIAM: 2.3 CM
ECHO LVOT MEAN GRADIENT: 2 MMHG
ECHO LVOT PEAK GRADIENT: 3 MMHG
ECHO LVOT PEAK VELOCITY: 0.9 M/S
ECHO LVOT STROKE VOLUME INDEX: 34.7 ML/M2
ECHO LVOT SV: 70.2 ML
ECHO LVOT VTI: 16.9 CM
ECHO MV E DECELERATION TIME (DT): 154.2 MS
ECHO MV E VELOCITY: 1.2 M/S
ECHO MV E/E' LATERAL: 13.33
ECHO MV E/E' RATIO (AVERAGED): 14.17
ECHO MV E/E' SEPTAL: 15
ECHO RA AREA 4C: 17.7 CM2
ECHO RIGHT VENTRICULAR SYSTOLIC PRESSURE (RVSP): 40 MMHG
ECHO RV FREE WALL PEAK S': 13 CM/S
ECHO RV INTERNAL DIMENSION: 2.8 CM
ECHO RV TAPSE: 2.2 CM (ref 1.7–?)
ECHO TV REGURGITANT MAX VELOCITY: 3.05 M/S
ECHO TV REGURGITANT PEAK GRADIENT: 38 MMHG
EKG ATRIAL RATE: 0 BPM
EKG DIAGNOSIS: NORMAL
EKG P AXIS: 252 DEGREES
EKG P-R INTERVAL: 206 MS
EKG Q-T INTERVAL: 357 MS
EKG QRS DURATION: 98 MS
EKG QTC CALCULATION (BAZETT): 422 MS
EKG R AXIS: -20 DEGREES
EKG T AXIS: 64 DEGREES
EKG VENTRICULAR RATE: 84 BPM
EOSINOPHIL # BLD: 0 K/UL (ref 0–0.4)
EOSINOPHIL NFR BLD: 0 % (ref 0–7)
ERYTHROCYTE [DISTWIDTH] IN BLOOD BY AUTOMATED COUNT: 14.6 % (ref 11.5–14.5)
GLUCOSE BLD STRIP.AUTO-MCNC: 173 MG/DL (ref 65–100)
GLUCOSE BLD STRIP.AUTO-MCNC: 216 MG/DL (ref 65–100)
GLUCOSE BLD STRIP.AUTO-MCNC: 239 MG/DL (ref 65–100)
GLUCOSE BLD STRIP.AUTO-MCNC: 267 MG/DL (ref 65–100)
GLUCOSE SERPL-MCNC: 260 MG/DL (ref 65–100)
GLUCOSE UR STRIP.AUTO-MCNC: 250 MG/DL
HCT VFR BLD AUTO: 40.3 % (ref 36.6–50.3)
HDLC SERPL-MCNC: 37 MG/DL
HDLC SERPL: 3.9 (ref 0–5)
HGB BLD-MCNC: 12.6 G/DL (ref 12.1–17)
HGB UR QL STRIP: NEGATIVE
IMM GRANULOCYTES # BLD AUTO: 0.1 K/UL (ref 0–0.04)
IMM GRANULOCYTES NFR BLD AUTO: 1 % (ref 0–0.5)
KETONES UR QL STRIP.AUTO: ABNORMAL MG/DL
LDLC SERPL CALC-MCNC: 82 MG/DL (ref 0–100)
LEUKOCYTE ESTERASE UR QL STRIP.AUTO: NEGATIVE
LIPID PANEL: NORMAL
LYMPHOCYTES # BLD: 0.7 K/UL (ref 0.8–3.5)
LYMPHOCYTES NFR BLD: 10 % (ref 12–49)
MCH RBC QN AUTO: 26.8 PG (ref 26–34)
MCHC RBC AUTO-ENTMCNC: 31.3 G/DL (ref 30–36.5)
MCV RBC AUTO: 85.6 FL (ref 80–99)
MONOCYTES # BLD: 0.1 K/UL (ref 0–1)
MONOCYTES NFR BLD: 1 % (ref 5–13)
NEUTS SEG # BLD: 6.3 K/UL (ref 1.8–8)
NEUTS SEG NFR BLD: 88 % (ref 32–75)
NITRITE UR QL STRIP.AUTO: NEGATIVE
NRBC # BLD: 0 K/UL (ref 0–0.01)
NRBC BLD-RTO: 0 PER 100 WBC
PERFORMED BY:: ABNORMAL
PH UR STRIP: 6 (ref 5–8)
PLATELET # BLD AUTO: 134 K/UL (ref 150–400)
PMV BLD AUTO: 11.7 FL (ref 8.9–12.9)
POTASSIUM SERPL-SCNC: 4.5 MMOL/L (ref 3.5–5.1)
PROT UR STRIP-MCNC: 30 MG/DL
RBC # BLD AUTO: 4.71 M/UL (ref 4.1–5.7)
RBC #/AREA URNS HPF: ABNORMAL /HPF (ref 0–3)
SODIUM SERPL-SCNC: 137 MMOL/L (ref 136–145)
SP GR UR REFRACTOMETRY: 1.02 (ref 1–1.03)
TRIGL SERPL-MCNC: 135 MG/DL
URINE CULTURE IF INDICATED: ABNORMAL
UROBILINOGEN UR QL STRIP.AUTO: 0.2 EU/DL (ref 0.2–1)
VLDLC SERPL CALC-MCNC: 27 MG/DL
WBC # BLD AUTO: 7.2 K/UL (ref 4.1–11.1)
WBC URNS QL MICRO: ABNORMAL /HPF (ref 0–5)

## 2024-05-24 PROCEDURE — 82962 GLUCOSE BLOOD TEST: CPT

## 2024-05-24 PROCEDURE — 93970 EXTREMITY STUDY: CPT

## 2024-05-24 PROCEDURE — 96376 TX/PRO/DX INJ SAME DRUG ADON: CPT

## 2024-05-24 PROCEDURE — G0378 HOSPITAL OBSERVATION PER HR: HCPCS

## 2024-05-24 PROCEDURE — 6360000004 HC RX CONTRAST MEDICATION: Performed by: HOSPITALIST

## 2024-05-24 PROCEDURE — 80048 BASIC METABOLIC PNL TOTAL CA: CPT

## 2024-05-24 PROCEDURE — 2580000003 HC RX 258: Performed by: HOSPITALIST

## 2024-05-24 PROCEDURE — 97165 OT EVAL LOW COMPLEX 30 MIN: CPT

## 2024-05-24 PROCEDURE — 36415 COLL VENOUS BLD VENIPUNCTURE: CPT

## 2024-05-24 PROCEDURE — 6370000000 HC RX 637 (ALT 250 FOR IP)

## 2024-05-24 PROCEDURE — 85379 FIBRIN DEGRADATION QUANT: CPT

## 2024-05-24 PROCEDURE — 6370000000 HC RX 637 (ALT 250 FOR IP): Performed by: HOSPITALIST

## 2024-05-24 PROCEDURE — 97116 GAIT TRAINING THERAPY: CPT

## 2024-05-24 PROCEDURE — 93306 TTE W/DOPPLER COMPLETE: CPT

## 2024-05-24 PROCEDURE — 81001 URINALYSIS AUTO W/SCOPE: CPT

## 2024-05-24 PROCEDURE — 85025 COMPLETE CBC W/AUTO DIFF WBC: CPT

## 2024-05-24 PROCEDURE — 83880 ASSAY OF NATRIURETIC PEPTIDE: CPT

## 2024-05-24 PROCEDURE — 6360000002 HC RX W HCPCS: Performed by: HOSPITALIST

## 2024-05-24 PROCEDURE — 80061 LIPID PANEL: CPT

## 2024-05-24 PROCEDURE — 97161 PT EVAL LOW COMPLEX 20 MIN: CPT

## 2024-05-24 PROCEDURE — 51798 US URINE CAPACITY MEASURE: CPT

## 2024-05-24 PROCEDURE — 71275 CT ANGIOGRAPHY CHEST: CPT

## 2024-05-24 RX ORDER — CARVEDILOL 3.12 MG/1
6.25 TABLET ORAL 2 TIMES DAILY WITH MEALS
Status: DISCONTINUED | OUTPATIENT
Start: 2024-05-24 | End: 2024-05-25 | Stop reason: HOSPADM

## 2024-05-24 RX ORDER — FUROSEMIDE 20 MG/1
20 TABLET ORAL DAILY
Status: DISCONTINUED | OUTPATIENT
Start: 2024-05-25 | End: 2024-05-25 | Stop reason: HOSPADM

## 2024-05-24 RX ADMIN — METHYLPREDNISOLONE SODIUM SUCCINATE 40 MG: 40 INJECTION INTRAMUSCULAR; INTRAVENOUS at 08:33

## 2024-05-24 RX ADMIN — ASPIRIN 81 MG: 81 TABLET, COATED ORAL at 08:35

## 2024-05-24 RX ADMIN — GUAIFENESIN SYRUP AND DEXTROMETHORPHAN 10 ML: 100; 10 SYRUP ORAL at 12:45

## 2024-05-24 RX ADMIN — CARVEDILOL 6.25 MG: 3.12 TABLET, FILM COATED ORAL at 16:40

## 2024-05-24 RX ADMIN — SODIUM CHLORIDE, PRESERVATIVE FREE 10 ML: 5 INJECTION INTRAVENOUS at 08:36

## 2024-05-24 RX ADMIN — CARVEDILOL 6.25 MG: 3.12 TABLET, FILM COATED ORAL at 09:38

## 2024-05-24 RX ADMIN — SODIUM CHLORIDE, PRESERVATIVE FREE 10 ML: 5 INJECTION INTRAVENOUS at 21:40

## 2024-05-24 RX ADMIN — ATORVASTATIN CALCIUM 40 MG: 40 TABLET, FILM COATED ORAL at 21:39

## 2024-05-24 RX ADMIN — METHYLPREDNISOLONE SODIUM SUCCINATE 40 MG: 40 INJECTION INTRAMUSCULAR; INTRAVENOUS at 21:40

## 2024-05-24 RX ADMIN — GUAIFENESIN SYRUP AND DEXTROMETHORPHAN 10 ML: 100; 10 SYRUP ORAL at 16:40

## 2024-05-24 RX ADMIN — INSULIN LISPRO 2 UNITS: 100 INJECTION, SOLUTION INTRAVENOUS; SUBCUTANEOUS at 08:33

## 2024-05-24 RX ADMIN — IOPAMIDOL 100 ML: 755 INJECTION, SOLUTION INTRAVENOUS at 13:27

## 2024-05-24 RX ADMIN — Medication 1 LOZENGE: at 21:39

## 2024-05-24 RX ADMIN — INSULIN LISPRO 4 UNITS: 100 INJECTION, SOLUTION INTRAVENOUS; SUBCUTANEOUS at 16:40

## 2024-05-24 RX ADMIN — CETIRIZINE HYDROCHLORIDE 5 MG: 5 TABLET ORAL at 21:39

## 2024-05-24 RX ADMIN — CLOPIDOGREL BISULFATE 75 MG: 75 TABLET ORAL at 08:35

## 2024-05-24 RX ADMIN — GUAIFENESIN SYRUP AND DEXTROMETHORPHAN 10 ML: 100; 10 SYRUP ORAL at 21:39

## 2024-05-24 RX ADMIN — Medication 400 MG: at 08:36

## 2024-05-24 RX ADMIN — DOCUSATE SODIUM 100 MG: 100 CAPSULE, LIQUID FILLED ORAL at 08:36

## 2024-05-24 RX ADMIN — Medication 1 LOZENGE: at 08:34

## 2024-05-24 RX ADMIN — INSULIN LISPRO 2 UNITS: 100 INJECTION, SOLUTION INTRAVENOUS; SUBCUTANEOUS at 12:45

## 2024-05-24 RX ADMIN — FLUTICASONE PROPIONATE 2 SPRAY: 50 SPRAY, METERED NASAL at 08:41

## 2024-05-24 RX ADMIN — GUAIFENESIN SYRUP AND DEXTROMETHORPHAN 10 ML: 100; 10 SYRUP ORAL at 08:33

## 2024-05-24 RX ADMIN — TAMSULOSIN HYDROCHLORIDE 0.4 MG: 0.4 CAPSULE ORAL at 08:35

## 2024-05-24 RX ADMIN — METOPROLOL SUCCINATE 50 MG: 50 TABLET, EXTENDED RELEASE ORAL at 08:36

## 2024-05-24 RX ADMIN — DOCUSATE SODIUM 100 MG: 100 CAPSULE, LIQUID FILLED ORAL at 21:39

## 2024-05-24 ASSESSMENT — PAIN DESCRIPTION - LOCATION: LOCATION: THROAT

## 2024-05-24 ASSESSMENT — ENCOUNTER SYMPTOMS
EYES NEGATIVE: 1
ALLERGIC/IMMUNOLOGIC NEGATIVE: 1
SHORTNESS OF BREATH: 0
COUGH: 1
GASTROINTESTINAL NEGATIVE: 1

## 2024-05-24 ASSESSMENT — PAIN SCALES - GENERAL
PAINLEVEL_OUTOF10: 0

## 2024-05-24 NOTE — CONSULTS
MD    magnesium oxide (MAG-OX) tablet 400 mg, 400 mg, Oral, Daily, Santana Marsh MD, 400 mg at 05/24/24 0836    fluticasone (FLONASE) 50 MCG/ACT nasal spray 2 spray, 2 spray, Nasal, Daily, Santana Marsh MD, 2 spray at 05/24/24 0841    docusate sodium (COLACE) capsule 100 mg, 100 mg, Oral, BID, Santana Marsh MD, 100 mg at 05/24/24 0836    methylPREDNISolone sodium succ (SOLU-MEDROL) 40 mg in sterile water 1 mL injection, 40 mg, IntraVENous, Daily, Santana Marsh MD, 40 mg at 05/24/24 0833     ALLERGIES:  Allergies reviewed with the patient,  Allergies   Allergen Reactions    Influenza Virus Vaccine Other (See Comments)     Reaction Type: Allergy; Reaction(s): Paralyzed arm    Tramadol Hallucinations     Reaction Type: Allergy    .      FAMILY HISTORY:  Family history reviewed.       SOCIAL HISTORY:  Notable for no tobacco use, no heavy alcohol or illicit drug use.      REVIEW OF SYSTEMS:  Complete review of systems performed, pertinents noted above, all other systems are negative.    PHYSICAL EXAMINATION:    General:  alert x2  Cardiovascular:  irregularly irregular, + murmur  Respiratory:  Lungs are diminished  Abdomen:  soft, nontender  Extremities:  no lower extremity edema  Skin:  Dry, warm  Psych:  Normal affect      Vitals:    05/24/24 0738   BP: (!) 152/115   Pulse: 83   Resp:    Temp:    SpO2:        Recent labs results and imaging reviewed.     Discussed case with Dr. Dupree and our impression and recommendations are as follows:  Atrial fibrillation  Rate controlled   Afib 70-80's   CHADSVASC atleast 7  Discussed OAC//risk of thromboembolic events//benefit v. Risk given advanced age  Will defer for now and they will discuss and let us know  Monitor tele  Continue BB  Echo pending  OP holter monitor to assess afib burden  CHF exacerbation  BNP >3500  IV lasix  Monitor I/O's closely  Bladder scan  Echo pending  HTN  Monitor per unit protocol  Increased coreg dosing   HLD  Continue statin

## 2024-05-24 NOTE — PROGRESS NOTES
normal wall motion moderately calcified aortic valve RVSP of 40 mmHg left atrium is moderately dilated and IVC diameter is less than or equal to 21 and decreases greater than 50% during inspiration  - repeat bnp after IV fluids increased to 3,562 so IV fluids dc and evaluat  -Will restart Lasix in a.m. tomorrow     Chronic A fib   -New diagnosis since February 2024 when patient had COVID-19  -Does have typical symptoms of obstructive sleep apnea but never had sleep study and not on CPAP  -Monitor on telemetry  -Potassium 3.7 and supplement with 20 mEq KCl and IV fluid -> 4.5  -Mag stable at 2.1  -No noted anticoagulation other than aspirin and Plavix the patient is also high fall risk and had extensive conversation with patient and significant other on 5/24 and explained bleeding risk vs benefit. Will discuss with PCP about changing to eliquis or xarelto therapy  -Noted metoprolol succinate 50 mg daily and restarted but changed to coreg 6.25mg oral bid dosing on 5/24   - d dimer elevated at 2.05 so will pursue CTA chest found to be negative for PE fluid or pneumonia noted possible atelectasis also noted adenopathy  Cardiology recommending outpatient event monitor to evaluate A-fib burden also discussed about anticoagulation risk versus benefits     HTN  -Monitor vitals as per floor protocol  -With current cough and LUIS we will hold lisinopril and Lasix  -Restart metoprolol succinate 50 mg daily  - 5/24: cardiology changed to coreg 6.25mg oral bid,      Diabetes  -Follow A1c is 7  -Monitor with sliding scale with Accu-Cheks before meals and at bedtime  -On chronic metformin 500 mg twice daily but will hold secondary to ULIS and continue to hold secondary to IV contrast study      Recent covid 19 pneumonia   - was treated with steroids and remdesivir and hospitalized for 5 days prior to discharge to skilled rehab  -Denies any fevers and current chest x-ray is negative  - d dimer elevated so CTA ordered and found to be  negative for PE, pneumonia or fluid  - improved cough, continue steroids         DVT prophylaxis  -Lovenox     CODE STATUS: Full code  Patient designated contact/family member is his daughter Alyssa        Discussion/MDM: Patient with multiple medical comorbidities, each with high likelihood for morbidity and mortality if left untreated.   I have reviewed patient's presenting subjective and objective findings, as well as all laboratory studies, imaging studies, and vital signs to date as well as treatment rendered and patient's response to those treatments.  In addition, prior medical, surgical and relevant social and family histories were reviewed. I have discussed management plan with patient/family and with nursing staff.      Toxic drug monitoring:      Spent 55 minutes evaluting and coordinating patients care      Electronically signed by Satnana Marsh MD on 5/24/2024 at 2:39 PM

## 2024-05-24 NOTE — PLAN OF CARE
Problem: ABCDS Injury Assessment  Goal: Absence of physical injury  5/23/2024 2045 by Kristine Anne LPN  Outcome: Progressing  5/23/2024 1815 by Nellie Garrido RN  Outcome: Progressing     Problem: Safety - Adult  Goal: Free from fall injury  Outcome: Progressing     Problem: Skin/Tissue Integrity  Goal: Absence of new skin breakdown  Description: 1.  Monitor for areas of redness and/or skin breakdown  2.  Assess vascular access sites hourly  3.  Every 4-6 hours minimum:  Change oxygen saturation probe site  4.  Every 4-6 hours:  If on nasal continuous positive airway pressure, respiratory therapy assess nares and determine need for appliance change or resting period.  Outcome: Progressing

## 2024-05-24 NOTE — PLAN OF CARE
PHYSICAL THERAPY EVALUATION    Patient: Jacques Donaldson (88 y.o. male)  Date: 5/24/2024  Primary Diagnosis: Heart failure (HCC) [I50.9]  Atrial fibrillation, unspecified type (HCC) [I48.91]  Heart failure, unspecified HF chronicity, unspecified heart failure type (HCC) [I50.9]  Congestive heart failure, unspecified HF chronicity, unspecified heart failure type (HCC) [I50.9]       Precautions: None                    ASSESSMENT :   DEFICITS/IMPAIRMENTS:   The patient is limited by decreased functional mobility, independence in ADLs, ROM, strength, activity tolerance, endurance, safety awareness. Patient is hard of hearing so communication was done with elevated voices. Was able to get up from bed and ambulate with assistive device and sit back down into chair at end. Patient required minor cueing for walk positioning and safety awareness sequencing when sitting back down into chair and managing walking from wall and objects in the way.  Based on the impairments listed above, patient is not functioning at prior level of function which was to ambulate without AD some indoors and to be able to be without supervision from significant other.     Patient will benefit from skilled intervention to address the above impairments.        PLAN :  Recommendations and Planned Interventions:   gait training, therapeutic exercises, and therapeutic activities    Frequency/Duration: Patient will be followed by physical therapy, 1-2 times daily, up to 5 days a week to address goals.    Recommendation for discharge: (in order for the patient to meet his/her long term goals): Therapy 2x a week in the home    Other factors to consider for discharge: impaired cognition and high risk for falls    IF patient discharges home will need the following DME: patient owns DME required for discharge       SUBJECTIVE:   Patient stated “I am ready when you are.”    OBJECTIVE DATA SUMMARY:     Past Medical History:   Diagnosis Date    Atrial  Independent  Transfers:     Transfer Training  Transfer Training: Yes  Overall Level of Assistance: Independent  Sit to Stand: Independent  Stand to Sit: Supervision  Stand Pivot Transfers: Independent  Toilet Transfer: Stand-by assistance  Balance:   Balance  Sitting: Intact  Standing: Intact    Ambulation/Gait Training:  Gait  Gait Training: Yes  Overall Level of Assistance: Modified independent  Distance (ft): 250 Feet  Assistive Device: Walker, rolling  Interventions: Safety awareness training;Verbal cues  Gait Abnormalities: Decreased step clearance  Stairs - Level of Assistance: Supervision  Number of Stairs Trained: 10 (simulated stairs)  Treatment Session:  Went from supine-to-sit in bed and sit-to-stand with rolling walker. Ambulated around the hallway loop of the floor and proceeded to sit back down in chair in room. Performed simulation steps for 10 times to assess ability to replicate at home. Minor cueing with alternating step sequencing.    Pain Ratin/10     Activity Tolerance:   Good    After treatment:   Patient left in no apparent distress sitting up in chair, Call bell within reach, and significant other present in the room.    COMMUNICATION/EDUCATION:   Patient Education  Education Given To: Patient;Other (Comment)  Education Provided: Role of Therapy;Plan of Care;Transfer Training;IADL Safety  Education Method: Verbal  Barriers to Learning: None  Education Outcome: Verbalized understanding;Continued education needed    Thank you for this referral.  Stephanie Waller PT, DPT  Minutes: 28      Physical Therapy Evaluation Charge Determination   History Examination Presentation Decision-Making   MEDIUM  Complexity : 1-2 comorbidities / personal factors will impact the outcome/ POC  LOW Complexity : 1-2 Standardized tests and measures addressing body structure, function, activity limitation and / or participation in recreation  LOW Complexity : Stable, uncomplicated  LOW    Based on the above

## 2024-05-24 NOTE — PLAN OF CARE
OCCUPATIONAL THERAPY EVALUATION    Patient: Jacques Donaldson (88 y.o. male)  Date: 5/24/2024  Primary Diagnosis: Heart failure (HCC) [I50.9]  Atrial fibrillation, unspecified type (HCC) [I48.91]  Heart failure, unspecified HF chronicity, unspecified heart failure type (HCC) [I50.9]  Congestive heart failure, unspecified HF chronicity, unspecified heart failure type (HCC) [I50.9]         Precautions: Fall Risk, Bed Alarm                  ASSESSMENT :  The patient is limited by decreased functional mobility, independence in ADLs, strength, activity tolerance, endurance, safety awareness, balance. Prior to hospitalization patient completed toileting tasks independently using rollator for mobility. Patient with decreased safety and independence with ADL tasks this date, decreased strength in bilateral UE and decreased standing balance.     Based on the impairments listed above patient would benefit from occupational therapy services to return to [facility].       PLAN :  Recommendations and Planned Interventions:   self care training, therapeutic activities, functional mobility training, balance training, therapeutic exercise, patient education, home safety training, and family training/education    Frequency/Duration:      Recommendation for discharge: (in order for the patient to meet his/her long term goals): Therapy 2x a week in the home, however, may require supervision, cognitive and/or physical assistance due to the following concerns listed below:    Other factors to consider for discharge: high risk for falls, not safe to be alone, and concern for safely navigating or managing the home environment    IF patient discharges home will need the following DME: patient owns DME required for discharge       SUBJECTIVE:   Patient stated, “She is always right there.”  OBJECTIVE DATA SUMMARY:     Past Medical History:   Diagnosis Date    Atrial fibrillation, new onset (HCC) 02/2024    BPH (benign prostatic hyperplasia)

## 2024-05-25 VITALS
OXYGEN SATURATION: 97 % | HEIGHT: 68 IN | BODY MASS INDEX: 28.81 KG/M2 | HEART RATE: 71 BPM | SYSTOLIC BLOOD PRESSURE: 123 MMHG | DIASTOLIC BLOOD PRESSURE: 90 MMHG | WEIGHT: 190.13 LBS | RESPIRATION RATE: 18 BRPM | TEMPERATURE: 97.7 F

## 2024-05-25 LAB
ANION GAP SERPL CALC-SCNC: 9 MMOL/L (ref 5–15)
BASOPHILS # BLD: 0 K/UL (ref 0–0.1)
BASOPHILS NFR BLD: 0 % (ref 0–1)
BUN SERPL-MCNC: 31 MG/DL (ref 6–20)
BUN/CREAT SERPL: 21 (ref 12–20)
CA-I BLD-MCNC: 10.9 MG/DL (ref 8.5–10.1)
CHLORIDE SERPL-SCNC: 102 MMOL/L (ref 97–108)
CO2 SERPL-SCNC: 26 MMOL/L (ref 21–32)
CREAT SERPL-MCNC: 1.48 MG/DL (ref 0.7–1.3)
DIFFERENTIAL METHOD BLD: ABNORMAL
EOSINOPHIL # BLD: 0 K/UL (ref 0–0.4)
EOSINOPHIL NFR BLD: 0 % (ref 0–7)
ERYTHROCYTE [DISTWIDTH] IN BLOOD BY AUTOMATED COUNT: 14.8 % (ref 11.5–14.5)
GLUCOSE BLD STRIP.AUTO-MCNC: 237 MG/DL (ref 65–100)
GLUCOSE BLD STRIP.AUTO-MCNC: 259 MG/DL (ref 65–100)
GLUCOSE SERPL-MCNC: 294 MG/DL (ref 65–100)
HCT VFR BLD AUTO: 42.7 % (ref 36.6–50.3)
HGB BLD-MCNC: 13.5 G/DL (ref 12.1–17)
IMM GRANULOCYTES # BLD AUTO: 0.1 K/UL (ref 0–0.04)
IMM GRANULOCYTES NFR BLD AUTO: 1 % (ref 0–0.5)
LYMPHOCYTES # BLD: 0.9 K/UL (ref 0.8–3.5)
LYMPHOCYTES NFR BLD: 5 % (ref 12–49)
MAGNESIUM SERPL-MCNC: 2 MG/DL (ref 1.6–2.4)
MCH RBC QN AUTO: 27.2 PG (ref 26–34)
MCHC RBC AUTO-ENTMCNC: 31.6 G/DL (ref 30–36.5)
MCV RBC AUTO: 85.9 FL (ref 80–99)
MONOCYTES # BLD: 0.5 K/UL (ref 0–1)
MONOCYTES NFR BLD: 3 % (ref 5–13)
NEUTS SEG # BLD: 14.9 K/UL (ref 1.8–8)
NEUTS SEG NFR BLD: 91 % (ref 32–75)
NRBC # BLD: 0 K/UL (ref 0–0.01)
NRBC BLD-RTO: 0 PER 100 WBC
PERFORMED BY:: ABNORMAL
PERFORMED BY:: ABNORMAL
PLATELET # BLD AUTO: 149 K/UL (ref 150–400)
PMV BLD AUTO: 11.4 FL (ref 8.9–12.9)
POTASSIUM SERPL-SCNC: 4.5 MMOL/L (ref 3.5–5.1)
RBC # BLD AUTO: 4.97 M/UL (ref 4.1–5.7)
SODIUM SERPL-SCNC: 137 MMOL/L (ref 136–145)
WBC # BLD AUTO: 16.5 K/UL (ref 4.1–11.1)

## 2024-05-25 PROCEDURE — G0378 HOSPITAL OBSERVATION PER HR: HCPCS

## 2024-05-25 PROCEDURE — 6370000000 HC RX 637 (ALT 250 FOR IP): Performed by: HOSPITALIST

## 2024-05-25 PROCEDURE — 6360000002 HC RX W HCPCS: Performed by: HOSPITALIST

## 2024-05-25 PROCEDURE — 85025 COMPLETE CBC W/AUTO DIFF WBC: CPT

## 2024-05-25 PROCEDURE — 36415 COLL VENOUS BLD VENIPUNCTURE: CPT

## 2024-05-25 PROCEDURE — 96372 THER/PROPH/DIAG INJ SC/IM: CPT

## 2024-05-25 PROCEDURE — 2580000003 HC RX 258: Performed by: HOSPITALIST

## 2024-05-25 PROCEDURE — 6370000000 HC RX 637 (ALT 250 FOR IP)

## 2024-05-25 PROCEDURE — 82962 GLUCOSE BLOOD TEST: CPT

## 2024-05-25 PROCEDURE — 94640 AIRWAY INHALATION TREATMENT: CPT

## 2024-05-25 PROCEDURE — 80048 BASIC METABOLIC PNL TOTAL CA: CPT

## 2024-05-25 PROCEDURE — 83735 ASSAY OF MAGNESIUM: CPT

## 2024-05-25 RX ORDER — CARVEDILOL 6.25 MG/1
6.25 TABLET ORAL 2 TIMES DAILY WITH MEALS
Qty: 60 TABLET | Refills: 1 | Status: SHIPPED | OUTPATIENT
Start: 2024-05-25

## 2024-05-25 RX ORDER — BENZONATATE 100 MG/1
200 CAPSULE ORAL 3 TIMES DAILY
Qty: 60 CAPSULE | Refills: 0 | Status: SHIPPED | OUTPATIENT
Start: 2024-05-25 | End: 2024-06-04

## 2024-05-25 RX ORDER — ALBUTEROL SULFATE 90 UG/1
2 AEROSOL, METERED RESPIRATORY (INHALATION) EVERY 6 HOURS PRN
Status: DISCONTINUED | OUTPATIENT
Start: 2024-05-25 | End: 2024-05-25 | Stop reason: HOSPADM

## 2024-05-25 RX ORDER — ALBUTEROL SULFATE 90 UG/1
2 AEROSOL, METERED RESPIRATORY (INHALATION) EVERY 4 HOURS PRN
Qty: 18 G | Refills: 3 | Status: SHIPPED | OUTPATIENT
Start: 2024-05-25

## 2024-05-25 RX ORDER — PREDNISONE 20 MG/1
TABLET ORAL
Qty: 15 TABLET | Refills: 0 | Status: SHIPPED | OUTPATIENT
Start: 2024-05-25 | End: 2024-06-04

## 2024-05-25 RX ORDER — DOXYCYCLINE HYCLATE 100 MG
100 TABLET ORAL 2 TIMES DAILY
Qty: 10 TABLET | Refills: 0 | Status: SHIPPED | OUTPATIENT
Start: 2024-05-25 | End: 2024-05-30

## 2024-05-25 RX ORDER — PSEUDOEPHEDRINE HCL 30 MG
100 TABLET ORAL 2 TIMES DAILY
Qty: 60 CAPSULE | Refills: 0 | Status: SHIPPED | OUTPATIENT
Start: 2024-05-25

## 2024-05-25 RX ORDER — GUAIFENESIN/DEXTROMETHORPHAN 100-10MG/5
10 SYRUP ORAL EVERY 4 HOURS PRN
Status: DISCONTINUED | OUTPATIENT
Start: 2024-05-25 | End: 2024-05-25 | Stop reason: HOSPADM

## 2024-05-25 RX ORDER — GUAIFENESIN/DEXTROMETHORPHAN 100-10MG/5
10 SYRUP ORAL EVERY 4 HOURS PRN
Qty: 120 ML | Refills: 1 | Status: SHIPPED | OUTPATIENT
Start: 2024-05-25 | End: 2024-06-04

## 2024-05-25 RX ORDER — CETIRIZINE HYDROCHLORIDE 5 MG/1
5 TABLET ORAL DAILY
Qty: 30 TABLET | Refills: 0 | Status: SHIPPED | OUTPATIENT
Start: 2024-05-25

## 2024-05-25 RX ADMIN — FUROSEMIDE 20 MG: 20 TABLET ORAL at 09:28

## 2024-05-25 RX ADMIN — INSULIN LISPRO 2 UNITS: 100 INJECTION, SOLUTION INTRAVENOUS; SUBCUTANEOUS at 07:56

## 2024-05-25 RX ADMIN — ASPIRIN 81 MG: 81 TABLET, COATED ORAL at 09:29

## 2024-05-25 RX ADMIN — DOCUSATE SODIUM 100 MG: 100 CAPSULE, LIQUID FILLED ORAL at 09:29

## 2024-05-25 RX ADMIN — TAMSULOSIN HYDROCHLORIDE 0.4 MG: 0.4 CAPSULE ORAL at 09:29

## 2024-05-25 RX ADMIN — INSULIN LISPRO 4 UNITS: 100 INJECTION, SOLUTION INTRAVENOUS; SUBCUTANEOUS at 12:10

## 2024-05-25 RX ADMIN — ALBUTEROL SULFATE 2 PUFF: 108 AEROSOL, METERED RESPIRATORY (INHALATION) at 09:21

## 2024-05-25 RX ADMIN — Medication 400 MG: at 09:29

## 2024-05-25 RX ADMIN — FLUTICASONE PROPIONATE 2 SPRAY: 50 SPRAY, METERED NASAL at 09:30

## 2024-05-25 RX ADMIN — CARVEDILOL 6.25 MG: 3.12 TABLET, FILM COATED ORAL at 07:55

## 2024-05-25 RX ADMIN — ENOXAPARIN SODIUM 40 MG: 100 INJECTION SUBCUTANEOUS at 09:28

## 2024-05-25 RX ADMIN — CLOPIDOGREL BISULFATE 75 MG: 75 TABLET ORAL at 09:29

## 2024-05-25 RX ADMIN — SODIUM CHLORIDE, PRESERVATIVE FREE 10 ML: 5 INJECTION INTRAVENOUS at 09:30

## 2024-05-25 RX ADMIN — Medication 1 LOZENGE: at 09:29

## 2024-05-25 ASSESSMENT — PAIN SCALES - GENERAL: PAINLEVEL_OUTOF10: 0

## 2024-05-25 NOTE — DISCHARGE SUMMARY
2x a week in the home     Other factors to consider for discharge: impaired cognition and high risk for falls     IF patient discharges home will need the following DME: patient owns DME required for discharge                Vitals:     05/24/24 0738   BP: (!) 152/115   Pulse: 83   Resp:     Temp:     SpO2:           Recent labs results and imaging reviewed.      Discussed case with Dr. Dupree and our impression and recommendations are as follows:  Atrial fibrillation  Rate controlled   Afib 70-80's   CHADSVASC atleast 7  Discussed OAC//risk of thromboembolic events//benefit v. Risk given advanced age  Will defer for now and they will discuss and let us know  Monitor tele  Continue BB  Echo pending  OP holter monitor to assess afib burden  CHF exacerbation  BNP >3500  IV lasix  Monitor I/O's closely  Bladder scan  Echo pending  HTN  Monitor per unit protocol  Increased coreg dosing   HLD  Continue statin therapy     Thank you for involving us in the care of this patient.  Please do not hesitate to call me or Dr. Dupree if additional questions arise.     Sona Contreras, APRN - CNP  5/24/2024    Vitals Last 24 Hours:  Patient Vitals for the past 24 hrs:   Temp Pulse Resp BP SpO2   05/25/24 1112 97.7 °F (36.5 °C) 71 18 (!) 123/90 97 %   05/25/24 0733 97.3 °F (36.3 °C) 88 18 136/86 96 %   05/25/24 0326 97.7 °F (36.5 °C) 89 18 (!) 165/93 98 %   05/24/24 2352 97.7 °F (36.5 °C) 74 18 (!) 142/86 96 %   05/24/24 1951 98.4 °F (36.9 °C) 72 20 117/76 96 %   05/24/24 1730 -- 86 18 -- 97 %   05/24/24 1621 97.5 °F (36.4 °C) 85 18 (!) 134/91 95 %        Discharge Exam:  General: Alert, cooperative, no distress.  Head:   Normocephalic, without obvious abnormality, atraumatic.  Eyes:   Conjunctivae/corneas clear. Pupils equal, round, reactive to light. Extraocular movements intact.  Lungs:  b/l air entry diminished,left > right with mild rhonchi at bases. No wheeze   Chest wall: No tenderness or deformity.  Heart:  irregular  capsule  Commonly known as: FLOMAX            STOP taking these medications      amLODIPine 5 MG tablet  Commonly known as: NORVASC     ciprofloxacin 500 MG tablet  Commonly known as: CIPRO     HYDROcodone-acetaminophen 5-325 MG per tablet  Commonly known as: NORCO     Metoprolol Succinate 50 MG Cs24               Where to Get Your Medications        These medications were sent to [facility] [address] [place], [place] - [address] [facility] - P [phone] - F [phone]  [address] [facility] [address]      Phone: [phone]   albuterol sulfate  (90 Base) MCG/ACT inhaler  benzocaine-menthol 15-3.6 MG lozenge  benzonatate 100 MG capsule  carvedilol 6.25 MG tablet  cetirizine 5 MG tablet  docusate 100 MG Caps  doxycycline hyclate 100 MG tablet  guaiFENesin-dextromethorphan 100-10 MG/5ML syrup  mometasone-formoterol 100-5 MCG/ACT inhaler  predniSONE 20 MG tablet           * Follow-up Care/Patient Instructions:  Activity: activity as tolerated  Diet: cardiac diet and diabetic diet      Bill Lozoya MD  [address] [facility] [address] [phone]    Follow up on 5/31/2024  @ 11:00    [address] [address]  [phone]  Follow up on 6/10/2024  @ 11:30    Cielo Ross MD  [address] [facility] [address] [phone]    Follow up  will call with appt next week   to follow up hospitalization and also to follow up noted pulmonary nodules on CTA chest    Spent 45 minutes evaluting and coordinating patient care and educating patient on new medications including inhalers and discharging home with home health for skilled nursing as well as PT OT    Signed:  Santana Marsh MD  5/25/2024  2:06 PM

## 2024-05-25 NOTE — PLAN OF CARE
Problem: ABCDS Injury Assessment  Goal: Absence of physical injury  Outcome: Progressing  Flowsheets (Taken 5/25/2024 1047)  Absence of Physical Injury: Implement safety measures based on patient assessment     Problem: ABCDS Injury Assessment  Goal: Absence of physical injury  Outcome: Progressing  Flowsheets (Taken 5/25/2024 1047)  Absence of Physical Injury: Implement safety measures based on patient assessment     Problem: ABCDS Injury Assessment  Goal: Absence of physical injury  Outcome: Progressing  Flowsheets (Taken 5/25/2024 1047)  Absence of Physical Injury: Implement safety measures based on patient assessment     Problem: ABCDS Injury Assessment  Goal: Absence of physical injury  Outcome: Progressing  Flowsheets (Taken 5/25/2024 1047)  Absence of Physical Injury: Implement safety measures based on patient assessment

## 2024-05-25 NOTE — PROGRESS NOTES
Has been resting quietly in bed with eyes closed.  No c/o pain.  Non productive cough noted.  POA/friend in.

## 2024-05-25 NOTE — PROGRESS NOTES
Patient escorted to vehicle via w/chair with girl friend at side.  Discharge instructions and medications reviewed.  All questions answered.

## 2024-05-25 NOTE — DISCHARGE INSTRUCTIONS
NOTIFY YOUR PHYSICIAN FOR ANY OF THE FOLLOWING:   Fever over 101 degrees for 24 hours.   Chest pain, shortness of breath, fever, chills, nausea, vomiting, diarrhea, change in mentation, falling, weakness, bleeding. Severe pain or pain not relieved by medications, as well as any other signs or symptoms that you may have questions about     Home health will come and assist with inhaler use and monitor cough and respiratory symptoms for resolution also will monitor blood pressure and heart rate with new medications started    Should also monitor daily weights and keep log but if greater than 2 pound weight gain in 24 hours or greater than 5 pound weight gain in 5 days then would recommend taking an extra dose of Lasix at dinnertime but informing PCP and/or cardiology

## 2024-06-22 PROBLEM — R05.9 COUGH: Status: RESOLVED | Noted: 2024-05-23 | Resolved: 2024-06-22

## 2024-08-25 ENCOUNTER — APPOINTMENT (OUTPATIENT)
Facility: HOSPITAL | Age: 89
End: 2024-08-25
Payer: MEDICARE

## 2024-08-25 ENCOUNTER — HOSPITAL ENCOUNTER (EMERGENCY)
Facility: HOSPITAL | Age: 89
Discharge: HOME | End: 2024-08-25
Attending: STUDENT IN AN ORGANIZED HEALTH CARE EDUCATION/TRAINING PROGRAM
Payer: MEDICARE

## 2024-08-25 VITALS
DIASTOLIC BLOOD PRESSURE: 85 MMHG | WEIGHT: 201.4 LBS | HEIGHT: 68 IN | OXYGEN SATURATION: 95 % | RESPIRATION RATE: 17 BRPM | SYSTOLIC BLOOD PRESSURE: 138 MMHG | HEART RATE: 89 BPM | BODY MASS INDEX: 30.52 KG/M2 | TEMPERATURE: 98 F

## 2024-08-25 DIAGNOSIS — S09.90XA CLOSED HEAD INJURY, INITIAL ENCOUNTER: ICD-10-CM
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
## 2024-08-25 DIAGNOSIS — S02.2XXA CLOSED FRACTURE OF NASAL BONE, INITIAL ENCOUNTER: Primary | ICD-10-CM

## 2024-08-25 PROCEDURE — 6370000000 HC RX 637 (ALT 250 FOR IP): Performed by: STUDENT IN AN ORGANIZED HEALTH CARE EDUCATION/TRAINING PROGRAM

## 2024-08-25 PROCEDURE — 70450 CT HEAD/BRAIN W/O DYE: CPT

## 2024-08-25 PROCEDURE — 70486 CT MAXILLOFACIAL W/O DYE: CPT

## 2024-08-25 PROCEDURE — 99284 EMERGENCY DEPT VISIT MOD MDM: CPT

## 2024-08-25 RX ORDER — ATENOLOL 25 MG/1
25 TABLET ORAL DAILY
COMMUNITY

## 2024-08-25 RX ORDER — AMLODIPINE BESYLATE 5 MG/1
5 TABLET ORAL DAILY
COMMUNITY
Start: 2024-08-02

## 2024-08-25 RX ORDER — BLOOD SUGAR DIAGNOSTIC
STRIP MISCELLANEOUS
COMMUNITY
Start: 2024-05-31

## 2024-08-25 RX ORDER — OXYMETAZOLINE HYDROCHLORIDE 0.05 G/100ML
2 SPRAY NASAL ONCE
Status: COMPLETED | OUTPATIENT
Start: 2024-08-25 | End: 2024-08-25

## 2024-08-25 RX ADMIN — OXYMETAZOLINE HCL 2 SPRAY: 0.05 SPRAY NASAL at 23:24

## 2024-08-25 ASSESSMENT — PAIN - FUNCTIONAL ASSESSMENT
PAIN_FUNCTIONAL_ASSESSMENT: NONE - DENIES PAIN
PAIN_FUNCTIONAL_ASSESSMENT: NONE - DENIES PAIN

## 2024-08-25 ASSESSMENT — LIFESTYLE VARIABLES
HOW OFTEN DO YOU HAVE A DRINK CONTAINING ALCOHOL: NEVER
HOW MANY STANDARD DRINKS CONTAINING ALCOHOL DO YOU HAVE ON A TYPICAL DAY: PATIENT DOES NOT DRINK

## 2024-08-26 NOTE — ED PROVIDER NOTES
appointment as soon as possible for a visit in 2 weeks      3.  Return to ED if worse    4.      Medication List        STOP taking these medications      carvedilol 6.25 MG tablet  Commonly known as: COREG            ASK your doctor about these medications      Accu-Chek Guide strip  Generic drug: blood glucose test strips     acetaminophen 325 MG tablet  Commonly known as: TYLENOL     albuterol sulfate  (90 Base) MCG/ACT inhaler  Commonly known as: PROVENTIL;VENTOLIN;PROAIR  Inhale 2 puffs into the lungs every 4 hours as needed for Wheezing or Shortness of Breath     allopurinol 100 MG tablet  Commonly known as: ZYLOPRIM     amLODIPine 5 MG tablet  Commonly known as: NORVASC     aspirin 81 MG EC tablet     atenolol 25 MG tablet  Commonly known as: TENORMIN     atorvastatin 40 MG tablet  Commonly known as: LIPITOR     benzocaine-menthol 15-3.6 MG lozenge  Commonly known as: CEPACOL SORE THROAT  Take 1 lozenge by mouth in the morning, at noon, and at bedtime     cetirizine 5 MG tablet  Commonly known as: ZYRTEC  Take 1 tablet by mouth daily     clopidogrel 75 MG tablet  Commonly known as: PLAVIX     docusate 100 MG Caps  Commonly known as: COLACE, DULCOLAX  Take 100 mg by mouth 2 times daily     fluticasone 50 MCG/ACT nasal spray  Commonly known as: FLONASE     furosemide 20 MG tablet  Commonly known as: Lasix  Take 1 tablet by mouth daily     lisinopril 20 MG tablet  Commonly known as: PRINIVIL;ZESTRIL     magnesium oxide 400 (240 Mg) MG tablet  Commonly known as: MAG-OX     meclizine 12.5 MG tablet  Commonly known as: ANTIVERT     metFORMIN 500 MG tablet  Commonly known as: GLUCOPHAGE     mometasone-formoterol 100-5 MCG/ACT inhaler  Commonly known as: Dulera  Inhale 2 puffs into the lungs 2 times daily     tamsulosin 0.4 MG capsule  Commonly known as: FLOMAX            5.   Current Discharge Medication List          Procedures, Critical Care, & Clinical Tools   Performed by: Kelby Sexton MD  Procedures

## 2024-08-26 NOTE — ED TRIAGE NOTES
Pt brought in via EMS d/t a fall on blood thinners. Pt was trying to get up out of a chair when his right leg gave out on him and he fell onto his face. Pt is on blood thinners d/t his hx of afib. Pt arrives with bilateral nare bleeding (nose clamped placed). Pt denies any pain ATT.

## 2024-08-26 NOTE — DISCHARGE INSTRUCTIONS
Thank you!    Thank you for allowing me to care for you in the emergency department.  I sincerely hope that you are satisfied with your visit today.  It is my goal to provide you with excellent care.    Below you will find a list of your labs and imaging from your visit today if applicable. Should you have any questions regarding these results please do not hesitate to call the emergency department. Please review [employer] for a more detailed result list since the below list may not be comprehensive. Instructions on how to sign up to [employer] should be provided in this packet.    Labs -  No results found for this or any previous visit (from the past 12 hour(s)).    Radiologic Studies -   CT MAXILLOFACIAL WO CONTRAST   Final Result      1.  No acute traumatic intracranial injury.   2. Acute nasal bone fractures.         Electronically signed by Leonides Novoa      CT HEAD WO CONTRAST   Final Result      1.  No acute traumatic intracranial injury.   2. Acute nasal bone fractures.         Electronically signed by Leonides Novoa             If you feel that you have not received excellent quality care or timely care, please ask to speak to the nurse manager. Please choose us in the future for your continued health care needs.   ------------------------------------------------------------------------------------------------------------  The exam and treatment you received in the Emergency Department were for an urgent problem and are not intended as complete care. It is important that you follow-up with a doctor, nurse practitioner, or physician assistant to:  (1) confirm your diagnosis,  (2) re-evaluation of changes in your illness and treatment, and  (3) for ongoing care.  If your symptoms become worse or you do not improve as expected and you are unable to reach your usual health care provider, you should return to the Emergency Department. We are available 24 hours a day.     Please take your discharge instructions with

## 2024-08-28 ENCOUNTER — HOSPITAL ENCOUNTER (OUTPATIENT)
Facility: HOSPITAL | Age: 89
Discharge: HOME | End: 2024-08-31
Attending: INTERNAL MEDICINE
Payer: MEDICARE

## 2024-08-28 DIAGNOSIS — R91.1 LUNG NODULE: ICD-10-CM

## 2024-08-28 LAB — CREAT SERPL-MCNC: 1.41 MG/DL (ref 0.7–1.3)

## 2024-08-28 PROCEDURE — 6360000004 HC RX CONTRAST MEDICATION: Performed by: INTERNAL MEDICINE

## 2024-08-28 PROCEDURE — 71260 CT THORAX DX C+: CPT

## 2024-08-28 PROCEDURE — 36415 COLL VENOUS BLD VENIPUNCTURE: CPT

## 2024-08-28 PROCEDURE — 82565 ASSAY OF CREATININE: CPT

## 2024-08-28 RX ORDER — IOPAMIDOL 755 MG/ML
100 INJECTION, SOLUTION INTRAVASCULAR
Status: COMPLETED | OUTPATIENT
Start: 2024-08-28 | End: 2024-08-28

## 2024-08-28 RX ADMIN — IOPAMIDOL 100 ML: 755 INJECTION, SOLUTION INTRAVENOUS at 13:45

## 2024-08-30 ENCOUNTER — OFFICE VISIT (OUTPATIENT)
Age: 89
End: 2024-08-30
Payer: MEDICARE

## 2024-08-30 VITALS
HEART RATE: 88 BPM | BODY MASS INDEX: 31.32 KG/M2 | OXYGEN SATURATION: 98 % | HEIGHT: 65 IN | SYSTOLIC BLOOD PRESSURE: 130 MMHG | WEIGHT: 188 LBS | DIASTOLIC BLOOD PRESSURE: 86 MMHG

## 2024-08-30 DIAGNOSIS — R04.0 EPISTAXIS: ICD-10-CM

## 2024-08-30 DIAGNOSIS — S02.2XXA CLOSED DISPLACED FRACTURE OF NASAL BONE, INITIAL ENCOUNTER: Primary | ICD-10-CM

## 2024-08-30 PROCEDURE — 1123F ACP DISCUSS/DSCN MKR DOCD: CPT | Performed by: OTOLARYNGOLOGY

## 2024-08-30 PROCEDURE — G8427 DOCREV CUR MEDS BY ELIG CLIN: HCPCS | Performed by: OTOLARYNGOLOGY

## 2024-08-30 PROCEDURE — 99203 OFFICE O/P NEW LOW 30 MIN: CPT | Performed by: OTOLARYNGOLOGY

## 2024-08-30 PROCEDURE — 1036F TOBACCO NON-USER: CPT | Performed by: OTOLARYNGOLOGY

## 2024-08-30 PROCEDURE — G8417 CALC BMI ABV UP PARAM F/U: HCPCS | Performed by: OTOLARYNGOLOGY

## 2024-08-30 RX ORDER — CARVEDILOL 3.12 MG/1
3.12 TABLET ORAL 2 TIMES DAILY
COMMUNITY

## 2024-08-30 ASSESSMENT — ENCOUNTER SYMPTOMS
SINUS PRESSURE: 0
COUGH: 0
EYE DISCHARGE: 0
SORE THROAT: 0
STRIDOR: 0
EYE ITCHING: 0
VOICE CHANGE: 0
RHINORRHEA: 0
SHORTNESS OF BREATH: 0
VOMITING: 0
TROUBLE SWALLOWING: 0
CHOKING: 0
BACK PAIN: 0
NAUSEA: 0
SINUS PAIN: 0
ABDOMINAL PAIN: 0
APNEA: 0

## 2024-08-30 NOTE — PROGRESS NOTES
Right Turbinates: Not enlarged.      Left Turbinates: Not enlarged.      Right Sinus: No maxillary sinus tenderness.      Left Sinus: No maxillary sinus tenderness.      Mouth/Throat:      Lips: Pink.      Mouth: Mucous membranes are moist. No oral lesions.      Tongue: No lesions.      Palate: No lesions.      Pharynx: Oropharynx is clear.      Tonsils: No tonsillar exudate.   Eyes:      Extraocular Movements:      Right eye: Normal extraocular motion and no nystagmus.      Left eye: Normal extraocular motion and no nystagmus.   Neck:      Thyroid: No thyroid mass.      Trachea: Trachea and phonation normal.   Cardiovascular:      Rate and Rhythm: Normal rate and regular rhythm.   Pulmonary:      Effort: Pulmonary effort is normal.      Breath sounds: No stridor.   Musculoskeletal:      Cervical back: Normal range of motion. No muscular tenderness.   Lymphadenopathy:      Cervical: No cervical adenopathy.   Neurological:      General: No focal deficit present.      Mental Status: He is alert and oriented to person, place, and time.      Cranial Nerves: Cranial nerves 2-12 are intact.      Gait: Gait abnormal.      Comments: Rolling walker   Psychiatric:         Attention and Perception: Attention normal.         Mood and Affect: Mood normal.         Speech: Speech normal.         Behavior: Behavior normal.        CT MAXILLOFACIAL WO CONTRAST  Order: [id number]  Status: Final result       Visible to patient: No (not released)       Next appt: None    0 Result Notes  Details    Reading Physician Reading Date Result Priority   Leonides Novoa MD  [phone] 8/25/2024      Narrative & Impression  INDICATION:   fall, head trauma, epistaxis, on anticoagulation      EXAMINATION:  CT HEAD WO CONTRAST, CT MAXILLOFACIAL WO CONTRAST     COMPARISON:  None     TECHNIQUE:  Routine noncontrast axial head CT and maxillofacial CT examinations  were performed.  Sagittal and coronal reconstructions were generated. CT  record and process the conversation to generate a clinical note. The patient (or guardian, if applicable) and other individuals in attendance at the appointment consented to the use of AI, including the recording.